# Patient Record
Sex: FEMALE | Race: WHITE | Employment: OTHER | URBAN - METROPOLITAN AREA
[De-identification: names, ages, dates, MRNs, and addresses within clinical notes are randomized per-mention and may not be internally consistent; named-entity substitution may affect disease eponyms.]

---

## 2017-03-21 ENCOUNTER — HOSPITAL ENCOUNTER (OUTPATIENT)
Dept: NUCLEAR MEDICINE | Age: 77
Discharge: HOME | End: 2017-03-21
Attending: PHYSICIAN ASSISTANT
Payer: MEDICARE

## 2017-03-21 ENCOUNTER — HOSPITAL ENCOUNTER (OUTPATIENT)
Dept: ULTRASOUND IMAGING | Age: 77
Discharge: HOME | End: 2017-03-21
Attending: PHYSICIAN ASSISTANT
Payer: MEDICARE

## 2017-03-21 VITALS
WEIGHT: 145 LBS | OXYGEN SATURATION: 98 % | HEART RATE: 89 BPM | DIASTOLIC BLOOD PRESSURE: 76 MMHG | RESPIRATION RATE: 18 BRPM | BODY MASS INDEX: 24.5 KG/M2 | SYSTOLIC BLOOD PRESSURE: 144 MMHG

## 2017-03-21 DIAGNOSIS — R11.0 NAUSEA: ICD-10-CM

## 2017-03-21 DIAGNOSIS — R10.13 EPIGASTRIC ABDOMINAL PAIN: ICD-10-CM

## 2017-03-21 DIAGNOSIS — K21.9 GASTROESOPHAGEAL REFLUX DISEASE, ESOPHAGITIS PRESENCE NOT SPECIFIED: ICD-10-CM

## 2017-03-21 PROCEDURE — 78227 HEPATOBIL SYST IMAGE W/DRUG: CPT

## 2017-03-21 PROCEDURE — 74011250636 HC RX REV CODE- 250/636

## 2017-03-21 PROCEDURE — 76705 ECHO EXAM OF ABDOMEN: CPT

## 2017-03-21 RX ORDER — MORPHINE SULFATE 2 MG/ML
2 INJECTION, SOLUTION INTRAMUSCULAR; INTRAVENOUS ONCE
Status: COMPLETED | OUTPATIENT
Start: 2017-03-21 | End: 2017-03-21

## 2017-03-21 RX ORDER — SODIUM CHLORIDE 0.9 % (FLUSH) 0.9 %
10 SYRINGE (ML) INJECTION
Status: COMPLETED | OUTPATIENT
Start: 2017-03-21 | End: 2017-03-21

## 2017-03-21 RX ADMIN — Medication 10 ML: at 08:34

## 2017-03-21 RX ADMIN — MORPHINE SULFATE 2 MG: 2 INJECTION, SOLUTION INTRAMUSCULAR; INTRAVENOUS at 09:41

## 2017-03-21 NOTE — PROGRESS NOTES
IMPRESSION: Nonvisualization of the gallbladder despite morphine augmentation,  suggestive of acute cholecystitis.   The patient was notified of these results, [facility] was called and an appointment was made with Dr. Vandana Roy tomorrow at 3 PM.

## 2017-03-21 NOTE — PROGRESS NOTES
IMPRESSION: Gallbladder wall thickening, 10 mm and echogenic bile without  discrete stones and borderline common bile duct size of 10 mm. This may  represent acute or chronic cholecystopathy. Represents a change from the prior  exam of October 2016. The patient was notified today and appointment scheduled with Dr. Pablo Pierce for eval and management tomorrow at 3 PM. [facility] to call and notify the patient.

## 2017-03-23 ENCOUNTER — ANESTHESIA EVENT (OUTPATIENT)
Dept: SURGERY | Age: 77
End: 2017-03-23
Payer: MEDICARE

## 2017-03-24 ENCOUNTER — ANESTHESIA (OUTPATIENT)
Dept: SURGERY | Age: 77
End: 2017-03-24
Payer: MEDICARE

## 2017-03-24 ENCOUNTER — HOSPITAL ENCOUNTER (OUTPATIENT)
Age: 77
Setting detail: OUTPATIENT SURGERY
Discharge: HOME | End: 2017-03-24
Attending: SURGERY | Admitting: SURGERY
Payer: MEDICARE

## 2017-03-24 ENCOUNTER — SURGERY (OUTPATIENT)
Age: 77
End: 2017-03-24

## 2017-03-24 VITALS
DIASTOLIC BLOOD PRESSURE: 68 MMHG | RESPIRATION RATE: 18 BRPM | TEMPERATURE: 98.4 F | HEART RATE: 85 BPM | SYSTOLIC BLOOD PRESSURE: 126 MMHG | OXYGEN SATURATION: 98 %

## 2017-03-24 LAB
CREAT SERPL-MCNC: 1.19 MG/DL (ref 0.6–1)
GLUCOSE BLD STRIP.AUTO-MCNC: 115 MG/DL (ref 65–100)
HGB BLD-MCNC: 12.9 G/DL (ref 11.7–15.4)
POTASSIUM SERPL-SCNC: 3.5 MMOL/L (ref 3.5–5.1)

## 2017-03-24 PROCEDURE — 77030020782 HC GWN BAIR PAWS FLX 3M -B: Performed by: ANESTHESIOLOGY

## 2017-03-24 PROCEDURE — 77030010513 HC APPL CLP LIG J&J -C: Performed by: SURGERY

## 2017-03-24 PROCEDURE — 77030036733 HC ENDOLP LIG VCRL SUT J&J -C: Performed by: SURGERY

## 2017-03-24 PROCEDURE — 84132 ASSAY OF SERUM POTASSIUM: CPT | Performed by: ANESTHESIOLOGY

## 2017-03-24 PROCEDURE — 77030009851 HC PCH RTVR ENDOSC AMR -B: Performed by: SURGERY

## 2017-03-24 PROCEDURE — 74011000250 HC RX REV CODE- 250: Performed by: SURGERY

## 2017-03-24 PROCEDURE — 77030008756 HC TU IRR SUC STRY -B: Performed by: SURGERY

## 2017-03-24 PROCEDURE — 77030011640 HC PAD GRND REM COVD -A: Performed by: SURGERY

## 2017-03-24 PROCEDURE — 82565 ASSAY OF CREATININE: CPT | Performed by: ANESTHESIOLOGY

## 2017-03-24 PROCEDURE — 77030035220 HC TRCR ENDOSC BLNTPRT ANCHR COVD -B: Performed by: SURGERY

## 2017-03-24 PROCEDURE — 74011250636 HC RX REV CODE- 250/636

## 2017-03-24 PROCEDURE — 74011250637 HC RX REV CODE- 250/637: Performed by: ANESTHESIOLOGY

## 2017-03-24 PROCEDURE — 77030010507 HC ADH SKN DERMBND J&J -B: Performed by: SURGERY

## 2017-03-24 PROCEDURE — 77030008703 HC TU ET UNCUF COVD -A: Performed by: ANESTHESIOLOGY

## 2017-03-24 PROCEDURE — 74011000250 HC RX REV CODE- 250

## 2017-03-24 PROCEDURE — 77030034154 HC SHR COAG HARM ACE J&J -F: Performed by: SURGERY

## 2017-03-24 PROCEDURE — 77030019940 HC BLNKT HYPOTHRM STRY -B: Performed by: ANESTHESIOLOGY

## 2017-03-24 PROCEDURE — 77030032490 HC SLV COMPR SCD KNE COVD -B: Performed by: SURGERY

## 2017-03-24 PROCEDURE — 77030002933 HC SUT MCRYL J&J -A: Performed by: SURGERY

## 2017-03-24 PROCEDURE — 77030008518 HC TBNG INSUF ENDO STRY -B: Performed by: SURGERY

## 2017-03-24 PROCEDURE — 77030012894: Performed by: SURGERY

## 2017-03-24 PROCEDURE — 77030035051: Performed by: SURGERY

## 2017-03-24 PROCEDURE — 77030008467 HC STPLR SKN COVD -B: Performed by: SURGERY

## 2017-03-24 PROCEDURE — 77030031139 HC SUT VCRL2 J&J -A: Performed by: SURGERY

## 2017-03-24 PROCEDURE — 74011250636 HC RX REV CODE- 250/636: Performed by: SURGERY

## 2017-03-24 PROCEDURE — 76010000161 HC OR TIME 1 TO 1.5 HR INTENSV-TIER 1: Performed by: SURGERY

## 2017-03-24 PROCEDURE — 76210000021 HC REC RM PH II 0.5 TO 1 HR: Performed by: SURGERY

## 2017-03-24 PROCEDURE — 74011250636 HC RX REV CODE- 250/636: Performed by: ANESTHESIOLOGY

## 2017-03-24 PROCEDURE — 76210000006 HC OR PH I REC 0.5 TO 1 HR: Performed by: SURGERY

## 2017-03-24 PROCEDURE — 77030035048 HC TRCR ENDOSC OPTCL COVD -B: Performed by: SURGERY

## 2017-03-24 PROCEDURE — 82962 GLUCOSE BLOOD TEST: CPT

## 2017-03-24 PROCEDURE — 85018 HEMOGLOBIN: CPT | Performed by: ANESTHESIOLOGY

## 2017-03-24 PROCEDURE — 77030009403 HC ELECTRD ENDO MEGA -B: Performed by: SURGERY

## 2017-03-24 PROCEDURE — 76060000033 HC ANESTHESIA 1 TO 1.5 HR: Performed by: SURGERY

## 2017-03-24 PROCEDURE — 88304 TISSUE EXAM BY PATHOLOGIST: CPT | Performed by: SURGERY

## 2017-03-24 PROCEDURE — 77030008477 HC STYL SATN SLP COVD -A: Performed by: ANESTHESIOLOGY

## 2017-03-24 RX ORDER — PROPOFOL 10 MG/ML
INJECTION, EMULSION INTRAVENOUS AS NEEDED
Status: DISCONTINUED | OUTPATIENT
Start: 2017-03-24 | End: 2017-03-24 | Stop reason: HOSPADM

## 2017-03-24 RX ORDER — NEOSTIGMINE METHYLSULFATE 1 MG/ML
INJECTION INTRAVENOUS AS NEEDED
Status: DISCONTINUED | OUTPATIENT
Start: 2017-03-24 | End: 2017-03-24 | Stop reason: HOSPADM

## 2017-03-24 RX ORDER — FENTANYL CITRATE 50 UG/ML
INJECTION, SOLUTION INTRAMUSCULAR; INTRAVENOUS AS NEEDED
Status: DISCONTINUED | OUTPATIENT
Start: 2017-03-24 | End: 2017-03-24 | Stop reason: HOSPADM

## 2017-03-24 RX ORDER — BUPIVACAINE HYDROCHLORIDE 5 MG/ML
INJECTION, SOLUTION EPIDURAL; INTRACAUDAL AS NEEDED
Status: DISCONTINUED | OUTPATIENT
Start: 2017-03-24 | End: 2017-03-24 | Stop reason: HOSPADM

## 2017-03-24 RX ORDER — MIDAZOLAM HYDROCHLORIDE 1 MG/ML
2 INJECTION, SOLUTION INTRAMUSCULAR; INTRAVENOUS
Status: COMPLETED | OUTPATIENT
Start: 2017-03-24 | End: 2017-03-24

## 2017-03-24 RX ORDER — OXYCODONE HYDROCHLORIDE 5 MG/1
10 TABLET ORAL
Status: COMPLETED | OUTPATIENT
Start: 2017-03-24 | End: 2017-03-24

## 2017-03-24 RX ORDER — HYDROMORPHONE HYDROCHLORIDE 2 MG/ML
0.5 INJECTION, SOLUTION INTRAMUSCULAR; INTRAVENOUS; SUBCUTANEOUS
Status: DISCONTINUED | OUTPATIENT
Start: 2017-03-24 | End: 2017-03-24 | Stop reason: HOSPADM

## 2017-03-24 RX ORDER — LIDOCAINE HYDROCHLORIDE 10 MG/ML
0.3 INJECTION INFILTRATION; PERINEURAL ONCE
Status: DISCONTINUED | OUTPATIENT
Start: 2017-03-24 | End: 2017-03-24 | Stop reason: HOSPADM

## 2017-03-24 RX ORDER — ROCURONIUM BROMIDE 10 MG/ML
INJECTION, SOLUTION INTRAVENOUS AS NEEDED
Status: DISCONTINUED | OUTPATIENT
Start: 2017-03-24 | End: 2017-03-24 | Stop reason: HOSPADM

## 2017-03-24 RX ORDER — ONDANSETRON 2 MG/ML
INJECTION INTRAMUSCULAR; INTRAVENOUS AS NEEDED
Status: DISCONTINUED | OUTPATIENT
Start: 2017-03-24 | End: 2017-03-24 | Stop reason: HOSPADM

## 2017-03-24 RX ORDER — SODIUM CHLORIDE 0.9 % (FLUSH) 0.9 %
5-10 SYRINGE (ML) INJECTION AS NEEDED
Status: DISCONTINUED | OUTPATIENT
Start: 2017-03-24 | End: 2017-03-24 | Stop reason: HOSPADM

## 2017-03-24 RX ORDER — SODIUM CHLORIDE, SODIUM LACTATE, POTASSIUM CHLORIDE, CALCIUM CHLORIDE 600; 310; 30; 20 MG/100ML; MG/100ML; MG/100ML; MG/100ML
100 INJECTION, SOLUTION INTRAVENOUS CONTINUOUS
Status: DISCONTINUED | OUTPATIENT
Start: 2017-03-24 | End: 2017-03-24 | Stop reason: HOSPADM

## 2017-03-24 RX ORDER — CEFAZOLIN SODIUM IN 0.9 % NACL 2 G/50 ML
2 INTRAVENOUS SOLUTION, PIGGYBACK (ML) INTRAVENOUS ONCE
Status: COMPLETED | OUTPATIENT
Start: 2017-03-24 | End: 2017-03-24

## 2017-03-24 RX ORDER — SODIUM CHLORIDE 0.9 % (FLUSH) 0.9 %
5-10 SYRINGE (ML) INJECTION EVERY 8 HOURS
Status: DISCONTINUED | OUTPATIENT
Start: 2017-03-24 | End: 2017-03-24 | Stop reason: HOSPADM

## 2017-03-24 RX ORDER — GLYCOPYRROLATE 0.2 MG/ML
INJECTION INTRAMUSCULAR; INTRAVENOUS AS NEEDED
Status: DISCONTINUED | OUTPATIENT
Start: 2017-03-24 | End: 2017-03-24 | Stop reason: HOSPADM

## 2017-03-24 RX ORDER — FAMOTIDINE 20 MG/1
20 TABLET, FILM COATED ORAL ONCE
Status: COMPLETED | OUTPATIENT
Start: 2017-03-24 | End: 2017-03-24

## 2017-03-24 RX ORDER — LIDOCAINE HYDROCHLORIDE 20 MG/ML
INJECTION, SOLUTION EPIDURAL; INFILTRATION; INTRACAUDAL; PERINEURAL AS NEEDED
Status: DISCONTINUED | OUTPATIENT
Start: 2017-03-24 | End: 2017-03-24 | Stop reason: HOSPADM

## 2017-03-24 RX ADMIN — LIDOCAINE HYDROCHLORIDE 100 MG: 20 INJECTION, SOLUTION EPIDURAL; INFILTRATION; INTRACAUDAL; PERINEURAL at 07:22

## 2017-03-24 RX ADMIN — MIDAZOLAM HYDROCHLORIDE 2 MG: 1 INJECTION, SOLUTION INTRAMUSCULAR; INTRAVENOUS at 06:58

## 2017-03-24 RX ADMIN — NEOSTIGMINE METHYLSULFATE 3 MG: 1 INJECTION INTRAVENOUS at 08:01

## 2017-03-24 RX ADMIN — FAMOTIDINE 20 MG: 20 TABLET, FILM COATED ORAL at 06:12

## 2017-03-24 RX ADMIN — BUPIVACAINE HYDROCHLORIDE 30 ML: 5 INJECTION, SOLUTION EPIDURAL; INTRACAUDAL; PERINEURAL at 08:11

## 2017-03-24 RX ADMIN — ONDANSETRON 4 MG: 2 INJECTION INTRAMUSCULAR; INTRAVENOUS at 07:39

## 2017-03-24 RX ADMIN — ROCURONIUM BROMIDE 30 MG: 10 INJECTION, SOLUTION INTRAVENOUS at 07:22

## 2017-03-24 RX ADMIN — CEFAZOLIN 2 G: 1 INJECTION, POWDER, FOR SOLUTION INTRAMUSCULAR; INTRAVENOUS; PARENTERAL at 07:12

## 2017-03-24 RX ADMIN — PROPOFOL 30 MG: 10 INJECTION, EMULSION INTRAVENOUS at 07:40

## 2017-03-24 RX ADMIN — OXYCODONE HYDROCHLORIDE 10 MG: 5 TABLET ORAL at 08:37

## 2017-03-24 RX ADMIN — FENTANYL CITRATE 100 MCG: 50 INJECTION, SOLUTION INTRAMUSCULAR; INTRAVENOUS at 07:22

## 2017-03-24 RX ADMIN — PROPOFOL 150 MG: 10 INJECTION, EMULSION INTRAVENOUS at 07:22

## 2017-03-24 RX ADMIN — GLYCOPYRROLATE 0.4 MG: 0.2 INJECTION INTRAMUSCULAR; INTRAVENOUS at 08:01

## 2017-03-24 RX ADMIN — ROCURONIUM BROMIDE 5 MG: 10 INJECTION, SOLUTION INTRAVENOUS at 07:43

## 2017-03-24 RX ADMIN — SODIUM CHLORIDE, SODIUM LACTATE, POTASSIUM CHLORIDE, AND CALCIUM CHLORIDE 100 ML/HR: 600; 310; 30; 20 INJECTION, SOLUTION INTRAVENOUS at 06:12

## 2017-03-24 NOTE — ANESTHESIA PREPROCEDURE EVALUATION
Anesthetic History   No history of anesthetic complications            Review of Systems / Medical History  Patient summary reviewed and pertinent labs reviewed    Pulmonary  Within defined limits                 Neuro/Psych   Within defined limits           Cardiovascular    Hypertension              Exercise tolerance: >4 METS     GI/Hepatic/Renal               Comments: Last gallbladder attack 2 weeks ago Endo/Other    Diabetes: well controlled, type 2         Other Findings              Physical Exam    Airway  Mallampati: I  TM Distance: 4 - 6 cm  Neck ROM: normal range of motion   Mouth opening: Normal     Cardiovascular    Rhythm: regular  Rate: normal         Dental  No notable dental hx       Pulmonary  Breath sounds clear to auscultation               Abdominal         Other Findings            Anesthetic Plan    ASA: 2  Anesthesia type: general          Induction: Intravenous  Anesthetic plan and risks discussed with: Patient and Spouse

## 2017-03-24 NOTE — IP AVS SNAPSHOT
[address] [address] 
[phone] Patient: Radha Hutchinson MRN: [id number] YDZ:[birth year] You are allergic to the following Allergen Reactions Ciprofloxacin Other (comments) Complains of pain in abd Lipitor (Atorvastatin) Palpitations Ultram (Tramadol) Nausea Only Recent Documentation Height Weight BMI OB Status Smoking Status (P) 1.651 m (P) 66.4 kg (P) 24.35 kg/m2 Postmenopausal Former Smoker Emergency Contacts Name Discharge Info Relation Home Work Mobile [address] CAREGIVER [3] Spouse [3] [phone] About your hospitalization You were admitted on:  March 24, 2017 [facility] You were discharged on:  March 24, 2017 Unit phone number:  [phone] Why you were hospitalized Your primary diagnosis was:  Not on File Providers Seen During Your Hospitalizations Provider Role Specialty Primary office phone Brian Card MD Attending Provider General Surgery [phone] Your Primary Care Physician (PCP) Primary Care Physician Office Phone Office Fax Kanchan Morrissey [phone] Follow-up Information Follow up With Details Comments Contact Info Lorie Lim PA-C   [address] [facility] [place] [id number] 
[phone] Your Appointments Monday April 03, 2017  2:55 PM EDT Global Post Op with Brian Card MD  
[place] [facility] ([address] [place] [address]) [address] [place] [id number] [phone] Current Discharge Medication List  
  
CONTINUE these medications which have NOT CHANGED Dose & Instructions Dispensing Information Comments Morning Noon Evening Bedtime benazepril-hydroCHLOROthiazide 20-12.5 mg per tablet Commonly known as:  LOTENSIN HCT Your last dose was: Your next dose is:    
   
   
 Dose:  1 Tab Take 1 Tab by mouth daily. Quantity:  90 Tab Refills:  1  
     
   
   
   
  
 fenofibrate 160 mg tablet Commonly known as:  LOFIBRA Your last dose was: Your next dose is: TAKE 1 TABLET BY MOUTH DAILY Quantity:  90 Tab Refills:  0  
     
   
   
   
  
 FISH OIL PO Your last dose was: Your next dose is:    
   
   
 Dose:  1 Tab Take 1 Tab by mouth daily. Refills:  0  
     
   
   
   
  
 folic acid 476 mcg tablet Your last dose was: Your next dose is:    
   
   
 Dose:  800 mcg Take 800 mcg by mouth daily. Refills:  0  
     
   
   
   
  
 levothyroxine 75 mcg tablet Commonly known as:  SYNTHROID Your last dose was: Your next dose is:    
   
   
 Dose:  75 mcg Take 1 Tab by mouth Daily (before breakfast). Quantity:  90 Tab Refills:  3  
     
   
   
   
  
 metFORMIN 500 mg tablet Commonly known as:  GLUCOPHAGE Your last dose was: Your next dose is:    
   
   
 Dose:  500 mg Take 500 mg by mouth daily. Refills:  0  
     
   
   
   
  
 raloxifene 60 mg tablet Commonly known as:  EVISTA Your last dose was: Your next dose is:    
   
   
 Dose:  60 mg Take 1 Tab by mouth daily. Quantity:  90 Tab Refills:  3  
     
   
   
   
  
 rosuvastatin 10 mg tablet Commonly known as:  CRESTOR Your last dose was: Your next dose is:    
   
   
 Dose:  10 mg Take 1 Tab by mouth nightly. Quantity:  90 Tab Refills:  3 VITAMIN D3 1,000 unit tablet Generic drug:  cholecalciferol Your last dose was: Your next dose is: Take  by mouth daily. Refills:  0 Discharge Instructions 1. Diet  low fat diet after laparoscopic cholecystectomy. 2. Showering is allowed in 24 hours, but no tub baths, hot tubs or swimming. 3. Drainage is common from the wounds. Change the dressings as needed. Call our office if the wounds become reddened, tender, feel warm to the touch or pus starts to drain from the wound. 4. Take prescribed pain medication as directed, usually Percocet, Norco, Ultram or Dilaudid. Take over the counter medication for minor pain. 5. Ice may be applied intermittently to the surgical site or sites. 6. Call or office, [phone], if problems arise. 7. Follow up in the office at the assigned time. 8. Resume all medications as taken per surgery, unless specifically instructed not to take certain ones. 9. No lifting more than 25 pounds until told otherwise. 10. Driving is allowed 3 days after surgery as long as you feel comfortable enough to drive and have not taken any prescription pain medication prior to driving. After general anesthesia or intravenous sedation, for 24 hours or while taking prescription Narcotics: · Limit your activities · Do not drive and operate hazardous machinery · Do not make important personal or business decisions · Do  not drink alcoholic beverages · If you have not urinated within 8 hours after discharge, please contact your surgeon on call. *  Please give a list of your current medications to your Primary Care Provider. *  Please update this list whenever your medications are discontinued, doses are 
    changed, or new medications (including over-the-counter products) are added. *  Please carry medication information at all times in case of emergency situations. These are general instructions for a healthy lifestyle: No smoking/ No tobacco products/ Avoid exposure to second hand smoke Surgeon General's Warning:  Quitting smoking now greatly reduces serious risk to your health. Obesity, smoking, and sedentary lifestyle greatly increases your risk for illness A healthy diet, regular physical exercise & weight monitoring are important for maintaining a healthy lifestyle You may be retaining fluid if you have a history of heart failure or if you experience any of the following symptoms:  Weight gain of 3 pounds or more overnight or 5 pounds in a week, increased swelling in our hands or feet or shortness of breath while lying flat in bed. Please call your doctor as soon as you notice any of these symptoms; do not wait until your next office visit. Recognize signs and symptoms of STROKE: 
 
F-face looks uneven A-arms unable to move or move unevenly S-speech slurred or non-existent T-time-call 911 as soon as signs and symptoms begin-DO NOT go Back to bed or wait to see if you get better-TIME IS BRAIN. Discharge Orders None ACO Transitions of Care Introducing [employer] [address] Simone offers a voluntary care coordination program to provide high quality service and care to [facility] fee-for-service beneficiaries. David Williamson was designed to help you enhance your health and well-being through the following services: ? Transitions of Care  support for individuals who are transitioning from one care setting to another (example: Hospital to home). ? Chronic and Complex Care Coordination  support for individuals and caregivers of those with serious or chronic illnesses or with more than one chronic (ongoing) condition and those who take a number of different medications. If you meet specific medical criteria, a [facility] [address] may call you directly to coordinate your care with your primary care physician and your other care providers. For questions about the [facility] programs, please, contact your physicians office. For general questions or additional information about Accountable Care Organizations: 
Please visit www.medicare.gov/acos. html or call 1-800-MEDICARE (9-731.526.1781) TTY users should call 6-239.745.8858. Introducing [facility] SERVICES! Adam Carrington introduces [employer] patient portal. Now you can access parts of your medical record, email your doctor's office, and request medication refills online. 1. In your internet browser, go to [email or web address] 2. Click on the First Time User? Click Here link in the Sign In box. You will see the New Member Sign Up page. 3. Enter your [employer] Access Code exactly as it appears below. You will not need to use this code after youve completed the sign-up process. If you do not sign up before the expiration date, you must request a new code. · [employer] Access Code: [email or web address] Expires: 6/4/2017  4:37 PM 
 
4. Enter the last four digits of your Social Security Number (xxxx) and Date of Birth (mm/dd/yyyy) as indicated and click Submit. You will be taken to the next sign-up page. 5. Create a [employer] ID. This will be your [employer] login ID and cannot be changed, so think of one that is secure and easy to remember. 6. Create a [employer] password. You can change your password at any time. 7. Enter your Password Reset Question and Answer. This can be used at a later time if you forget your password. 8. Enter your e-mail address. You will receive e-mail notification when new information is available in [address] 9. Click Sign Up. You can now view and download portions of your medical record. 10. Click the Download Summary menu link to download a portable copy of your medical information. If you have questions, please visit the Frequently Asked Questions section of the [employer] website. Remember, [employer] is NOT to be used for urgent needs. For medical emergencies, dial 911. Now available from your iPhone and Android! General Information Please provide this summary of care documentation to your next provider. Patient Signature:  ____________________________________________________________ Date:  ____________________________________________________________  
  
Marvetta Land Provider Signature:  ____________________________________________________________ Date:  ____________________________________________________________

## 2017-03-24 NOTE — INTERVAL H&P NOTE
H&P Update:  Margarita Palomo was seen and examined. History and physical has been reviewed. The patient has been examined.  There have been no significant clinical changes since the completion of the originally dated History and Physical.    Signed By: Negar Monzon MD     March 24, 2017 6:11 AM

## 2017-03-24 NOTE — OP NOTES
Cholecystectomy Op Note Template Note     Indications: The patient was admitted to the hospital with acute cholecystitis. The patient now presents for laparoscopic, possible open, cholecystectomy after discussing therapeutic alternatives. Pre-Op Diagnosis: ACUTE CHOLECYSTITIS    Post-Op Diagnosis: SAME    Procedure: LAPAROSCOPIC CHOLECYSTECTOMY      Surgeon: Jossue Ontiveros MD    Anesthesia:  General plus local         Procedure Details     The patient was brought to the operating room and placed supine. After induction of a general anesthetic, the abdomen was prepped and draped in standard fashion. An incision was made in the umbilicus and a Simón trocar was placed in the usual fashion after which  the abdomen was insufflated to 15mmHg sterile CO2. The other trocars were then placed under direct vision. These were positioned four fingerbreadths below the right costal margin in the anterior axillary line and mid clavicular line and just to the right of the falciform ligament in the epigatrium. The gallbladder was grasped. Omental adhesions were taken down. The area of Calot's triangle was dissected with the cystic duct and cystic artery being exposed. Once these two structures had been identified the gallbladder graspers were repositioned, so that one was on the liver/gallbladder junction and a second on the fundus of the gallbladder. The gallbladder was  from the liver with the use of the Harmonic scalpel. The dissection was taken down to the cystic artery which was divided between four 5 mm clips. The cystic duct was divided between four 5 mm clips. The gallbladder was free of all attachments and was removed from the abdomen via the umbilical incision site. The gallbladder was sent to pathology for evaluation at this point. The Simón was returned to the umbilical incision site, the insufflation was restarted and the camera placed through the Simón trocar.  The gallbladder fossa was without evidence of bleeding, there was no bleeding from the cystic artery stump and there was no evidence of bile leak from the cystic duct stump. The three upper abdominal trocars were removed under the direct vision without bleeding from the trocar sites. The Simón trocar was removed and the fascia of the umbilicus was closed with 0'0  Vicryl. The trocar sites were closed with the subcuticular Monocryl and Steri-strips. A sterile dressing was applied. The patient was taken to the recovery room in good condition, having tolerated the procedure well. Instrument, sponge, and needle counts were correct prior to abdominal closure and at the conclusion of the case.      Findings: Acute cholecystitis    Estimated Blood Loss:    10 cc's          Specimens: Gallbladder       Signed: Uriel Deng MD

## 2017-03-24 NOTE — ANESTHESIA POSTPROCEDURE EVALUATION
Post-Anesthesia Evaluation and Assessment    Patient: Gatito Gasca MRN: [id number]  SSN: [id number]    YOB: 1940  Age: 68 y.o. Sex: female       Cardiovascular Function/Vital Signs  Visit Vitals    /68    Pulse 85    Temp 36.9 °C (98.4 °F)    Resp 18    Ht (P) 5' 5\" (1.651 m)    Wt (P) 66.4 kg (146 lb 5 oz)    SpO2 98%    BMI (P) 24.35 kg/m2       Patient is status post general anesthesia for Procedure(s):  CHOLECYSTECTOMY LAPAROSCOPIC . Nausea/Vomiting: None    Postoperative hydration reviewed and adequate. Pain:  Pain Scale 1: Numeric (0 - 10) (03/24/17 0907)  Pain Intensity 1: 2 (03/24/17 0907)   Managed    Neurological Status:   Neuro (WDL): Within Defined Limits (03/24/17 0847)   At baseline    Mental Status and Level of Consciousness: Awake alert    Pulmonary Status:   O2 Device: Room air (03/24/17 0847)   Adequate oxygenation and airway patent    Complications related to anesthesia: None    Post-anesthesia assessment completed.  No concerns    Signed By: Stafford Holter, MD     March 24, 2017

## 2017-03-24 NOTE — H&P (VIEW-ONLY)
aDate: 3/23/2017      Name: Ellen Turk      MRN: [id number]       : 1940       Age: 68 y.o. Sex: female        Fernando Sequeira PA-C       CC:    Chief Complaint   Patient presents with    New Patient     gallbladder        HPI:     Ellen Turk is a 68 y.o. female who presents for evaluation of gallbladder problems. The patient had an US done which showed:     FINDINGS: The ultrasonographic Ovalles's sign is reported as negative. There is  echogenic layering debris within the gallbladder although no discrete  gallstones. The gallbladder wall is thickened, up to 10 mm. The common bile duct  is prominent, 10 mm. There are probably some internal echoes within the duct  although no discrete stones. Intrahepatic biliary tree is not dilated. Included  portion of the pancreas and right kidney are unremarkable.     IMPRESSION  IMPRESSION: Gallbladder wall thickening, 10 mm and echogenic bile without  discrete stones and borderline common bile duct size of 10 mm. This may  represent acute or chronic cholecystopathy. Represents a change from the prior  exam of 2016. The patient had a HIDA scan done which showed: FINDINGS: There is good concentration of radiotracer by the liver. There is  prompt excretion in the biliary tree. Common bile duct and proximal small bowel  is demonstrated. At 60 minutes no gallbladder was identified. 2 mg morphine was  given intravenously for augmentation Gallbladder is not identified at 60  minutes. There is reflux of activity in the stomach.      IMPRESSION  IMPRESSION: Nonvisualization of the gallbladder despite morphine augmentation,  suggestive of acute cholecystitis. The patient has had RUQ pain, nausea, pain referred to her back, bloating and GERD. No diarrhea.      PMH:    Past Medical History:   Diagnosis Date    Benign essential hypertension     [facility] ([facility]     DM type 2 (diabetes mellitus, type 2) ([facility]     Gout     Hypercholesterolemia     Hypothyroidism, acquired        PSH:    Past Surgical History:   Procedure Laterality Date    HX BREAST LUMPECTOMY Left        MEDS:    Current Outpatient Prescriptions   Medication Sig    benazepril-hydroCHLOROthiazide (LOTENSIN HCT) 20-12.5 mg per tablet Take 1 Tab by mouth daily.  hydrocortisone (ANUSOL-HC) 25 mg supp Insert 1 Suppository into rectum every twelve (12) hours. As needed for hemorrhoids    hydrocortisone (ANUSOL-HC) 2.5 % rectal cream Insert  into rectum four (4) times daily.  metFORMIN ER (GLUCOPHAGE XR) 500 mg tablet TAKE 1 TABLET BY MOUTH DAILY WITH DINNER    fenofibrate (LOFIBRA) 160 mg tablet TAKE 1 TABLET BY MOUTH DAILY    raloxifene (EVISTA) 60 mg tablet Take 1 Tab by mouth daily.  rosuvastatin (CRESTOR) 10 mg tablet Take 1 Tab by mouth nightly.  levothyroxine (SYNTHROID) 75 mcg tablet Take 1 Tab by mouth Daily (before breakfast).  cholecalciferol (VITAMIN D3) 1,000 unit tablet Take  by mouth daily.  DOCOSAHEXANOIC ACID/EPA (FISH OIL PO) Take  by mouth.  folic acid 225 mcg tablet Take 800 mcg by mouth daily. No current facility-administered medications for this visit. ALLERGIES:      Allergies   Allergen Reactions    Ciprofloxacin Other (comments)     Complains of pain in abd    Lipitor [Atorvastatin] Palpitations    Ultram [Tramadol] Nausea Only       SH:    Social History   Substance Use Topics    Smoking status: Former Smoker     Types: Cigarettes     Quit date: 1/1/1983    Smokeless tobacco: Never Used    Alcohol use No       FH:    Family History   Problem Relation Age of Onset    Cancer Mother      Breast CA    Diabetes Father     Heart Attack Father      M.I       ROS: The patient has no difficulty with chest pain or shortness of breath. No fever or chills. Comprehensive 13 point review of systems was otherwise unremarkable except as noted above.     Physical Exam:     Visit Vitals    /66    Pulse 60    Ht 5' 4\" (1.626 m)    Wt 145 lb (65.8 kg)    BMI 24.89 kg/m2       General: Alert, oriented, cooperative white female in no acute distress. Eyes: Sclera are clear. Conjunctiva and lids within normal limits. No icterus. Ears and Nose: no gross deformities to visual inspection, gross hearing intact  Neck: Supple, trachea midline, no appreciable thyromegaly  Resp: Breathing is  non-labored. Lungs clear to auscultation without wheezing or rhonchi   CV: RRR. No murmurs, rubs or gallops appreciated. Abd: soft, RUQ and epigastric tenderness, active BS'S. Psych:  Mood and affect appropriate. Short-term memory and understanding intact      Assessment/Plan:  Lori Baltazar is a 68 y.o. female who has signs and symptoms consistent with acute cholecystitis. 1. Laparoscopic, possible open, cholecystectomy. I went through the risks of bleeding, infection and anesthesia. I went through other risks of injury to the liver, biliary tree structures, stomach, small bowel, large bowel , pancreas and the potential need to convert to an open procedure.     Morena Bruno MD      [facility]   3/23/2017  2:30 PM

## 2017-04-10 ENCOUNTER — HOSPITAL ENCOUNTER (OUTPATIENT)
Dept: GENERAL RADIOLOGY | Age: 77
Discharge: HOME | End: 2017-04-10
Payer: MEDICARE

## 2017-04-10 DIAGNOSIS — K59.03 DRUG-INDUCED CONSTIPATION: ICD-10-CM

## 2017-04-10 PROCEDURE — 74022 RADEX COMPL AQT ABD SERIES: CPT

## 2017-04-15 ENCOUNTER — APPOINTMENT (OUTPATIENT)
Dept: GENERAL RADIOLOGY | Age: 77
DRG: 871 | End: 2017-04-15
Attending: EMERGENCY MEDICINE
Payer: MEDICARE

## 2017-04-15 ENCOUNTER — APPOINTMENT (OUTPATIENT)
Dept: ULTRASOUND IMAGING | Age: 77
DRG: 871 | End: 2017-04-15
Attending: EMERGENCY MEDICINE
Payer: MEDICARE

## 2017-04-15 ENCOUNTER — HOSPITAL ENCOUNTER (INPATIENT)
Age: 77
LOS: 7 days | Discharge: HOME HEALTH | DRG: 871 | End: 2017-04-22
Attending: EMERGENCY MEDICINE | Admitting: INTERNAL MEDICINE
Payer: MEDICARE

## 2017-04-15 ENCOUNTER — APPOINTMENT (OUTPATIENT)
Dept: GENERAL RADIOLOGY | Age: 77
DRG: 871 | End: 2017-04-15
Attending: NURSE PRACTITIONER
Payer: MEDICARE

## 2017-04-15 ENCOUNTER — APPOINTMENT (OUTPATIENT)
Dept: GENERAL RADIOLOGY | Age: 77
DRG: 871 | End: 2017-04-15
Attending: INTERNAL MEDICINE
Payer: MEDICARE

## 2017-04-15 ENCOUNTER — APPOINTMENT (OUTPATIENT)
Dept: CT IMAGING | Age: 77
DRG: 871 | End: 2017-04-15
Attending: NURSE PRACTITIONER
Payer: MEDICARE

## 2017-04-15 DIAGNOSIS — D64.9 ANEMIA, UNSPECIFIED TYPE: ICD-10-CM

## 2017-04-15 DIAGNOSIS — A41.9 SEVERE SEPSIS WITH SEPTIC SHOCK (HCC): ICD-10-CM

## 2017-04-15 DIAGNOSIS — A41.9 SEPSIS, DUE TO UNSPECIFIED ORGANISM: ICD-10-CM

## 2017-04-15 DIAGNOSIS — I95.9 HYPOTENSION, UNSPECIFIED HYPOTENSION TYPE: ICD-10-CM

## 2017-04-15 DIAGNOSIS — R65.21 SEVERE SEPSIS WITH SEPTIC SHOCK (HCC): ICD-10-CM

## 2017-04-15 DIAGNOSIS — R18.8 OTHER ASCITES: ICD-10-CM

## 2017-04-15 DIAGNOSIS — N17.9 AKI (ACUTE KIDNEY INJURY) (HCC): ICD-10-CM

## 2017-04-15 DIAGNOSIS — R65.10 SIRS (SYSTEMIC INFLAMMATORY RESPONSE SYNDROME) (HCC): Primary | ICD-10-CM

## 2017-04-15 DIAGNOSIS — E46 PROTEIN CALORIE MALNUTRITION (HCC): ICD-10-CM

## 2017-04-15 DIAGNOSIS — D72.829 LEUKOCYTOSIS, UNSPECIFIED TYPE: ICD-10-CM

## 2017-04-15 DIAGNOSIS — Z90.49 S/P LAPAROSCOPIC CHOLECYSTECTOMY: ICD-10-CM

## 2017-04-15 DIAGNOSIS — R60.0 EDEMA OF BOTH LEGS: ICD-10-CM

## 2017-04-15 DIAGNOSIS — N17.9 ACUTE KIDNEY INJURY (HCC): ICD-10-CM

## 2017-04-15 DIAGNOSIS — R14.0 ABDOMINAL DISTENSION: ICD-10-CM

## 2017-04-15 PROBLEM — R10.84 GENERALIZED ABDOMINAL PAIN: Status: ACTIVE | Noted: 2017-04-15

## 2017-04-15 LAB
ALBUMIN SERPL BCP-MCNC: 1.8 G/DL (ref 3.2–4.6)
ALBUMIN/GLOB SERPL: 0.3 {RATIO} (ref 1.2–3.5)
ALP SERPL-CCNC: 208 U/L (ref 50–136)
ALT SERPL-CCNC: 48 U/L (ref 12–65)
ANION GAP BLD CALC-SCNC: 12 MMOL/L (ref 7–16)
APPEARANCE UR: ABNORMAL
AST SERPL W P-5'-P-CCNC: 46 U/L (ref 15–37)
BACTERIA URNS QL MICRO: ABNORMAL /HPF
BILIRUB DIRECT SERPL-MCNC: 1 MG/DL
BILIRUB SERPL-MCNC: 1.8 MG/DL (ref 0.2–1.1)
BILIRUB UR QL: ABNORMAL
BUN SERPL-MCNC: 62 MG/DL (ref 8–23)
CALCIUM SERPL-MCNC: 9.8 MG/DL (ref 8.3–10.4)
CASTS URNS QL MICRO: ABNORMAL /LPF
CHLORIDE SERPL-SCNC: 101 MMOL/L (ref 98–107)
CO2 SERPL-SCNC: 22 MMOL/L (ref 21–32)
COLOR UR: YELLOW
CREAT SERPL-MCNC: 2.4 MG/DL (ref 0.6–1)
CRYSTALS URNS QL MICRO: ABNORMAL /LPF
DIFFERENTIAL METHOD BLD: ABNORMAL
EPI CELLS #/AREA URNS HPF: ABNORMAL /HPF
ERYTHROCYTE [DISTWIDTH] IN BLOOD BY AUTOMATED COUNT: 13.1 % (ref 11.9–14.6)
GLOBULIN SER CALC-MCNC: 5.7 G/DL (ref 2.3–3.5)
GLUCOSE SERPL-MCNC: 190 MG/DL (ref 65–100)
GLUCOSE UR STRIP.AUTO-MCNC: NEGATIVE MG/DL
HCT VFR BLD AUTO: 32.2 % (ref 35.8–46.3)
HGB BLD-MCNC: 10.8 G/DL (ref 11.7–15.4)
HGB UR QL STRIP: NEGATIVE
INR PPP: 1.2 (ref 0.9–1.2)
KETONES UR QL STRIP.AUTO: ABNORMAL MG/DL
LACTATE BLD-SCNC: 1.9 MMOL/L (ref 0.5–1.9)
LEUKOCYTE ESTERASE UR QL STRIP.AUTO: ABNORMAL
LIPASE SERPL-CCNC: 284 U/L (ref 73–393)
LYMPHOCYTES # BLD: 1.6 K/UL (ref 0.5–4.6)
LYMPHOCYTES NFR BLD MANUAL: 7 % (ref 16–44)
MCH RBC QN AUTO: 27.4 PG (ref 26.1–32.9)
MCHC RBC AUTO-ENTMCNC: 33.5 G/DL (ref 31.4–35)
MCV RBC AUTO: 81.7 FL (ref 79.6–97.8)
MONOCYTES # BLD: 1.1 K/UL (ref 0.1–1.3)
MONOCYTES NFR BLD MANUAL: 5 % (ref 3–9)
NEUTS BAND NFR BLD MANUAL: 5 % (ref 0–10)
NEUTS SEG # BLD: 19.9 K/UL (ref 1.7–8.2)
NEUTS SEG NFR BLD MANUAL: 83 % (ref 47–75)
NITRITE UR QL STRIP.AUTO: NEGATIVE
PH UR STRIP: 5 [PH] (ref 5–9)
PLATELET # BLD AUTO: 896 K/UL (ref 150–450)
PLATELET COMMENTS,PCOM: ABNORMAL
PMV BLD AUTO: 9.6 FL (ref 10.8–14.1)
POTASSIUM SERPL-SCNC: 4.7 MMOL/L (ref 3.5–5.1)
PROCALCITONIN SERPL-MCNC: 0.4 NG/ML
PROT SERPL-MCNC: 7.5 G/DL (ref 6.3–8.2)
PROT UR STRIP-MCNC: 30 MG/DL
PROTHROMBIN TIME: 12.6 SEC (ref 9.6–12)
RBC # BLD AUTO: 3.94 M/UL (ref 4.05–5.25)
RBC #/AREA URNS HPF: ABNORMAL /HPF
RBC MORPH BLD: ABNORMAL
SODIUM SERPL-SCNC: 135 MMOL/L (ref 136–145)
SP GR UR REFRACTOMETRY: 1.03 (ref 1–1.02)
UROBILINOGEN UR QL STRIP.AUTO: 0.2 EU/DL (ref 0.2–1)
WBC # BLD AUTO: 22.6 K/UL (ref 4.3–11.1)
WBC URNS QL MICRO: ABNORMAL /HPF

## 2017-04-15 PROCEDURE — 77030010547 HC BG URIN W/UMETER -A

## 2017-04-15 PROCEDURE — 87086 URINE CULTURE/COLONY COUNT: CPT | Performed by: NURSE PRACTITIONER

## 2017-04-15 PROCEDURE — 80053 COMPREHEN METABOLIC PANEL: CPT | Performed by: EMERGENCY MEDICINE

## 2017-04-15 PROCEDURE — 96375 TX/PRO/DX INJ NEW DRUG ADDON: CPT | Performed by: EMERGENCY MEDICINE

## 2017-04-15 PROCEDURE — C1751 CATH, INF, PER/CENT/MIDLINE: HCPCS

## 2017-04-15 PROCEDURE — 99285 EMERGENCY DEPT VISIT HI MDM: CPT | Performed by: EMERGENCY MEDICINE

## 2017-04-15 PROCEDURE — 65620000000 HC RM CCU GENERAL

## 2017-04-15 PROCEDURE — 74022 RADEX COMPL AQT ABD SERIES: CPT

## 2017-04-15 PROCEDURE — 74011000258 HC RX REV CODE- 258: Performed by: EMERGENCY MEDICINE

## 2017-04-15 PROCEDURE — 77030019605

## 2017-04-15 PROCEDURE — 74011250636 HC RX REV CODE- 250/636: Performed by: INTERNAL MEDICINE

## 2017-04-15 PROCEDURE — 83690 ASSAY OF LIPASE: CPT | Performed by: NURSE PRACTITIONER

## 2017-04-15 PROCEDURE — 85610 PROTHROMBIN TIME: CPT | Performed by: EMERGENCY MEDICINE

## 2017-04-15 PROCEDURE — 74011250636 HC RX REV CODE- 250/636: Performed by: EMERGENCY MEDICINE

## 2017-04-15 PROCEDURE — 96361 HYDRATE IV INFUSION ADD-ON: CPT | Performed by: EMERGENCY MEDICINE

## 2017-04-15 PROCEDURE — 99284 EMERGENCY DEPT VISIT MOD MDM: CPT | Performed by: EMERGENCY MEDICINE

## 2017-04-15 PROCEDURE — 96376 TX/PRO/DX INJ SAME DRUG ADON: CPT | Performed by: EMERGENCY MEDICINE

## 2017-04-15 PROCEDURE — 77030034850

## 2017-04-15 PROCEDURE — 81001 URINALYSIS AUTO W/SCOPE: CPT | Performed by: EMERGENCY MEDICINE

## 2017-04-15 PROCEDURE — 93005 ELECTROCARDIOGRAM TRACING: CPT | Performed by: EMERGENCY MEDICINE

## 2017-04-15 PROCEDURE — 02HV33Z INSERTION OF INFUSION DEVICE INTO SUPERIOR VENA CAVA, PERCUTANEOUS APPROACH: ICD-10-PCS | Performed by: INTERNAL MEDICINE

## 2017-04-15 PROCEDURE — 99223 1ST HOSP IP/OBS HIGH 75: CPT | Performed by: INTERNAL MEDICINE

## 2017-04-15 PROCEDURE — 71020 XR CHEST PA LAT: CPT

## 2017-04-15 PROCEDURE — 85025 COMPLETE CBC W/AUTO DIFF WBC: CPT | Performed by: EMERGENCY MEDICINE

## 2017-04-15 PROCEDURE — 87186 SC STD MICRODIL/AGAR DIL: CPT | Performed by: NURSE PRACTITIONER

## 2017-04-15 PROCEDURE — 93005 ELECTROCARDIOGRAM TRACING: CPT | Performed by: INTERNAL MEDICINE

## 2017-04-15 PROCEDURE — 87088 URINE BACTERIA CULTURE: CPT | Performed by: NURSE PRACTITIONER

## 2017-04-15 PROCEDURE — 83605 ASSAY OF LACTIC ACID: CPT

## 2017-04-15 PROCEDURE — 84145 PROCALCITONIN (PCT): CPT | Performed by: EMERGENCY MEDICINE

## 2017-04-15 PROCEDURE — 82248 BILIRUBIN DIRECT: CPT | Performed by: NURSE PRACTITIONER

## 2017-04-15 PROCEDURE — 96365 THER/PROPH/DIAG IV INF INIT: CPT | Performed by: EMERGENCY MEDICINE

## 2017-04-15 PROCEDURE — 74176 CT ABD & PELVIS W/O CONTRAST: CPT

## 2017-04-15 PROCEDURE — 36556 INSERT NON-TUNNEL CV CATH: CPT

## 2017-04-15 PROCEDURE — 36556 INSERT NON-TUNNEL CV CATH: CPT | Performed by: INTERNAL MEDICINE

## 2017-04-15 PROCEDURE — 76705 ECHO EXAM OF ABDOMEN: CPT

## 2017-04-15 PROCEDURE — 74011636320 HC RX REV CODE- 636/320: Performed by: SURGERY

## 2017-04-15 PROCEDURE — 71010 XR CHEST PORT: CPT

## 2017-04-15 PROCEDURE — B548ZZA ULTRASONOGRAPHY OF SUPERIOR VENA CAVA, GUIDANCE: ICD-10-PCS | Performed by: INTERNAL MEDICINE

## 2017-04-15 RX ORDER — MORPHINE SULFATE 2 MG/ML
2 INJECTION, SOLUTION INTRAMUSCULAR; INTRAVENOUS
Status: COMPLETED | OUTPATIENT
Start: 2017-04-15 | End: 2017-04-15

## 2017-04-15 RX ORDER — SODIUM CHLORIDE 9 MG/ML
1000 INJECTION, SOLUTION INTRAVENOUS ONCE
Status: COMPLETED | OUTPATIENT
Start: 2017-04-15 | End: 2017-04-15

## 2017-04-15 RX ORDER — MORPHINE SULFATE 4 MG/ML
4 INJECTION, SOLUTION INTRAMUSCULAR; INTRAVENOUS
Status: COMPLETED | OUTPATIENT
Start: 2017-04-15 | End: 2017-04-15

## 2017-04-15 RX ORDER — VANCOMYCIN HYDROCHLORIDE
1250 ONCE
Status: COMPLETED | OUTPATIENT
Start: 2017-04-15 | End: 2017-04-16

## 2017-04-15 RX ORDER — ONDANSETRON 2 MG/ML
4 INJECTION INTRAMUSCULAR; INTRAVENOUS
Status: DISCONTINUED | OUTPATIENT
Start: 2017-04-15 | End: 2017-04-22 | Stop reason: HOSPADM

## 2017-04-15 RX ORDER — SODIUM CHLORIDE 9 MG/ML
1000 INJECTION, SOLUTION INTRAVENOUS ONCE
Status: DISCONTINUED | OUTPATIENT
Start: 2017-04-15 | End: 2017-04-16

## 2017-04-15 RX ORDER — FAMOTIDINE 10 MG/ML
20 INJECTION INTRAVENOUS EVERY 12 HOURS
Status: DISCONTINUED | OUTPATIENT
Start: 2017-04-15 | End: 2017-04-15

## 2017-04-15 RX ORDER — ONDANSETRON 2 MG/ML
4 INJECTION INTRAMUSCULAR; INTRAVENOUS
Status: COMPLETED | OUTPATIENT
Start: 2017-04-15 | End: 2017-04-15

## 2017-04-15 RX ORDER — SODIUM CHLORIDE 9 MG/ML
500 INJECTION, SOLUTION INTRAVENOUS ONCE
Status: COMPLETED | OUTPATIENT
Start: 2017-04-15 | End: 2017-04-15

## 2017-04-15 RX ORDER — FAMOTIDINE 20 MG/1
20 TABLET, FILM COATED ORAL 2 TIMES DAILY
Status: DISCONTINUED | OUTPATIENT
Start: 2017-04-16 | End: 2017-04-15 | Stop reason: ALTCHOICE

## 2017-04-15 RX ORDER — ALBUMIN HUMAN 250 G/1000ML
12.5 SOLUTION INTRAVENOUS EVERY 8 HOURS
Status: COMPLETED | OUTPATIENT
Start: 2017-04-16 | End: 2017-04-18

## 2017-04-15 RX ORDER — SODIUM CHLORIDE, SODIUM LACTATE, POTASSIUM CHLORIDE, CALCIUM CHLORIDE 600; 310; 30; 20 MG/100ML; MG/100ML; MG/100ML; MG/100ML
75 INJECTION, SOLUTION INTRAVENOUS CONTINUOUS
Status: DISCONTINUED | OUTPATIENT
Start: 2017-04-15 | End: 2017-04-19

## 2017-04-15 RX ORDER — SODIUM CHLORIDE 0.9 % (FLUSH) 0.9 %
5-10 SYRINGE (ML) INJECTION AS NEEDED
Status: DISCONTINUED | OUTPATIENT
Start: 2017-04-15 | End: 2017-04-22 | Stop reason: HOSPADM

## 2017-04-15 RX ORDER — OXYCODONE AND ACETAMINOPHEN 5; 325 MG/1; MG/1
1 TABLET ORAL
Status: DISCONTINUED | OUTPATIENT
Start: 2017-04-15 | End: 2017-04-21 | Stop reason: SDUPTHER

## 2017-04-15 RX ORDER — LEVOTHYROXINE SODIUM 50 UG/1
75 TABLET ORAL
Status: DISCONTINUED | OUTPATIENT
Start: 2017-04-16 | End: 2017-04-15

## 2017-04-15 RX ORDER — FAMOTIDINE 10 MG/ML
20 INJECTION INTRAVENOUS DAILY
Status: DISCONTINUED | OUTPATIENT
Start: 2017-04-17 | End: 2017-04-19

## 2017-04-15 RX ADMIN — SODIUM CHLORIDE, SODIUM LACTATE, POTASSIUM CHLORIDE, AND CALCIUM CHLORIDE 125 ML/HR: 600; 310; 30; 20 INJECTION, SOLUTION INTRAVENOUS at 23:10

## 2017-04-15 RX ADMIN — SODIUM CHLORIDE 1000 ML: 900 INJECTION, SOLUTION INTRAVENOUS at 15:35

## 2017-04-15 RX ADMIN — Medication 2 MG: at 20:17

## 2017-04-15 RX ADMIN — ONDANSETRON 4 MG: 2 INJECTION INTRAMUSCULAR; INTRAVENOUS at 16:43

## 2017-04-15 RX ADMIN — DIATRIZOATE MEGLUMINE AND DIATRIZOATE SODIUM 15 ML: 600; 100 SOLUTION ORAL; RECTAL at 18:35

## 2017-04-15 RX ADMIN — SODIUM CHLORIDE 500 ML: 900 INJECTION, SOLUTION INTRAVENOUS at 20:17

## 2017-04-15 RX ADMIN — MORPHINE SULFATE 4 MG: 4 INJECTION, SOLUTION INTRAMUSCULAR; INTRAVENOUS at 16:43

## 2017-04-15 RX ADMIN — PIPERACILLIN SODIUM,TAZOBACTAM SODIUM 4.5 G: 4; .5 INJECTION, POWDER, FOR SOLUTION INTRAVENOUS at 20:33

## 2017-04-15 NOTE — ED TRIAGE NOTES
Pt. States she had gallbladder surgery 3 weeks ago and states she has been weak and not had an appetite since. Pt. States she had an episode of shortness of breath \"the other day. \"

## 2017-04-15 NOTE — IP AVS SNAPSHOT
[address] 
[phone] Patient: Keesha Sanchez MRN: [id number] YUK:[birth year] You are allergic to the following Allergen Reactions Ciprofloxacin Other (comments) Complains of pain in abd Lipitor (Atorvastatin) Palpitations Ultram (Tramadol) Nausea Only Recent Documentation Height Weight Breastfeeding? BMI OB Status Smoking Status 1.651 m 75 kg No 27.52 kg/m2 Postmenopausal Former Smoker Emergency Contacts Name Discharge Info Relation Home Work Mobile [address] CAREGIVER [3] Spouse [3] [phone] About your hospitalization You were admitted on:  April 15, 2017 You last received care in the:  [facility] 2 SURGICAL You were discharged on:  April 22, 2017 Unit phone number:  [phone] Why you were hospitalized Your primary diagnosis was:  Severe Sepsis With Septic Shock (Hcc) Your diagnoses also included:  Ascites, Abdominal Distension, Hypotension, S/P Laparoscopic Cholecystectomy, Erickson (Acute Kidney Injury) (Hcc), Leukocytosis, Protein Calorie Malnutrition (Hcc), Anemia, Edema Of Both Legs Providers Seen During Your Hospitalizations Provider Role Specialty Primary office phone Karissa Vega DO Attending Provider Emergency Medicine [phone] Dariusz Song DO Attending Provider Internal Medicine [phone] Chema Miranda MD Attending Provider General Surgery [phone] Your Primary Care Physician (PCP) Primary Care Physician Office Phone Office Fax Belkis Bernal [phone] Follow-up Information Follow up With Details Comments Contact Info Sukhwinder Guerrero On 5/7/2017 at [address] [facility] [address] [phone] Chema Miranda MD Go to  [place] [address] [address] [place] [address] 
[phone] Jenny Fernandez PA-C   [address] [facility] [address] 
[phone] Call Dr. Mann Guadarrama office on monday at [phone] to schedule follow up appointment for one week Your Appointments Monday May 01, 2017 12:00 PM EDT  
IR GUIDE CATH/PERC DRAIN  PERITON/RETROPERI FLUID W SI with [facility] UNIT 1, [facility] RADIOLOGIST RESOURCE, [facility] ANES NOT REQUIRED  
[facility] ([address] [place] [address]) [address] [facility]  
[phone] Interventional Radiology Procedure Referring Physicians: 1) Fax H&P/recent office notes and lab work, no older than 30 days (CBC, BMP, PT/PTT) to Interventional Radiology to facilitate prompt scheduling. 2)Patients with contrast dye allergies must be pre medicated prior to arrival. 3) Obtain clearance to hold blood thinners from prescribing Physician and give Patient instructions prior to arrival. 4)Hold oral diabetic medications the day of the procedure. If Insulin is required, take 1/2 dose the day of the procedure. 5) Pt should not eat or drink anything past midnight 6) Pt to arrive 1 hour to 1.5 hours early depending upon sedation method. 7) Responsible adult  required to drive Patient home after recovery period. Recovery period can vary depending on sedation and patient condition. 8) Requires approval from Radiologist prior to scheduling 9) Interventional Radiology Scheduling can be contacted at [phone] Current Discharge Medication List  
  
START taking these medications Dose & Instructions Dispensing Information Comments Morning Noon Evening Bedtime  
 amoxicillin-clavulanate 875-125 mg per tablet Commonly known as:  AUGMENTIN Your last dose was: Your next dose is:    
   
   
 Dose:  1 Tab Take 1 Tab by mouth two (2) times a day for 7 days. Quantity:  14 Tab Refills:  0 oxyCODONE-acetaminophen 5-325 mg per tablet Commonly known as:  PERCOCET Your last dose was: Your next dose is:    
   
   
 1-2 tabs by mouth every four hours prn pain Quantity:  30 Tab Refills:  0  
     
   
   
   
  
 zolpidem 5 mg tablet Commonly known as:  AMBIEN Your last dose was: Your next dose is:    
   
   
 Dose:  5 mg Take 1 Tab by mouth nightly as needed for Sleep. Max Daily Amount: 5 mg. Quantity:  30 Tab Refills:  1 CONTINUE these medications which have NOT CHANGED Dose & Instructions Dispensing Information Comments Morning Noon Evening Bedtime  
 benazepril-hydroCHLOROthiazide 20-12.5 mg per tablet Commonly known as:  LOTENSIN HCT Your last dose was: Your next dose is:    
   
   
 Dose:  1 Tab Take 1 Tab by mouth daily. Quantity:  90 Tab Refills:  1  
     
   
   
   
  
 fenofibrate 160 mg tablet Commonly known as:  LOFIBRA Your last dose was: Your next dose is: TAKE 1 TABLET BY MOUTH DAILY Quantity:  90 Tab Refills:  0  
     
   
   
   
  
 FISH OIL PO Your last dose was: Your next dose is:    
   
   
 Dose:  1 Tab Take 1 Tab by mouth daily. Refills:  0  
     
   
   
   
  
 folic acid 112 mcg tablet Your last dose was: Your next dose is:    
   
   
 Dose:  800 mcg Take 800 mcg by mouth daily. Refills:  0  
     
   
   
   
  
 levothyroxine 75 mcg tablet Commonly known as:  SYNTHROID Your last dose was: Your next dose is:    
   
   
 Dose:  75 mcg Take 1 Tab by mouth Daily (before breakfast). Quantity:  90 Tab Refills:  3  
     
   
   
   
  
 metFORMIN 500 mg tablet Commonly known as:  GLUCOPHAGE Your last dose was: Your next dose is:    
   
   
 Dose:  500 mg Take 500 mg by mouth daily. Refills:  0  
     
   
   
   
  
 raloxifene 60 mg tablet Commonly known as:  EVISTA Your last dose was: Your next dose is:    
   
   
 Dose:  60 mg Take 1 Tab by mouth daily. Quantity:  90 Tab Refills:  3  
     
   
   
   
  
 raNITIdine 150 mg tablet Commonly known as:  ZANTAC Your last dose was: Your next dose is:    
   
   
 Dose:  150 mg Take 1 Tab by mouth two (2) times a day. Quantity:  60 Tab Refills:  0  
     
   
   
   
  
 rosuvastatin 10 mg tablet Commonly known as:  CRESTOR Your last dose was: Your next dose is:    
   
   
 Dose:  10 mg Take 1 Tab by mouth nightly. Quantity:  90 Tab Refills:  3 VITAMIN D3 1,000 unit tablet Generic drug:  cholecalciferol Your last dose was: Your next dose is: Take  by mouth daily. Refills:  0 Where to Get Your Medications Information on where to get these meds will be given to you by the nurse or doctor. ! Ask your nurse or doctor about these medications  
  amoxicillin-clavulanate 875-125 mg per tablet  
 oxyCODONE-acetaminophen 5-325 mg per tablet  
 zolpidem 5 mg tablet Discharge Instructions Infection After Surgery: Care Instructions Your Care Instructions After surgery, an infection is always possible. It doesn't mean that the surgery didn't go well. Because an infection can be serious, your doctor has taken steps to manage it. Your doctor checked the infection and cleaned it if necessary. He or she may have made an opening in the area so that the pus can drain out. You may have gauze in the cut so that the area will stay open and keep draining. You may need antibiotics. You will need to follow up with your doctor to make sure the infection has gone away. Follow-up care is a key part of your treatment and safety.  Be sure to make and go to all appointments, and call your doctor if you are having problems. It's also a good idea to know your test results and keep a list of the medicines you take. How can you care for yourself at home? · Make sure your surgeon knows that you saw a doctor about the infection. · If your doctor prescribed antibiotics, take them as directed. Do not stop taking them just because you feel better. You need to take the full course of antibiotics. · Ask your doctor if you can take an over-the-counter pain medicine, such as acetaminophen (Tylenol), ibuprofen (Advil, Motrin), or naproxen (Aleve). Be safe with medicines. Read and follow all instructions on the label. · Do not take two or more pain medicines at the same time unless the doctor told you to. Many pain medicines have acetaminophen, which is Tylenol. Too much acetaminophen (Tylenol) can be harmful. · Prop up the area on a pillow anytime you sit or lie down during the next 3 days. Try to keep it above the level of your heart. This will help reduce swelling. · Keep the skin clean and dry. · If you have a bandage, keep it clean and dry. · You may have a dressing over the cut (incision). A dressing helps the incision heal and protects it. Your doctor will tell you how to take care of this. You can expect drainage from the wound. · If your doctor told you how to care for your incision, follow your doctor's instructions. If you did not get instructions, follow this general advice: ¨ Wash around the incision with clean water 2 times a day. Don't use hydrogen peroxide or alcohol, which can slow healing. When should you call for help? Call your doctor now or seek immediate medical care if: 
· You have signs that your infection is getting worse, such as: 
¨ Increased pain, swelling, warmth, or redness in the area. ¨ Red streaks leading from the area. ¨ Pus draining from the wound. ¨ A new or higher fever. Watch closely for changes in your health, and be sure to contact your doctor if you have any problems. Where can you learn more? Go to http://renzo-merced.info/. Enter C340 in the search box to learn more about \"Infection After Surgery: Care Instructions. \" Current as of: May 27, 2016 Content Version: 11.2 © 6547-0388 [place] . Care instructions adapted under license by .com (which disclaims liability or warranty for this information). If you have questions about a medical condition or this instruction, always ask your healthcare professional. [address] any warranty or liability for your use of this information. Sepsis: Care Instructions Your Care Instructions Sepsis is an infection that has spread throughout your body. It is a life-threatening condition and often causes extremely low blood pressure. This can lead to problems with many different organs. The cause of sepsis is not always clear, but it can happen as part of a long-term or sudden illness. Sometimes even a mild illness can lead to sepsis. Follow-up care is a key part of your treatment and safety. Be sure to make and go to all appointments, and call your doctor if you are having problems. Its also a good idea to know your test results and keep a list of the medicines you take. How can you care for yourself at home? · If your doctor prescribed antibiotics, take them as directed. Do not stop taking them just because you feel better. You need to take the full course of antibiotics. · Drink plenty of fluids, enough so that your urine is light yellow or clear like water. Choose water or caffeine-free clear liquids until you feel better. If you have kidney, heart, or liver disease and have to limit fluids, talk with your doctor before you increase your fluid intake. You can try rehydration drinks, such as Gatorade or Powerade. · Do not drink alcohol. · Eat a healthy diet. Include fruits, vegetables, and whole grains in your diet every day. · Walking is an easy way to get exercise. Gradually increase the amount you walk every day. Make sure your doctor knows that you are starting an exercise program. 
· Do not smoke or use other tobacco products. If you need help quitting, talk to your doctor about stop-smoking programs and medicines. These can increase your chances of quitting for good. When should you call for help? Call 911 anytime you think you may need emergency care. For example, call if: 
· You passed out (lost consciousness). Call your doctor now or seek immediate medical care if: 
· You have a fever or chills. · You have cool, pale, or clammy skin. · You are dizzy or lightheaded, or you feel like you may faint. · You have any new symptoms, such as a cough, pain in one part of your body, or urinary problems. Watch closely for changes in your health, and be sure to contact your doctor if: 
· You do not get better as expected. Where can you learn more? Go to http://renzo-merced.info/. Enter V450 in the search box to learn more about \"Sepsis: Care Instructions. \" Current as of: May 27, 2016 Content Version: 11.2 © 3716-1228 [employer], [employer]. Care instructions adapted under license by [employer] (which disclaims liability or warranty for this information). If you have questions about a medical condition or this instruction, always ask your healthcare professional. [place] [address] any warranty or liability for your use of this information. Discharge Orders None ACO Transitions of Care Introducing [employer] [address] Missy Nichols offers a voluntary care coordination program to provide high quality service and care to [facility] fee-for-service beneficiaries. Miguelito Knee was designed to help you enhance your health and well-being through the following services: ? Transitions of Care  support for individuals who are transitioning from one care setting to another (example: Hospital to home). ? Chronic and Complex Care Coordination  support for individuals and caregivers of those with serious or chronic illnesses or with more than one chronic (ongoing) condition and those who take a number of different medications. If you meet specific medical criteria, a [facility] [address] may call you directly to coordinate your care with your primary care physician and your other care providers. For questions about the [facility] programs, please, contact your physicians office. For general questions or additional information about Accountable Care Organizations: 
Please visit www.medicare.gov/acos. html or call 1-800-MEDICARE (6-348.999.7369) TTY users should call 8-809.297.4154. [facility] Announcement We are excited to announce that we are making your provider's discharge notes available to you in [facility]. You will see these notes when they are completed and signed by the physician that discharged you from your recent hospital stay. If you have any questions or concerns about any information you see in [facility], please call the Health Information Department where you were seen or reach out to your Primary Care Provider for more information about your plan of care. Introducing hospitals & HEALTH SERVICES! Chandra Beck introduces [facility] patient portal. Now you can access parts of your medical record, email your doctor's office, and request medication refills online. 1. In your internet browser, go to [email or web address] 2. Click on the First Time User? Click Here link in the Sign In box. You will see the New Member Sign Up page. 3. Enter your [facility] Access Code exactly as it appears below. You will not need to use this code after youve completed the sign-up process.  If you do not sign up before the expiration date, you must request a new code. · Wink[employer] Access Code: [email or web address] Expires: 6/4/2017  4:37 PM 
 
4. Enter the last four digits of your Social Security Number (xxxx) and Date of Birth (mm/dd/yyyy) as indicated and click Submit. You will be taken to the next sign-up page. 5. Create a Winkcam ID. This will be your Winkcam login ID and cannot be changed, so think of one that is secure and easy to remember. 6. Create a Winkcam password. You can change your password at any time. 7. Enter your Password Reset Question and Answer. This can be used at a later time if you forget your password. 8. Enter your e-mail address. You will receive e-mail notification when new information is available in [address] 9. Click Sign Up. You can now view and download portions of your medical record. 10. Click the Download Summary menu link to download a portable copy of your medical information. If you have questions, please visit the Frequently Asked Questions section of the Winkcam website. Remember, Winkcam is NOT to be used for urgent needs. For medical emergencies, dial 911. Now available from your iPhone and Android! General Information Please provide this summary of care documentation to your next provider. Patient Signature:  ____________________________________________________________ Date:  ____________________________________________________________  
  
Cecelia Harrison Provider Signature:  ____________________________________________________________ Date:  ____________________________________________________________

## 2017-04-15 NOTE — ED PROVIDER NOTES
Patient is a 68 y.o. female presenting with fatigue. The history is provided by the patient. Fatigue   This is a new problem. The current episode started more than 1 week ago. The problem has been gradually worsening. There was no focality noted. Pertinent negatives include no loss of balance, no speech difficulty and no mental status change. There has been no fever. The fever has been present for less than 1 day. Associated symptoms include shortness of breath and nausea. Pertinent negatives include no chest pain, no vomiting, no altered mental status, no confusion and no headaches. Associated medical issues comments: cholecystectomy 3wks ago. Past Medical History:   Diagnosis Date    Benign essential hypertension     managed well with meds    Breast cancer ([facility]) 20 years ago    Left breast- treated with Chemo and radiation    DM type 2 (diabetes mellitus, type 2) (HCC)     oral agent only/AVG BS: 106/no s.s of of hypoglycemia/Last A1c 6.5    Former cigarette smoker     Gout     hx of - no recent episodes    Hypercholesterolemia     controlled well with meds    Hypothyroidism, acquired     managed well with Synthroid       Past Surgical History:   Procedure Laterality Date    HX BREAST LUMPECTOMY Left     HX CHOLECYSTECTOMY  2017    HX COLONOSCOPY      HX WISDOM TEETH EXTRACTION           Family History:   Problem Relation Age of Onset    Cancer Mother      Breast CA    Heart Disease Mother     Diabetes Father     Heart Attack Father      M.I       Social History     Social History    Marital status:      Spouse name: N/A    Number of children: N/A    Years of education: N/A     Occupational History    Not on file.      Social History Main Topics    Smoking status: Former Smoker     Packs/day: 1.00     Years: 20.00     Types: Cigarettes     Quit date: 1/1/1983    Smokeless tobacco: Never Used    Alcohol use 0.0 oz/week     0 Standard drinks or equivalent per week      Comment: socially    Drug use: No    Sexual activity: Not on file     Other Topics Concern    Not on file     Social History Narrative    Lives at home with , [employer]: Ciprofloxacin; Lipitor [atorvastatin]; and Ultram [tramadol]    Review of Systems   Constitutional: Positive for fatigue. Negative for chills and fever. HENT: Negative. Negative for congestion, ear pain, postnasal drip and rhinorrhea. Eyes: Negative for pain and visual disturbance. Respiratory: Positive for shortness of breath. Negative for cough and wheezing. Cardiovascular: Negative for chest pain and leg swelling. Gastrointestinal: Positive for nausea. Negative for abdominal distention, abdominal pain and vomiting. Endocrine: Negative. Negative for polydipsia, polyphagia and polyuria. Genitourinary: Negative. Negative for difficulty urinating, flank pain and frequency. Musculoskeletal: Negative. Negative for arthralgias and myalgias. Skin: Negative. Neurological: Negative. Negative for dizziness, speech difficulty, headaches and loss of balance. Hematological: Negative. Psychiatric/Behavioral: Negative for confusion. Vitals:    04/15/17 1319   BP: (!) 141/111   Pulse: (!) 114   Resp: 16   Temp: 97.5 °F (36.4 °C)   SpO2: 98%   Weight: 64.9 kg (143 lb)   Height: 5' 5\" (1.651 m)            Physical Exam   Constitutional: She is oriented to person, place, and time. She appears well-developed and well-nourished. Non-toxic appearance. She does not have a sickly appearance. She does not appear ill. HENT:   Head: Normocephalic and atraumatic. Cardiovascular: Intact distal pulses. Pulmonary/Chest: Effort normal.   Abdominal: Soft. There is generalized tenderness. There is no rigidity, no guarding, no tenderness at McBurney's point and negative Ovalles's sign. Neurological: She is alert and oriented to person, place, and time. Skin: Skin is warm and dry.    Psychiatric: She has a normal mood and affect. Her behavior is normal.   Nursing note and vitals reviewed. MDM  Number of Diagnoses or Management Options  Acute kidney injury [facility]): new and requires workup  SIRS (systemic inflammatory response syndrome) ([facility] [address]): new and requires workup  Diagnosis management comments: Tachycardic, leukocytosis, oliguria with OMERO. Patient is post operative with mild, diffuse pains and intermittent abdominal distention. Plain films pending. Surgery paged and will review case. Has been to office this past week and continues to decline. Appears will need inpatient care until clinical improvement can be demonstrated in this patient of advanced age and s/s of SIRS. Surgery added additional studies. Reviewed findings with Dr. Mary Pak and suggested GI consultation and hospitalist admission. Will follow. Understands that vitals are still abnormal, but suggested may be due to liver disease that is not diagnosed yet. No infection sources identified, but personally felt that surgical complications needed to be considered thus contacted surgery first to consult. Dr. Roshni Horne also aware and will follow. Requested US tonight to complete studies he would like to review, but will also likely need diagnostic paracentesis and HIDA scan. Empiric antibiotics ordered since appears definitive diagnosis unclear. Dr. Dedra Quinn aware and will evaluate in ER.        Amount and/or Complexity of Data Reviewed  Clinical lab tests: ordered and reviewed (Results for orders placed or performed during the hospital encounter of 04/15/17  -CBC WITH AUTOMATED DIFF       Result                                            Value                         Ref Range                       WBC                                               22.6 (H)                      4.3 - 11.1 K/uL                 RBC                                               3.94 (L)                      4.05 - 5.25 M/uL                HGB 10.8 (L)                      11.7 - 15.4 g/dL                HCT                                               32.2 (L)                      35.8 - 46.3 %                   MCV                                               81.7                          79.6 - 97.8 FL                  MCH                                               27.4                          26.1 - 32.9 PG                  MCHC                                              33.5                          31.4 - 35.0 g/dL                RDW                                               13.1                          11.9 - 14.6 %                   PLATELET                                          896 (H)                       150 - 450 K/uL                  MPV                                               9.6 (L)                       10.8 - 14.1 FL                  NEUTROPHILS                                       83 (H)                        47 - 75 %                       BAND NEUTROPHILS                                  5                             0 - 10 %                        LYMPHOCYTES                                       7 (L)                         16 - 44 %                       MONOCYTES                                         5                             3 - 9 %                         ABS. NEUTROPHILS                                  19.9 (H)                      1.7 - 8.2 K/UL                  ABS.  LYMPHOCYTES                                  1.6                           0.5 - 4.6 K/UL                  ABS. MONOCYTES                                    1.1                           0.1 - 1.3 K/UL                  RBC COMMENTS                                                                                                SLIGHT   ANISOCYTOSIS + POIKILOCYTOSIS          PLATELET COMMENTS                                 MODERATE                                                      DF MANUAL                                                   -METABOLIC PANEL, COMPREHENSIVE       Result                                            Value                         Ref Range                       Sodium                                            135 (L)                       136 - 145 mmol/L                Potassium                                         4.7                           3.5 - 5.1 mmol/L                Chloride                                          101                           98 - 107 mmol/L                 CO2                                               22                            21 - 32 mmol/L                  Anion gap                                         12                            7 - 16 mmol/L                   Glucose                                           190 (H)                       65 - 100 mg/dL                  BUN                                               62 (H)                        8 - 23 MG/DL                    Creatinine                                        2.40 (H)                      0.6 - 1.0 MG/DL                 GFR est AA                                        25 (L)                        >60 ml/min/1.73m2               GFR est non-AA                                    21 (L)                        >60 ml/min/1.73m2               Calcium                                           9.8                           8.3 - 10.4 MG/DL                Bilirubin, total                                  1.8 (H)                       0.2 - 1.1 MG/DL                 ALT (SGPT)                                        48                            12 - 65 U/L                     AST (SGOT)                                        46 (H)                        15 - 37 U/L                     Alk. phosphatase                                  208 (H)                       50 - 136 U/L                    Protein, total 7.5                           6.3 - 8.2 g/dL                  Albumin                                           1.8 (L)                       3.2 - 4.6 g/dL                  Globulin                                          5.7 (H)                       2.3 - 3.5 g/dL                  A-G Ratio                                         0.3 (L)                       1.2 - 3.5                  -PROTHROMBIN TIME + INR       Result                                            Value                         Ref Range                       Prothrombin time                                  12.6 (H)                      9.6 - 12.0 sec                  INR                                               1.2                           0.9 - 1.2                  -POC LACTIC ACID       Result                                            Value                         Ref Range                       Lactic Acid (POC)                                 1.9                           0.5 - 1.9 mmol/L           )  Tests in the radiology section of CPT®: ordered and reviewed (Xr Chest Pa Lat    Result Date: 4/15/2017  Chest 2 view CLINICAL INDICATION: Acute dyspnea and weakness, recent abdomen surgery COMPARISON: No prior chest radiography available, correlation with abdominal series earlier today TECHNIQUE: Upright AP and lateral views of the chest FINDINGS: Lung volumes are slightly shallow leading to crowding. There is mild right hemidiaphragm elevation unchanged. Surgical clips again project over left axilla and mediastinum. Cardiomediastinal silhouette and hilar contours within normal limits. The lungs are clear. No acute osseous abnormalities are seen. IMPRESSION: No acute disease. Xr Abd Acute W 1 V Chest    Result Date: 4/15/2017  Chest and abdomen 3 views Exam date: 4/15/2017 CLINICAL INFORMATION: Postop distention. Loss of appetite. Shortness of breath. Surgery 3 weeks ago.  Single view the chest was obtained at a relatively poor degree of inspiration. Lungs are clear and vascularity normal. Heart is normal in size and mediastinum unremarkable. There are metallic clips at the left axilla. No pleural effusion. No free air under the diaphragm. Views of the abdomen show a nonspecific gas pattern and no abnormal calcification in the abdomen. A few phleboliths are present in the pelvis. There are metallic clips right upper quadrant from cholecystectomy. IMPRESSION: No acute abnormality    Ct Abd Pelv Wo Cont    Result Date: 4/15/2017   CT Abdomen and Pelvis:  4/15/2017  Clinical Information: Gallbladder surgery 3 weeks ago. Week and lack of appetite. Elevated creatinine Comparison CT: None Standard 5mm axial images of the abdomen and pelvis were obtained. This study was performed using oral but no intravenous contrast. Radiation dose reduction techniques were used for this study. Our scanners use one or all of the following:  Automated exposure control, adjustment of the mA and/or kV according to patient size, iterative reconstruction. CT Abdomen: 2 upper images show mild atelectatic change at the right lung base. There is a hiatal hernia. Spleen is unremarkable. Liver has a lobular contour and is somewhat small. No focal mass. Gallbladder has been removed. Pancreas is unremarkable. Adrenals are normal. Kidneys are normal in size. There is a 1.8 cm cyst mid right kidney. 1 mm nonobstructing stone midpole calyx left kidney. No hydronephrosis. The abdominal aorta is normal in size. Atherosclerotic calcification is present. No adenopathy. There is a large amount of ascites in the abdomen. CT Pelvis: Large amount of ascites is present. There is a small calcified fibroid in the uterus. No pelvic adenopathy. No evidence of bowel obstruction.       Impression: Large amount of ascites in the abdomen and pelvis      )      ED Course       Procedures

## 2017-04-15 NOTE — CONSULTS
[facility]  [address] [place] [address], [address] [place] [address]  [phone]    Office Note/H&P/Consult Note   Jazlyn Le   MRN: [id number]     : 1940        General Surgery Consult ordered by: Dr. Monalisa Roberts  Reason for Consult: Abd distention - pt 3 wks post cholecystectomy    HPI: Jazlyn Le is a 68 y.o.  female who presented to the ED 4/15/17 with c/o fatigue, decreased appetite, and abdominal distention. She is s/p lap patrice on 3/24/17 with Dr. Nayeli Vo. Path showed:   DIAGNOSIS  GALLBLADDER:  CHRONIC CHOLECYSTITIS. Electronically signed out on 3/27/2017 08:26 by Shaunna Jordan MD   Microscopic Description   Sections of gallbladder show scattered Rokitansky-Aschoff sinuses and chronic inflammatory cells. No dysplasia is seen. Dr. Hardy Seip concurs. She saw Dr. Nayeli Vo post-op last week. Pt states the symptoms started shortly after surgery and have worsened. She reports many years of daily alcohol use up until about 1 month ago. She denies fever, chills, CP, or emesis. BMs have been normal. Last BM today. 4/15/17 CT abd/pelvis with oral but no IV contrast    CT Abdomen:  2 upper images show mild atelectatic change at the right lung base. There is a hiatal hernia. Spleen is unremarkable. Liver has a lobular contour and is somewhat small. No focal mass. Gallbladder has been removed. Pancreas is unremarkable. Adrenals are normal. Kidneys are normal in size. There is a 1.8 cm cyst mid right kidney. 1  mm nonobstructing stone midpole calyx left kidney. No hydronephrosis. The abdominal aorta is normal in size. Atherosclerotic calcification is present. No adenopathy. There is a large amount of ascites in the abdomen.     CT Pelvis:  Large amount of ascites is present. There is a small calcified fibroid in the uterus. No pelvic adenopathy.   No evidence of bowel obstruction.         Impression:   Large amount of ascites in the abdomen and pelvis     4/15/17 CXR  FINDINGS: Lung volumes are slightly shallow leading to crowding. There is mild  right hemidiaphragm elevation unchanged. Surgical clips again project over left  axilla and mediastinum. Cardiomediastinal silhouette and hilar contours within  normal limits. The lungs are clear.      No acute osseous abnormalities are seen.     IMPRESSION: No acute disease.            Past Medical History:   Diagnosis Date    Benign essential hypertension     managed well with meds    Breast cancer ([address] 20 years ago    Left breast- treated with Chemo and radiation    DM type 2 (diabetes mellitus, type 2) (HCC)     oral agent only/AVG BS: 106/no s.s of of hypoglycemia/Last A1c 6.5    Former cigarette smoker     Gout     hx of - no recent episodes    Hypercholesterolemia     controlled well with meds    Hypothyroidism, acquired     managed well with Synthroid     Past Surgical History:   Procedure Laterality Date    HX BREAST LUMPECTOMY Left     HX CHOLECYSTECTOMY  2017    HX COLONOSCOPY      HX WISDOM TEETH EXTRACTION       No current facility-administered medications for this encounter. Current Outpatient Prescriptions   Medication Sig    oxyCODONE-acetaminophen (PERCOCET) 5-325 mg per tablet 1 Tab every eight (8) hours as needed.  raNITIdine (ZANTAC) 150 mg tablet Take 1 Tab by mouth two (2) times a day.  metFORMIN (GLUCOPHAGE) 500 mg tablet Take 500 mg by mouth daily.  benazepril-hydroCHLOROthiazide (LOTENSIN HCT) 20-12.5 mg per tablet Take 1 Tab by mouth daily.  fenofibrate (LOFIBRA) 160 mg tablet TAKE 1 TABLET BY MOUTH DAILY    raloxifene (EVISTA) 60 mg tablet Take 1 Tab by mouth daily.  rosuvastatin (CRESTOR) 10 mg tablet Take 1 Tab by mouth nightly.  levothyroxine (SYNTHROID) 75 mcg tablet Take 1 Tab by mouth Daily (before breakfast).  cholecalciferol (VITAMIN D3) 1,000 unit tablet Take  by mouth daily.     DOCOSAHEXANOIC ACID/EPA (FISH OIL PO) Take 1 Tab by mouth daily.  folic acid 959 mcg tablet Take 800 mcg by mouth daily. ALLERGIES:  Ciprofloxacin; Lipitor [atorvastatin]; and Ultram [tramadol]    Social History     Social History    Marital status:      Spouse name: N/A    Number of children: N/A    Years of education: N/A     Social History Main Topics    Smoking status: Former Smoker     Packs/day: 1.00     Years: 20.00     Types: Cigarettes     Quit date: 1/1/1983    Smokeless tobacco: Never Used    Alcohol use 0.0 oz/week     0 Standard drinks or equivalent per week      Comment: socially    Drug use: No    Sexual activity: Not on file     Other Topics Concern    Not on file     Social History Narrative    Lives at home with , Jose Antonio Honor     History   Smoking Status    Former Smoker    Packs/day: 1.00    Years: 20.00    Types: Cigarettes    Quit date: 1/1/1983   Smokeless Tobacco    Never Used     Family History   Problem Relation Age of Onset    Cancer Mother      Breast CA    Heart Disease Mother     Diabetes Father     Heart Attack Father      M.I       ROS: The patient has no difficulty with chest pain or shortness of breath. No fever or chills. Comprehensive review of systems was otherwise unremarkable except as noted above. Physical Exam:   Constitutional: Alert, cooperative, no acute distress. Visit Vitals    BP (!) 85/53    Pulse (!) 101    Temp 97.5 °F (36.4 °C)    Resp 22    Ht 5' 5\" (1.651 m)    Wt 143 lb (64.9 kg)    SpO2 96%    BMI 23.8 kg/m2     Eyes:Sclera are clear. ENMT: no obvious neck masses, no ear or lip lesions  CV: RRR. Normal perfusion  Resp: No JVD. Breathing is  non-labored. GI: tightly distended; minimal generalized ttp; no guarding or rebound BS active     Musculoskeletal: unremarkable with normal function.    Neuro:  AOx3, No obvious focal deficits  Psychiatric: normal affect and mood, no memory impairment      Labs:  Lab Results   Component Value Date/Time    WBC 22.6 04/15/2017 01:20 PM    HGB 10.8 04/15/2017 01:20 PM    PLATELET 883 92/48/8802 01:20 PM    Sodium 135 04/15/2017 01:20 PM    Potassium 4.7 04/15/2017 01:20 PM    Chloride 101 04/15/2017 01:20 PM    CO2 22 04/15/2017 01:20 PM    BUN 62 04/15/2017 01:20 PM    Creatinine 2.40 04/15/2017 01:20 PM    Glucose 190 04/15/2017 01:20 PM    INR 1.2 04/15/2017 01:20 PM    Bilirubin, total 1.8 04/15/2017 01:20 PM    AST (SGOT) 46 04/15/2017 01:20 PM    ALT (SGPT) 48 04/15/2017 01:20 PM    Alk. phosphatase 208 04/15/2017 01:20 PM    Amylase 86 03/14/2017 11:51 AM    Lipase 43 03/14/2017 11:51 AM         Assessment/Plan: Reed Chavarria is a 68 y.o. female who has signs and symptoms consistent with the below issues:  Problem List  Date Reviewed: 3/14/2017          Codes Class Noted    DM type 2 (diabetes mellitus, type 2) ([facility] ICD-10-CM: E11.9  ICD-9-CM: 250.00  Unknown        Hypercholesterolemia ICD-10-CM: E78.00  ICD-9-CM: 272.0  Unknown        Benign essential hypertension ICD-10-CM: I10  ICD-9-CM: 401.1  Unknown        Hypothyroidism, acquired ICD-10-CM: E03.9  ICD-9-CM: 244.9  Unknown            Plan:  Admit for new onset peritoneal fluid on CT and above symptoms  Hydrate aggressively for acute renal failure  U/s tonight to eval for ductal dilation    HIDA in am to rule out bile leak  If HIDA shows a bile leak--->IR drain + ERCP stent  If HIDA shows no leak--->IR drainage vs aspiration and eval for possible portal HTN/cirrhosis  She does report daily alcohol consumption for many years up until about 30 days ago    Follow T Bili and LFTs  Will follow      ANISHA Banks-BC    The patient was seen in conjunction with Dr. Supa Landeros who independently evaluated the patient, reviewed the chart and agreed with the assessment and plan. I have seen and examined the patient with the Nurse Practitioner and agree with the above assessment and plan.      Xiomara Nunes MD

## 2017-04-15 NOTE — IP AVS SNAPSHOT
Current Discharge Medication List  
  
START taking these medications Dose & Instructions Dispensing Information Comments Morning Noon Evening Bedtime  
 amoxicillin-clavulanate 875-125 mg per tablet Commonly known as:  AUGMENTIN Your last dose was: Your next dose is:    
   
   
 Dose:  1 Tab Take 1 Tab by mouth two (2) times a day for 7 days. Quantity:  14 Tab Refills:  0  
     
   
   
   
  
 oxyCODONE-acetaminophen 5-325 mg per tablet Commonly known as:  PERCOCET Your last dose was: Your next dose is:    
   
   
 1-2 tabs by mouth every four hours prn pain Quantity:  30 Tab Refills:  0  
     
   
   
   
  
 zolpidem 5 mg tablet Commonly known as:  AMBIEN Your last dose was: Your next dose is:    
   
   
 Dose:  5 mg Take 1 Tab by mouth nightly as needed for Sleep. Max Daily Amount: 5 mg. Quantity:  30 Tab Refills:  1 CONTINUE these medications which have NOT CHANGED Dose & Instructions Dispensing Information Comments Morning Noon Evening Bedtime  
 benazepril-hydroCHLOROthiazide 20-12.5 mg per tablet Commonly known as:  LOTENSIN HCT Your last dose was: Your next dose is:    
   
   
 Dose:  1 Tab Take 1 Tab by mouth daily. Quantity:  90 Tab Refills:  1  
     
   
   
   
  
 fenofibrate 160 mg tablet Commonly known as:  LOFIBRA Your last dose was: Your next dose is: TAKE 1 TABLET BY MOUTH DAILY Quantity:  90 Tab Refills:  0  
     
   
   
   
  
 FISH OIL PO Your last dose was: Your next dose is:    
   
   
 Dose:  1 Tab Take 1 Tab by mouth daily. Refills:  0  
     
   
   
   
  
 folic acid 601 mcg tablet Your last dose was: Your next dose is:    
   
   
 Dose:  800 mcg Take 800 mcg by mouth daily. Refills:  0  
     
   
   
   
  
 levothyroxine 75 mcg tablet Commonly known as:  SYNTHROID Your last dose was: Your next dose is:    
   
   
 Dose:  75 mcg Take 1 Tab by mouth Daily (before breakfast). Quantity:  90 Tab Refills:  3  
     
   
   
   
  
 metFORMIN 500 mg tablet Commonly known as:  GLUCOPHAGE Your last dose was: Your next dose is:    
   
   
 Dose:  500 mg Take 500 mg by mouth daily. Refills:  0  
     
   
   
   
  
 raloxifene 60 mg tablet Commonly known as:  EVISTA Your last dose was: Your next dose is:    
   
   
 Dose:  60 mg Take 1 Tab by mouth daily. Quantity:  90 Tab Refills:  3  
     
   
   
   
  
 raNITIdine 150 mg tablet Commonly known as:  ZANTAC Your last dose was: Your next dose is:    
   
   
 Dose:  150 mg Take 1 Tab by mouth two (2) times a day. Quantity:  60 Tab Refills:  0  
     
   
   
   
  
 rosuvastatin 10 mg tablet Commonly known as:  CRESTOR Your last dose was: Your next dose is:    
   
   
 Dose:  10 mg Take 1 Tab by mouth nightly. Quantity:  90 Tab Refills:  3 VITAMIN D3 1,000 unit tablet Generic drug:  cholecalciferol Your last dose was: Your next dose is: Take  by mouth daily. Refills:  0 Where to Get Your Medications Information on where to get these meds will be given to you by the nurse or doctor. ! Ask your nurse or doctor about these medications  
  amoxicillin-clavulanate 875-125 mg per tablet  
 oxyCODONE-acetaminophen 5-325 mg per tablet  
 zolpidem 5 mg tablet

## 2017-04-16 ENCOUNTER — APPOINTMENT (OUTPATIENT)
Dept: GENERAL RADIOLOGY | Age: 77
DRG: 871 | End: 2017-04-16
Attending: INTERNAL MEDICINE
Payer: MEDICARE

## 2017-04-16 ENCOUNTER — APPOINTMENT (OUTPATIENT)
Dept: NUCLEAR MEDICINE | Age: 77
DRG: 871 | End: 2017-04-16
Attending: INTERNAL MEDICINE
Payer: MEDICARE

## 2017-04-16 ENCOUNTER — ANESTHESIA EVENT (OUTPATIENT)
Dept: ENDOSCOPY | Age: 77
DRG: 871 | End: 2017-04-16
Payer: MEDICARE

## 2017-04-16 ENCOUNTER — ANESTHESIA (OUTPATIENT)
Dept: ENDOSCOPY | Age: 77
DRG: 871 | End: 2017-04-16
Payer: MEDICARE

## 2017-04-16 ENCOUNTER — APPOINTMENT (OUTPATIENT)
Dept: INTERVENTIONAL RADIOLOGY/VASCULAR | Age: 77
DRG: 871 | End: 2017-04-16
Attending: RADIOLOGY
Payer: MEDICARE

## 2017-04-16 PROBLEM — R65.21 SEVERE SEPSIS WITH SEPTIC SHOCK (HCC): Status: ACTIVE | Noted: 2017-04-15

## 2017-04-16 PROBLEM — D64.9 ANEMIA: Status: ACTIVE | Noted: 2017-04-16

## 2017-04-16 LAB
ALBUMIN SERPL BCP-MCNC: 1.4 G/DL (ref 3.2–4.6)
ALBUMIN SERPL BCP-MCNC: 1.5 G/DL (ref 3.2–4.6)
ALBUMIN/GLOB SERPL: 0.3 {RATIO} (ref 1.2–3.5)
ALBUMIN/GLOB SERPL: 0.4 {RATIO} (ref 1.2–3.5)
ALP SERPL-CCNC: 146 U/L (ref 50–136)
ALP SERPL-CCNC: 161 U/L (ref 50–136)
ALT SERPL-CCNC: 29 U/L (ref 12–65)
ALT SERPL-CCNC: 38 U/L (ref 12–65)
ANION GAP BLD CALC-SCNC: 10 MMOL/L (ref 7–16)
ANION GAP BLD CALC-SCNC: 11 MMOL/L (ref 7–16)
APPEARANCE FLD: NORMAL
ARTERIAL PATENCY WRIST A: POSITIVE
AST SERPL W P-5'-P-CCNC: 19 U/L (ref 15–37)
AST SERPL W P-5'-P-CCNC: 30 U/L (ref 15–37)
ATRIAL RATE: 103 BPM
ATRIAL RATE: 104 BPM
BACTERIA SPEC CULT: NORMAL
BASE DEFICIT BLDA-SCNC: 8.1 MMOL/L (ref 0–2)
BASOPHILS # BLD AUTO: 0 K/UL (ref 0–0.2)
BASOPHILS # BLD AUTO: 0 K/UL (ref 0–0.2)
BASOPHILS # BLD: 0 % (ref 0–2)
BASOPHILS # BLD: 0 % (ref 0–2)
BDY SITE: ABNORMAL
BILIRUB SERPL-MCNC: 1.6 MG/DL (ref 0.2–1.1)
BILIRUB SERPL-MCNC: 1.7 MG/DL (ref 0.2–1.1)
BUN SERPL-MCNC: 56 MG/DL (ref 8–23)
BUN SERPL-MCNC: 58 MG/DL (ref 8–23)
CALCIUM SERPL-MCNC: 8 MG/DL (ref 8.3–10.4)
CALCIUM SERPL-MCNC: 8.2 MG/DL (ref 8.3–10.4)
CALCULATED P AXIS, ECG09: 15 DEGREES
CALCULATED P AXIS, ECG09: 40 DEGREES
CALCULATED R AXIS, ECG10: 20 DEGREES
CALCULATED R AXIS, ECG10: 49 DEGREES
CALCULATED T AXIS, ECG11: -12 DEGREES
CALCULATED T AXIS, ECG11: 1 DEGREES
CHLORIDE SERPL-SCNC: 107 MMOL/L (ref 98–107)
CHLORIDE SERPL-SCNC: 112 MMOL/L (ref 98–107)
CO2 SERPL-SCNC: 20 MMOL/L (ref 21–32)
CO2 SERPL-SCNC: 21 MMOL/L (ref 21–32)
COHGB MFR BLD: 0.8 % (ref 0.5–1.5)
COLOR FLD: NORMAL
CREAT SERPL-MCNC: 1.87 MG/DL (ref 0.6–1)
CREAT SERPL-MCNC: 2.45 MG/DL (ref 0.6–1)
DIAGNOSIS, 93000: NORMAL
DIAGNOSIS, 93000: NORMAL
DIFFERENTIAL METHOD BLD: ABNORMAL
DIFFERENTIAL METHOD BLD: ABNORMAL
DO-HGB BLD-MCNC: 3 % (ref 0–5)
EOSINOPHIL # BLD: 0.2 K/UL (ref 0–0.8)
EOSINOPHIL # BLD: 0.2 K/UL (ref 0–0.8)
EOSINOPHIL NFR BLD: 1 % (ref 0.5–7.8)
EOSINOPHIL NFR BLD: 1 % (ref 0.5–7.8)
ERYTHROCYTE [DISTWIDTH] IN BLOOD BY AUTOMATED COUNT: 13.5 % (ref 11.9–14.6)
ERYTHROCYTE [DISTWIDTH] IN BLOOD BY AUTOMATED COUNT: 13.5 % (ref 11.9–14.6)
FIO2 ON VENT: 60 %
FLUID COMMENTS, FCOM: NORMAL
GLOBULIN SER CALC-MCNC: 4 G/DL (ref 2.3–3.5)
GLOBULIN SER CALC-MCNC: 4.4 G/DL (ref 2.3–3.5)
GLUCOSE SERPL-MCNC: 100 MG/DL (ref 65–100)
GLUCOSE SERPL-MCNC: 92 MG/DL (ref 65–100)
HCO3 BLDA-SCNC: 16 MMOL/L (ref 22–26)
HCT VFR BLD AUTO: 25 % (ref 35.8–46.3)
HCT VFR BLD AUTO: 27.8 % (ref 35.8–46.3)
HGB BLD-MCNC: 8.2 G/DL (ref 11.7–15.4)
HGB BLD-MCNC: 9.2 G/DL (ref 11.7–15.4)
HGB BLDMV-MCNC: 9.1 GM/DL (ref 11.7–15)
IMM GRANULOCYTES # BLD: 0.1 K/UL (ref 0–0.5)
IMM GRANULOCYTES # BLD: 0.1 K/UL (ref 0–0.5)
IMM GRANULOCYTES NFR BLD AUTO: 0.5 % (ref 0–5)
IMM GRANULOCYTES NFR BLD AUTO: 0.5 % (ref 0–5)
LYMPHOCYTES # BLD AUTO: 5 % (ref 13–44)
LYMPHOCYTES # BLD AUTO: 5 % (ref 13–44)
LYMPHOCYTES # BLD: 1.1 K/UL (ref 0.5–4.6)
LYMPHOCYTES # BLD: 1.2 K/UL (ref 0.5–4.6)
LYMPHOCYTES NFR FLD: 5 %
MCH RBC QN AUTO: 27 PG (ref 26.1–32.9)
MCH RBC QN AUTO: 27.4 PG (ref 26.1–32.9)
MCHC RBC AUTO-ENTMCNC: 32.8 G/DL (ref 31.4–35)
MCHC RBC AUTO-ENTMCNC: 33.1 G/DL (ref 31.4–35)
MCV RBC AUTO: 82.2 FL (ref 79.6–97.8)
MCV RBC AUTO: 82.7 FL (ref 79.6–97.8)
METHGB MFR BLD: 0.3 % (ref 0–1.5)
MONOCYTES # BLD: 0.6 K/UL (ref 0.1–1.3)
MONOCYTES # BLD: 0.6 K/UL (ref 0.1–1.3)
MONOCYTES NFR BLD AUTO: 3 % (ref 4–12)
MONOCYTES NFR BLD AUTO: 3 % (ref 4–12)
MONOS+MACROS NFR FLD: 2 %
NEUTS SEG # BLD: 19.6 K/UL (ref 1.7–8.2)
NEUTS SEG # BLD: 21.1 K/UL (ref 1.7–8.2)
NEUTS SEG NFR BLD AUTO: 91 % (ref 43–78)
NEUTS SEG NFR BLD AUTO: 91 % (ref 43–78)
NEUTS SEG NFR FLD: 93 %
NUC CELL # FLD: 864 /CU MM
OXYHGB MFR BLDA: 96.4 % (ref 94–97)
P-R INTERVAL, ECG05: 148 MS
P-R INTERVAL, ECG05: 148 MS
PCO2 BLDA: 28 MMHG (ref 35–45)
PEEP RESPIRATORY: 8 CM[H2O]
PH BLDA: 7.37 [PH] (ref 7.35–7.45)
PLATELET # BLD AUTO: 619 K/UL (ref 150–450)
PLATELET # BLD AUTO: 715 K/UL (ref 150–450)
PMV BLD AUTO: 9.4 FL (ref 10.8–14.1)
PMV BLD AUTO: 9.5 FL (ref 10.8–14.1)
PO2 BLDA: 96 MMHG (ref 75–100)
POTASSIUM SERPL-SCNC: 4.2 MMOL/L (ref 3.5–5.1)
POTASSIUM SERPL-SCNC: 4.5 MMOL/L (ref 3.5–5.1)
PROCALCITONIN SERPL-MCNC: 1.1 NG/ML
PROT SERPL-MCNC: 5.5 G/DL (ref 6.3–8.2)
PROT SERPL-MCNC: 5.8 G/DL (ref 6.3–8.2)
Q-T INTERVAL, ECG07: 338 MS
Q-T INTERVAL, ECG07: 338 MS
QRS DURATION, ECG06: 80 MS
QRS DURATION, ECG06: 82 MS
QTC CALCULATION (BEZET), ECG08: 442 MS
QTC CALCULATION (BEZET), ECG08: 444 MS
RBC # BLD AUTO: 3.04 M/UL (ref 4.05–5.25)
RBC # BLD AUTO: 3.36 M/UL (ref 4.05–5.25)
RBC # FLD: NORMAL /CU MM
RESP RATE: 16
SAO2 % BLD: 98 % (ref 92–98.5)
SERVICE CMNT-IMP: ABNORMAL
SERVICE CMNT-IMP: NORMAL
SODIUM SERPL-SCNC: 138 MMOL/L (ref 136–145)
SODIUM SERPL-SCNC: 143 MMOL/L (ref 136–145)
SPECIMEN SOURCE FLD: NORMAL
VENTILATION MODE VENT: ABNORMAL
VENTRICULAR RATE, ECG03: 103 BPM
VENTRICULAR RATE, ECG03: 104 BPM
VT SETTING VENT: 450 ML
WBC # BLD AUTO: 21.6 K/UL (ref 4.3–11.1)
WBC # BLD AUTO: 23.1 K/UL (ref 4.3–11.1)

## 2017-04-16 PROCEDURE — 65620000000 HC RM CCU GENERAL

## 2017-04-16 PROCEDURE — 80053 COMPREHEN METABOLIC PANEL: CPT | Performed by: INTERNAL MEDICINE

## 2017-04-16 PROCEDURE — C1769 GUIDE WIRE: HCPCS

## 2017-04-16 PROCEDURE — 77030007288 HC DEV LOK BILI BSC -A: Performed by: INTERNAL MEDICINE

## 2017-04-16 PROCEDURE — 77010033678 HC OXYGEN DAILY

## 2017-04-16 PROCEDURE — 77030025702 HC BG URIN DRN MRTM -A

## 2017-04-16 PROCEDURE — 74011000258 HC RX REV CODE- 258: Performed by: INTERNAL MEDICINE

## 2017-04-16 PROCEDURE — 74330 X-RAY BILE/PANC ENDOSCOPY: CPT

## 2017-04-16 PROCEDURE — 89050 BODY FLUID CELL COUNT: CPT | Performed by: SURGERY

## 2017-04-16 PROCEDURE — 74011000250 HC RX REV CODE- 250: Performed by: RADIOLOGY

## 2017-04-16 PROCEDURE — 76060000034 HC ANESTHESIA 1.5 TO 2 HR: Performed by: INTERNAL MEDICINE

## 2017-04-16 PROCEDURE — 74011250636 HC RX REV CODE- 250/636: Performed by: INTERNAL MEDICINE

## 2017-04-16 PROCEDURE — 76040000027: Performed by: INTERNAL MEDICINE

## 2017-04-16 PROCEDURE — 99153 MOD SED SAME PHYS/QHP EA: CPT

## 2017-04-16 PROCEDURE — 49406 IMAGE CATH FLUID PERI/RETRO: CPT

## 2017-04-16 PROCEDURE — 77030008703 HC TU ET UNCUF COVD -A: Performed by: ANESTHESIOLOGY

## 2017-04-16 PROCEDURE — C1894 INTRO/SHEATH, NON-LASER: HCPCS

## 2017-04-16 PROCEDURE — 87186 SC STD MICRODIL/AGAR DIL: CPT | Performed by: SURGERY

## 2017-04-16 PROCEDURE — 74011000250 HC RX REV CODE- 250

## 2017-04-16 PROCEDURE — 0F798DZ DILATION OF COMMON BILE DUCT WITH INTRALUMINAL DEVICE, VIA NATURAL OR ARTIFICIAL OPENING ENDOSCOPIC: ICD-10-PCS | Performed by: INTERNAL MEDICINE

## 2017-04-16 PROCEDURE — 87641 MR-STAPH DNA AMP PROBE: CPT | Performed by: INTERNAL MEDICINE

## 2017-04-16 PROCEDURE — C1729 CATH, DRAINAGE: HCPCS

## 2017-04-16 PROCEDURE — 80074 ACUTE HEPATITIS PANEL: CPT | Performed by: INTERNAL MEDICINE

## 2017-04-16 PROCEDURE — 74011250636 HC RX REV CODE- 250/636: Performed by: ANESTHESIOLOGY

## 2017-04-16 PROCEDURE — 0W9G30Z DRAINAGE OF PERITONEAL CAVITY WITH DRAINAGE DEVICE, PERCUTANEOUS APPROACH: ICD-10-PCS | Performed by: RADIOLOGY

## 2017-04-16 PROCEDURE — 84145 PROCALCITONIN (PCT): CPT | Performed by: INTERNAL MEDICINE

## 2017-04-16 PROCEDURE — 77030034850

## 2017-04-16 PROCEDURE — 74011636320 HC RX REV CODE- 636/320: Performed by: INTERNAL MEDICINE

## 2017-04-16 PROCEDURE — 99152 MOD SED SAME PHYS/QHP 5/>YRS: CPT

## 2017-04-16 PROCEDURE — 74011250636 HC RX REV CODE- 250/636

## 2017-04-16 PROCEDURE — 36600 WITHDRAWAL OF ARTERIAL BLOOD: CPT

## 2017-04-16 PROCEDURE — 0FC98ZZ EXTIRPATION OF MATTER FROM COMMON BILE DUCT, VIA NATURAL OR ARTIFICIAL OPENING ENDOSCOPIC: ICD-10-PCS | Performed by: INTERNAL MEDICINE

## 2017-04-16 PROCEDURE — A9537 TC99M MEBROFENIN: HCPCS

## 2017-04-16 PROCEDURE — 77030011229 HC DIL VESL COON COOK -A

## 2017-04-16 PROCEDURE — 77030032490 HC SLV COMPR SCD KNE COVD -B: Performed by: INTERNAL MEDICINE

## 2017-04-16 PROCEDURE — 87205 SMEAR GRAM STAIN: CPT | Performed by: SURGERY

## 2017-04-16 PROCEDURE — 85025 COMPLETE CBC W/AUTO DIFF WBC: CPT | Performed by: INTERNAL MEDICINE

## 2017-04-16 PROCEDURE — 36591 DRAW BLOOD OFF VENOUS DEVICE: CPT

## 2017-04-16 PROCEDURE — 77030013794 HC KT TRNSDUC BLD EDWD -B

## 2017-04-16 PROCEDURE — 94002 VENT MGMT INPAT INIT DAY: CPT

## 2017-04-16 PROCEDURE — 74011000250 HC RX REV CODE- 250: Performed by: INTERNAL MEDICINE

## 2017-04-16 PROCEDURE — 36415 COLL VENOUS BLD VENIPUNCTURE: CPT | Performed by: INTERNAL MEDICINE

## 2017-04-16 PROCEDURE — 77030019605

## 2017-04-16 PROCEDURE — BF111ZZ FLUOROSCOPY OF BILIARY AND PANCREATIC DUCTS USING LOW OSMOLAR CONTRAST: ICD-10-PCS | Performed by: INTERNAL MEDICINE

## 2017-04-16 PROCEDURE — 99233 SBSQ HOSP IP/OBS HIGH 50: CPT | Performed by: INTERNAL MEDICINE

## 2017-04-16 PROCEDURE — 77030011640 HC PAD GRND REM COVD -A: Performed by: INTERNAL MEDICINE

## 2017-04-16 PROCEDURE — 87077 CULTURE AEROBIC IDENTIFY: CPT | Performed by: SURGERY

## 2017-04-16 PROCEDURE — 77030009038 HC CATH BILI STN RTVR BSC -C: Performed by: INTERNAL MEDICINE

## 2017-04-16 PROCEDURE — 82803 BLOOD GASES ANY COMBINATION: CPT

## 2017-04-16 PROCEDURE — 74022 RADEX COMPL AQT ABD SERIES: CPT

## 2017-04-16 PROCEDURE — 87040 BLOOD CULTURE FOR BACTERIA: CPT | Performed by: INTERNAL MEDICINE

## 2017-04-16 PROCEDURE — C2625 STENT, NON-COR, TEM W/DEL SY: HCPCS | Performed by: INTERNAL MEDICINE

## 2017-04-16 PROCEDURE — 77030021678 HC GLIDESCP STAT DISP VERT -B: Performed by: ANESTHESIOLOGY

## 2017-04-16 PROCEDURE — 77030012595 HC SPHNTOM BILI BSC -D: Performed by: INTERNAL MEDICINE

## 2017-04-16 PROCEDURE — P9047 ALBUMIN (HUMAN), 25%, 50ML: HCPCS | Performed by: INTERNAL MEDICINE

## 2017-04-16 DEVICE — BILIARY STENT WITH RX DELIVERY SYSTEM
Type: IMPLANTABLE DEVICE | Site: BILE DUCT | Status: FUNCTIONAL
Brand: RX BILIARY

## 2017-04-16 RX ORDER — FENTANYL CITRATE-0.9 % NACL/PF 25 MCG/ML
25-200 PLASTIC BAG, INJECTION (ML) INJECTION
Status: DISCONTINUED | OUTPATIENT
Start: 2017-04-16 | End: 2017-04-16

## 2017-04-16 RX ORDER — MIDAZOLAM HYDROCHLORIDE 1 MG/ML
.5-2 INJECTION, SOLUTION INTRAMUSCULAR; INTRAVENOUS
Status: DISCONTINUED | OUTPATIENT
Start: 2017-04-16 | End: 2017-04-16

## 2017-04-16 RX ORDER — MIDAZOLAM HYDROCHLORIDE 1 MG/ML
2 INJECTION, SOLUTION INTRAMUSCULAR; INTRAVENOUS
Status: DISCONTINUED | OUTPATIENT
Start: 2017-04-16 | End: 2017-04-18 | Stop reason: ALTCHOICE

## 2017-04-16 RX ORDER — NOREPINEPHRINE BITARTRATE/D5W 4MG/250ML
2-16 PLASTIC BAG, INJECTION (ML) INTRAVENOUS ONCE
Status: DISCONTINUED | OUTPATIENT
Start: 2017-04-16 | End: 2017-04-16

## 2017-04-16 RX ORDER — FENTANYL CITRATE-0.9 % NACL/PF 25 MCG/ML
.5-1.5 PLASTIC BAG, INJECTION (ML) INJECTION
Status: DISCONTINUED | OUTPATIENT
Start: 2017-04-16 | End: 2017-04-16

## 2017-04-16 RX ORDER — VANCOMYCIN HYDROCHLORIDE
1250 SEE ADMIN INSTRUCTIONS
Status: DISCONTINUED | OUTPATIENT
Start: 2017-04-16 | End: 2017-04-16

## 2017-04-16 RX ORDER — ROCURONIUM BROMIDE 10 MG/ML
INJECTION, SOLUTION INTRAVENOUS AS NEEDED
Status: DISCONTINUED | OUTPATIENT
Start: 2017-04-16 | End: 2017-04-16 | Stop reason: HOSPADM

## 2017-04-16 RX ORDER — LIDOCAINE HYDROCHLORIDE 20 MG/ML
.5-2 INJECTION, SOLUTION INFILTRATION; PERINEURAL ONCE
Status: COMPLETED | OUTPATIENT
Start: 2017-04-16 | End: 2017-04-16

## 2017-04-16 RX ORDER — SODIUM CHLORIDE, SODIUM LACTATE, POTASSIUM CHLORIDE, CALCIUM CHLORIDE 600; 310; 30; 20 MG/100ML; MG/100ML; MG/100ML; MG/100ML
100 INJECTION, SOLUTION INTRAVENOUS CONTINUOUS
Status: DISCONTINUED | OUTPATIENT
Start: 2017-04-16 | End: 2017-04-16

## 2017-04-16 RX ORDER — PROPOFOL 10 MG/ML
INJECTION, EMULSION INTRAVENOUS AS NEEDED
Status: DISCONTINUED | OUTPATIENT
Start: 2017-04-16 | End: 2017-04-16 | Stop reason: HOSPADM

## 2017-04-16 RX ORDER — FENTANYL CITRATE 50 UG/ML
INJECTION, SOLUTION INTRAMUSCULAR; INTRAVENOUS AS NEEDED
Status: DISCONTINUED | OUTPATIENT
Start: 2017-04-16 | End: 2017-04-16 | Stop reason: HOSPADM

## 2017-04-16 RX ORDER — DIPHENHYDRAMINE HYDROCHLORIDE 50 MG/ML
12.5 INJECTION, SOLUTION INTRAMUSCULAR; INTRAVENOUS ONCE
Status: DISCONTINUED | OUTPATIENT
Start: 2017-04-16 | End: 2017-04-16

## 2017-04-16 RX ORDER — SODIUM CHLORIDE 0.9 % (FLUSH) 0.9 %
5-10 SYRINGE (ML) INJECTION AS NEEDED
Status: DISCONTINUED | OUTPATIENT
Start: 2017-04-16 | End: 2017-04-16

## 2017-04-16 RX ORDER — SODIUM CHLORIDE, SODIUM LACTATE, POTASSIUM CHLORIDE, CALCIUM CHLORIDE 600; 310; 30; 20 MG/100ML; MG/100ML; MG/100ML; MG/100ML
INJECTION, SOLUTION INTRAVENOUS
Status: DISCONTINUED | OUTPATIENT
Start: 2017-04-16 | End: 2017-04-16 | Stop reason: HOSPADM

## 2017-04-16 RX ORDER — NOREPINEPHRINE BITARTRATE/D5W 4MG/250ML
2-16 PLASTIC BAG, INJECTION (ML) INTRAVENOUS
Status: DISCONTINUED | OUTPATIENT
Start: 2017-04-16 | End: 2017-04-16

## 2017-04-16 RX ORDER — MORPHINE SULFATE 2 MG/ML
2 INJECTION, SOLUTION INTRAMUSCULAR; INTRAVENOUS
Status: DISCONTINUED | OUTPATIENT
Start: 2017-04-16 | End: 2017-04-22 | Stop reason: HOSPADM

## 2017-04-16 RX ORDER — SODIUM CHLORIDE 0.9 % (FLUSH) 0.9 %
10 SYRINGE (ML) INJECTION
Status: COMPLETED | OUTPATIENT
Start: 2017-04-16 | End: 2017-04-16

## 2017-04-16 RX ORDER — MORPHINE SULFATE 4 MG/ML
4 INJECTION, SOLUTION INTRAMUSCULAR; INTRAVENOUS
Status: DISCONTINUED | OUTPATIENT
Start: 2017-04-16 | End: 2017-04-22 | Stop reason: HOSPADM

## 2017-04-16 RX ORDER — SUCCINYLCHOLINE CHLORIDE 20 MG/ML
INJECTION INTRAMUSCULAR; INTRAVENOUS AS NEEDED
Status: DISCONTINUED | OUTPATIENT
Start: 2017-04-16 | End: 2017-04-16 | Stop reason: HOSPADM

## 2017-04-16 RX ORDER — NOREPINEPHRINE BITARTRATE/D5W 4MG/250ML
2-16 PLASTIC BAG, INJECTION (ML) INTRAVENOUS ONCE
Status: COMPLETED | OUTPATIENT
Start: 2017-04-16 | End: 2017-04-17

## 2017-04-16 RX ORDER — MORPHINE SULFATE 4 MG/ML
3 INJECTION, SOLUTION INTRAMUSCULAR; INTRAVENOUS
Status: DISCONTINUED | OUTPATIENT
Start: 2017-04-16 | End: 2017-04-22 | Stop reason: HOSPADM

## 2017-04-16 RX ORDER — LIDOCAINE HYDROCHLORIDE 20 MG/ML
INJECTION, SOLUTION EPIDURAL; INFILTRATION; INTRACAUDAL; PERINEURAL AS NEEDED
Status: DISCONTINUED | OUTPATIENT
Start: 2017-04-16 | End: 2017-04-16 | Stop reason: HOSPADM

## 2017-04-16 RX ORDER — OXYCODONE HYDROCHLORIDE 5 MG/1
5 TABLET ORAL
Status: DISCONTINUED | OUTPATIENT
Start: 2017-04-16 | End: 2017-04-16

## 2017-04-16 RX ORDER — HYDROMORPHONE HYDROCHLORIDE 2 MG/ML
0.5 INJECTION, SOLUTION INTRAMUSCULAR; INTRAVENOUS; SUBCUTANEOUS
Status: DISCONTINUED | OUTPATIENT
Start: 2017-04-16 | End: 2017-04-16

## 2017-04-16 RX ORDER — OXYCODONE AND ACETAMINOPHEN 5; 325 MG/1; MG/1
1 TABLET ORAL AS NEEDED
Status: DISCONTINUED | OUTPATIENT
Start: 2017-04-16 | End: 2017-04-16

## 2017-04-16 RX ADMIN — MIDAZOLAM HYDROCHLORIDE 1 MG: 1 INJECTION, SOLUTION INTRAMUSCULAR; INTRAVENOUS at 18:38

## 2017-04-16 RX ADMIN — SUCCINYLCHOLINE CHLORIDE 200 MG: 20 INJECTION INTRAMUSCULAR; INTRAVENOUS at 12:59

## 2017-04-16 RX ADMIN — ALBUMIN (HUMAN) 12.5 G: 0.25 INJECTION, SOLUTION INTRAVENOUS at 17:14

## 2017-04-16 RX ADMIN — Medication 2 MCG/MIN: at 20:29

## 2017-04-16 RX ADMIN — PIPERACILLIN SODIUM,TAZOBACTAM SODIUM 3.38 G: 3; .375 INJECTION, POWDER, FOR SOLUTION INTRAVENOUS at 07:05

## 2017-04-16 RX ADMIN — ALBUMIN (HUMAN) 12.5 G: 0.25 INJECTION, SOLUTION INTRAVENOUS at 07:06

## 2017-04-16 RX ADMIN — SODIUM CHLORIDE 447 ML: 900 INJECTION, SOLUTION INTRAVENOUS at 00:40

## 2017-04-16 RX ADMIN — Medication 3 MCG/MIN: at 22:38

## 2017-04-16 RX ADMIN — PROPOFOL 160 MG: 10 INJECTION, EMULSION INTRAVENOUS at 12:59

## 2017-04-16 RX ADMIN — ALBUMIN (HUMAN) 12.5 G: 0.25 INJECTION, SOLUTION INTRAVENOUS at 01:46

## 2017-04-16 RX ADMIN — ROCURONIUM BROMIDE 5 MG: 10 INJECTION, SOLUTION INTRAVENOUS at 12:59

## 2017-04-16 RX ADMIN — LIDOCAINE HYDROCHLORIDE 20 MG: 20 INJECTION, SOLUTION EPIDURAL; INFILTRATION; INTRACAUDAL; PERINEURAL at 12:59

## 2017-04-16 RX ADMIN — PIPERACILLIN SODIUM,TAZOBACTAM SODIUM 3.38 G: 3; .375 INJECTION, POWDER, FOR SOLUTION INTRAVENOUS at 23:26

## 2017-04-16 RX ADMIN — MIDAZOLAM HYDROCHLORIDE 2 MG: 1 INJECTION, SOLUTION INTRAMUSCULAR; INTRAVENOUS at 17:13

## 2017-04-16 RX ADMIN — IOVERSOL 16 ML: 741 INJECTION INTRA-ARTERIAL; INTRAVENOUS at 13:50

## 2017-04-16 RX ADMIN — Medication 0.5 MCG/KG/HR: at 15:23

## 2017-04-16 RX ADMIN — VANCOMYCIN HYDROCHLORIDE 1250 MG: 10 INJECTION, POWDER, LYOPHILIZED, FOR SOLUTION INTRAVENOUS at 00:50

## 2017-04-16 RX ADMIN — FENTANYL CITRATE 50 MCG: 50 INJECTION, SOLUTION INTRAMUSCULAR; INTRAVENOUS at 14:29

## 2017-04-16 RX ADMIN — SODIUM CHLORIDE, SODIUM LACTATE, POTASSIUM CHLORIDE, CALCIUM CHLORIDE: 600; 310; 30; 20 INJECTION, SOLUTION INTRAVENOUS at 12:53

## 2017-04-16 RX ADMIN — LIDOCAINE HYDROCHLORIDE 260 MG: 20 INJECTION, SOLUTION INFILTRATION; PERINEURAL at 18:41

## 2017-04-16 RX ADMIN — FENTANYL CITRATE 50 MCG: 50 INJECTION, SOLUTION INTRAMUSCULAR; INTRAVENOUS at 12:59

## 2017-04-16 RX ADMIN — Medication 10 ML: at 09:05

## 2017-04-16 RX ADMIN — ALBUMIN (HUMAN) 12.5 G: 0.25 INJECTION, SOLUTION INTRAVENOUS at 23:28

## 2017-04-16 RX ADMIN — SODIUM CHLORIDE, SODIUM LACTATE, POTASSIUM CHLORIDE, AND CALCIUM CHLORIDE 125 ML/HR: 600; 310; 30; 20 INJECTION, SOLUTION INTRAVENOUS at 08:22

## 2017-04-16 RX ADMIN — SODIUM CHLORIDE 1000 ML: 900 INJECTION, SOLUTION INTRAVENOUS at 01:21

## 2017-04-16 NOTE — PROCEDURES
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  Sample sent to laboratory for microbiology and cell count    Implants: Bilateral abdominal 12 Azeri percutaneous drainage catheters to gravity drainage    Findings: Over 2 L of bilious fluid was removed at the time of drainage catheter placement    Complications: None    Recommendations: Continued drainage    Follow Up: 2 weeks

## 2017-04-16 NOTE — PROGRESS NOTES
GI--labs noted. Patient is currently having her HIDA scan done. Will return later.   Thanks Radha Whyte MD

## 2017-04-16 NOTE — H&P
[facility] [address]   SURGICAL HISTORY AND PHYSICAL       Name:  Billy Doty   MR#:  [id number]   :  1940   Account #:  [de-identified]   Date of Adm:  04/15/2017       GASTROENTEROLOGY CONSULTATION    DATE OF CONSULTATION: 04/15/2017. REASON FOR ADMISSION: Abdominal distention, ascites on CAT scan,   and post cholecystectomy. SUBJECTIVE: This 55-year-old female is status post   cholecystectomy on 2017 with Dr. Pam Ríos. She is seen in   the ER because of dyspnea, abdominal distention, and significant   fatigue. The patient had an episode of abdominal pain last   October. She had a recurrent episode of abdominal pain located   in the right upper quadrant radiating to the back and shoulders   in March. For that reason, an ultrasound was obtained   (2017). There was an echogenic layer and debris within the   gallbladder, although no discrete gallstones were noted. There   was a thickened gallbladder wall (10 mm) as well as a 10 mm   common bile duct. There are \"probably some internal echoes   within the duct,\" although no discrete stones were noted. There   was no intrahepatic ductal dilatation. The patient subsequently had a HIDA scan on 2017, which   showed nonvisualization of gallbladder despite being given IV   morphine. There was prompt excretion in the biliary tree, common   bile duct, and small bowel. Because of nonvisualization of the gallbladder, the patient   underwent cholecystectomy (2017). There was no mention of   liver disease at that time. The patient was discharged to home   that day, but since then has been complaining of fatigue,   anorexia, and abdominal discomfort related to a \"pressure. \" She   has noticed increasing abdominal distention over that time to   the point where \"I've noticed some shortness of breath. \" She   denies any fevers or chills.  Her urine has been dark, but the   patient states, \"It has been dark off and on since last October. \" Again, no fevers or chills have been noted. When seen in the ER, she was noted to have abdominal distention. Lab work revealed renal insufficiency as well as abnormal liver   panel/liver enzymes. The renal insufficiency is new. A CAT scan   (noncontrast) was performed (see below) and because of this, the   patient is being admitted to the hospitalist and referral to me   has been made. The patient denies any prior history of liver   disease, congestive heart failure or renal disease, etc. She   does have a history of diabetes. PAST MEDICAL HISTORY:   1. Diabetes mellitus. 2. Hyperlipidemia. 3. Hypertension. 4. Hypothyroidism. 5. Status post cholecystectomy, 03/24/2017. MEDICATIONS ON ADMISSION:   1. Percocet. 2. Zantac. 3. Glucophage. 4. Lotensin. [address] 6. Evista. 7. Crestor. 8. Synthroid. 9. Fish oil tablets. 10. Folic acid. ALLERGIES:   1. CIPRO. 2. LIPITOR. 3. ULTRAM.    HABITS: Alcohol none. Smokes. FAMILY HISTORY: There is no family history of gastric or colon   carcinoma. REVIEW OF SYSTEMS   HEENT: Negative headache or blurred vision. CHEST: Negative cough, sputum production, hemoptysis. CARDIOVASCULAR: Negative for angina, PND, or orthopnea. ABDOMEN: Positive for abdominal distention, anorexia, and early   satiety. The patient does complain of some diffuse abdominal   discomfort related to a \"pressure. \"   : Negative for hematuria, frequency, dysuria. SKIN: Negative rash or itching. MUSCULOSKELETAL: Negative for joint swelling or redness. PSYCHIATRIC: Negative for anxiety or depression. NEUROLOGIC: Negative for unilateral weakness, headaches, blurred   vision. PHYSICAL EXAMINATION   GENERAL: Pleasant white female, no bjorn distress. The   patient is a middle-aged white female, no bjorn distress,   conversive and alert.    VITAL SIGNS: Temperature 97.5, pulse 101, respirations 22, blood   pressure has ranged anywhere from 76/50 to 141/111. HEENT: Conjunctivae are mildly pale. Sclerae are mildly icteric. Oropharynx revealed no oral lesions, ulcerations,   telangiectasias. CHEST: No wheeze. CARDIOVASCULAR: Regular rate and rhythm without murmurs,   gallops, or rubs. ABDOMEN: The abdomen is distended secondary to what appears to   be ascites. There are no masses. There is no tenderness to   palpation. There is a positive fluid wave present. There are   well-healed laparoscopic cholecystectomy scars. Liver is   percussed by scratch test to be approximately 10 cm from the   midclavicular line. There is no spleen tip palpable. Rectal   examination deferred. EXTREMITIES: No edema and pulses 2+. NEUROLOGIC: The patient is grossly intact. LABORATORY DATA: CBC has hemoglobin 10.8, hematocrit 32.2, white   count 22,600. On 03/14/2017, hemoglobin was 13.2, hematocrit   41.4, white count elevated, platelet count is 297,863. Differential   includes 5% bands, 83% segs, 7% lymphs. ProTime 12.6 and INR   1.2. CMP shows BUN of 62, creatinine 2.40 (on 03/14/2017, BUN   was 30, creatinine 0.94.) Liver panel shows SGOT of 46, SGPT 48. Alkaline phosphatase 208. Bilirubin of 1.8. On 03/14/2007, panel   was normal. Albumin is decreased to 1.8. Lipase is 284. REVIEW OF ABDOMINAL ULTRASOUND/HIDA SCAN: Recent ultrasound and   HIDA scans were reviewed. CHEST X-RAY: Chest x-ray shows no acute disease. ABDOMINAL AND PELVIC CT SCAN: The CT scan, noncontrast, shows a   somewhat small, lobular liver. No focal masses noted. Spleen is   unremarkable. A large amount of ascites in the abdomen was   present. A CT scan of the pelvis revealed calcified fibroid, but   otherwise was unremarkable except for large amount of ascites. IMPRESSION    1. Abdominal distention associated with ascites: Possible   etiologies include   a. Secondary to a bile leak post-cholecystectomy.    b. Chronic liver disease: Not previously known or reported by the patient or in the operative note. c. Nephrotic syndrome (Although urinalysis procedure urinalysis   does show protein in the urine.)   D. Hemoperitoneum (note decreased Hb). 2. Right heart failure (doubt). 3. Leukocytosis with abnormal liver panel: Cannot exclude retained   common duct debris with associated obstruction and/or bile leak. 4. Renal insufficiency, new onset. 5. Diabetes mellitus. 6. Hypertension. 7. Hyperlipidemia. 8. Status post cholecystectomy, 03/24/2017.   9. Hypothyroidism. 10. Multiple allergies. RECOMMENDATIONS/DIAGNOSTIC:   1. HIDA scan to exclude biliary leak. 2. Ultrasound of abdomen to check bile duct size. 3. Blood culture. 4. Urine culture. 5. Proceed with paracentesis depending on the paracentesis under   ultrasound guidance when available. 6. ERCP may well be necessary to exclude biliary obstruction,   bile leak, etc.. RECOMMENDATIONS/THERAPEUTIC:   1. IV albumin. 2. IV antibiotics empirically given the leukocytosis and history   of diabetes. 3. Monitor renal function as well as hepatic function carefully. Further recommendations pending results of above.         MD GAUDENICO Carvajal / Linn Mayes   D:  04/15/2017   21:27   T:  04/16/2017   00:16   Job #:  [id number]

## 2017-04-16 NOTE — PROGRESS NOTES
TRANSFER - IN REPORT:    Verbal report received from Surya Velazquez (name) on Darline Arteaga  being received from IR for routine progression of care      Report consisted of patients Situation, Background, Assessment and   Recommendations(SBAR). Information from the following report(s) Kardex, Procedure Summary, Intake/Output, MAR and Recent Results was reviewed with the receiving nurse. Opportunity for questions and clarification was provided. Assessment completed upon patients arrival to unit and care assumed.

## 2017-04-16 NOTE — PROGRESS NOTES
Nutrition:   Acknowledge Nutrition Consult for Electrolyte Replacement Management. (Dr. Claudine Bamberger)   The patient presents with no acute nutrition risk factors. Labs are wdl. Nutrition Support Orders for Electrolyte Replacement Management are now activated and on the MAR. Follow-up based on standards of care. Rosezella Auer Glynn Severe, [address] [facility]

## 2017-04-16 NOTE — PROGRESS NOTES
TRANSFER - OUT REPORT:    Verbal report given to duyen KWAN(name) on [employer]  being transferred to [facility](unit) for routine progression of care       Report consisted of patients Situation, Background, Assessment and   Recommendations(SBAR). Information from the following report(s) SBAR, Procedure Summary and MAR was reviewed with the receiving nurse. Lines:   Quad Lumen quad lumen catheter 04/15/17 Right Neck (Active)   Central Line Being Utilized Yes 4/16/2017  7:00 AM   Criteria for Appropriate Use Hemodynamically unstable, requiring monitoring lines, vasopressors, or volume resuscitation 4/16/2017  7:00 AM   Site Assessment Clean, dry, & intact 4/16/2017  7:00 AM   Infiltration Assessment 0 4/16/2017  7:00 AM   Affected Extremity/Extremities Color distal to insertion site pink (or appropriate for race); Pulses palpable;Range of motion performed 4/16/2017  7:00 AM   Date of Last Dressing Change 04/16/17 4/16/2017  7:00 AM   Dressing Status Clean, dry, & intact 4/16/2017  7:00 AM   Dressing Type Transparent;Disk with Chlorhexadine gluconate (CHG) 4/16/2017  7:00 AM   Proximal Hub Color/Line Status Patent; Flushed; Infusing 4/16/2017  7:00 AM   Positive Blood Return (Medial Site) Yes 4/16/2017  7:00 AM   Medial 1 Hub Color/Line Status Patent; Flushed; Infusing 4/16/2017  7:00 AM   Positive Blood Return (Lateral Site) Yes 4/16/2017  7:00 AM   Medial 2 Hub Color/Line Status Patent; Flushed; Infusing 4/16/2017  7:00 AM   Positive Blood Return (Site #3) Yes 4/16/2017  7:00 AM   Distal Hub Color/Line Status Patent; Flushed 4/16/2017  7:00 AM   Positive Blood Return (Site #4) Yes 4/16/2017  7:00 AM   Alcohol Cap Used No 4/16/2017  7:00 AM       Peripheral IV 04/15/17 Left Antecubital (Active)   Site Assessment Clean, dry, & intact 4/16/2017  7:00 AM   Phlebitis Assessment 0 4/16/2017  7:00 AM   Infiltration Assessment 0 4/16/2017  7:00 AM   Dressing Status Clean, dry, & intact 4/16/2017  7:00 AM   Dressing Type Transparent;Tape 4/16/2017  7:00 AM   Hub Color/Line Status Patent; Flushed 4/16/2017  7:00 AM        Opportunity for questions and clarification was provided.       Patient transported with:

## 2017-04-16 NOTE — PROGRESS NOTES
Hospitalist Progress Note    Patient: Salazar Mills MRN: [id number]  SSN: [id number]    YOB: 1940  Age: 68 y.o. Sex: female      Admit Date: 4/15/2017    LOS: 1 day     Subjective:     68year old CF with a  PMH of hypothyroidism and GERD presented to the ER with abdominal distension and pain x 3 weeks since she had an elective lap patrice. She was seen by her surgeon, who told her that it would improve over time. In the ER, she was found to also have OMERO and be hypotensive. She was admitted to the CCU for IVFs and possible pressors. 4/16 - The patient feels better overall this AM. Still has abdominal distension and pain. Denies F/C/N/V. Normal BMs. No jaundice. No CP/SOB. Review of systems negative except stated above. Objective:     Vitals:    04/16/17 0645 04/16/17 0700 04/16/17 0710 04/16/17 0733   BP: 101/45 97/42 100/47 103/45   Pulse: 99 100 (!) 102 (!) 104   Resp: 23 24 22 27   Temp:       SpO2: 90% 92% 94% 93%   Weight:       Height:            Physical Exam:   GENERAL: alert, cooperative, no distress, appears stated age  EYE: conjunctivae/corneas clear. PERRL. No icterus  THROAT & NECK: normal and no erythema or exudates noted. LUNG: clear to auscultation bilaterally  HEART: tachy, reg rhythm S1S2, no murmur, no JVD  ABDOMEN: soft, firm and distended. Bowel sounds normal. No fluid wave. EXTREMITIES:  No edema, 2+ pedal/radial pulses bilaterally  SKIN: no rash or abnormalities  NEUROLOGIC: A&Ox3. Cranial nerves 2-12 grossly intact.     Lab/Data Review:  CMP:   Lab Results   Component Value Date/Time     04/16/2017 04:00 AM    K 4.2 04/16/2017 04:00 AM     (H) 04/16/2017 04:00 AM    CO2 20 (L) 04/16/2017 04:00 AM    AGAP 11 04/16/2017 04:00 AM    GLU 92 04/16/2017 04:00 AM    BUN 56 (H) 04/16/2017 04:00 AM    CREA 1.87 (H) 04/16/2017 04:00 AM    GFRAA 34 (L) 04/16/2017 04:00 AM    GFRNA 28 (L) 04/16/2017 04:00 AM    CA 8.0 (L) 04/16/2017 04:00 AM    ALB 1.5 (L) 04/16/2017 04:00 AM    TP 5.5 (L) 04/16/2017 04:00 AM    GLOB 4.0 (H) 04/16/2017 04:00 AM    AGRAT 0.4 (L) 04/16/2017 04:00 AM    SGOT 30 04/16/2017 04:00 AM    ALT 29 04/16/2017 04:00 AM     CBC:   Lab Results   Component Value Date/Time    WBC 21.6 (H) 04/16/2017 04:00 AM    HGB 8.2 (L) 04/16/2017 04:00 AM    HCT 25.0 (L) 04/16/2017 04:00 AM     (H) 04/16/2017 04:00 AM     Recent Glucose Results:   Lab Results   Component Value Date/Time    GLU 92 04/16/2017 04:00 AM     04/16/2017 12:30 AM     (H) 04/15/2017 01:20 PM        Imaging:    CT A/P  Large amount of ascites is present. There is a small calcified fibroid in the  uterus. No pelvic adenopathy.   No evidence of bowel obstruction. Assessment:     Principal Problem:    Sepsis ([address] (4/15/2017)    Active Problems:    Ascites (4/15/2017)      Abdominal distension (4/15/2017)      Hypotension (4/15/2017)      S/P laparoscopic cholecystectomy (4/15/2017)      Overview: 3 weeks ago by Dr. Miguel Blas      OMERO (acute kidney injury) ([address] (4/15/2017)      Leukocytosis (4/15/2017)      Septic shock ([address] (4/15/2017)      Protein calorie malnutrition ([address] (4/15/2017)      Overview: With Albumin < 2 currently.        Anemia (4/16/2017)        Plan:     - Hold pressors as BP stable on IVFs  - Hgb 10.8 --> 8.2 - likely dilutional - hemoccult pending  - Will need paracentesis today - hopefully from GI  - Check HIDA scan  - Getting Albumin IV Q8H x 6 doses  - Continue Zosyn - WBC slowly improving 23.1 --> 21.6  - Appreciate GI, general surgery, and ICU's input and assistance    DIET: NPO  FLUIDS: LR @ 125 ml/hr  PAIN CONTROL: Percocet PRN  DVT PROPHYLAXIS: SCDs  CATHETERS/LINES: R IJ (4/16)    Signed By: Lili He DO     April 16, 2017

## 2017-04-16 NOTE — PROGRESS NOTES
TRANSFER - OUT REPORT:    Verbal report given to Olinda Vela RN (name) on [employer]  being transferred to  IR (unit) for ordered procedure       Report consisted of patients Situation, Background, Assessment and   Recommendations(SBAR). Information from the following report(s) SBAR, Kardex, Intake/Output, Recent Results and Cardiac Rhythm NSR was reviewed with the receiving nurse. Lines:   Quad Lumen quad lumen catheter 04/15/17 Right Neck (Active)   Central Line Being Utilized Yes 4/16/2017  7:00 AM   Criteria for Appropriate Use Hemodynamically unstable, requiring monitoring lines, vasopressors, or volume resuscitation 4/16/2017  7:00 AM   Site Assessment Clean, dry, & intact 4/16/2017  7:00 AM   Infiltration Assessment 0 4/16/2017  7:00 AM   Affected Extremity/Extremities Color distal to insertion site pink (or appropriate for race); Pulses palpable;Range of motion performed 4/16/2017  7:00 AM   Date of Last Dressing Change 04/16/17 4/16/2017  7:00 AM   Dressing Status Clean, dry, & intact 4/16/2017  7:00 AM   Dressing Type Transparent;Disk with Chlorhexadine gluconate (CHG) 4/16/2017  7:00 AM   Proximal Hub Color/Line Status Patent; Flushed; Infusing 4/16/2017  7:00 AM   Positive Blood Return (Medial Site) Yes 4/16/2017  7:00 AM   Medial 1 Hub Color/Line Status Patent; Flushed; Infusing 4/16/2017  7:00 AM   Positive Blood Return (Lateral Site) Yes 4/16/2017  7:00 AM   Medial 2 Hub Color/Line Status Patent; Flushed; Infusing 4/16/2017  7:00 AM   Positive Blood Return (Site #3) Yes 4/16/2017  7:00 AM   Distal Hub Color/Line Status Patent; Flushed 4/16/2017  7:00 AM   Positive Blood Return (Site #4) Yes 4/16/2017  7:00 AM   Alcohol Cap Used No 4/16/2017  7:00 AM       Peripheral IV 04/15/17 Left Antecubital (Active)   Site Assessment Clean, dry, & intact 4/16/2017  7:00 AM   Phlebitis Assessment 0 4/16/2017  7:00 AM   Infiltration Assessment 0 4/16/2017  7:00 AM   Dressing Status Clean, dry, & intact 4/16/2017  7:00 AM   Dressing Type Transparent;Tape 4/16/2017  7:00 AM   Hub Color/Line Status Patent; Flushed 4/16/2017  7:00 AM        Opportunity for questions and clarification was provided.       Patient transported with:   Monitor  Registered Nurse   Satya  RT

## 2017-04-16 NOTE — PROGRESS NOTES
TRANSFER - IN REPORT:    Verbal report received from ANIYA Weir(name) on [employer]  being received from CCU (unit) for routine progression of care      Report consisted of patients Situation, Background, Assessment and   Recommendations(SBAR). Information from the following report(s) SBAR, Kardex, ED Summary, Procedure Summary, Intake/Output, MAR, Recent Results and Cardiac Rhythm NSR. was reviewed with the receiving nurse. Opportunity for questions and clarification was provided. Assessment completed upon patients arrival to unit and care assumed.

## 2017-04-16 NOTE — H&P
History and Physical    Subjective:     Claude Cabrera is a 68 y.o.  female who presents with abdominal distension and pain since her lap patrice 3 weeks ago. She was seen in FU with Dr. HERNANDEZ [facility] since surgery but has continued to get weaker and weaker with diminished PO intake. Her Abd has been swelling almost immediately after surgery. She denies any fevers or chills. BM has been normal. No Nausea or vomiting. Upon arrival she was initially hypertensive but since she has become hypotensive. Has received only 500 cc of NS. Zosyn started given her WBC of 22 K. Surgery feels this is liver failure and she needs GI workup. Therefore hospitalist was asked to admit. Past Medical History:   Diagnosis Date    Benign essential hypertension     managed well with meds    Breast cancer ([address] 20 years ago    Left breast- treated with Chemo and radiation    DM type 2 (diabetes mellitus, type 2) (HCC)     oral agent only/AVG BS: 106/no s.s of of hypoglycemia/Last A1c 6.5    Former cigarette smoker     Gout     hx of - no recent episodes    Hypercholesterolemia     controlled well with meds    Hypothyroidism, acquired     managed well with Synthroid      Past Surgical History:   Procedure Laterality Date    HX BREAST LUMPECTOMY Left     HX CHOLECYSTECTOMY  2017    HX COLONOSCOPY      HX WISDOM TEETH EXTRACTION       Family History   Problem Relation Age of Onset    Cancer Mother      Breast CA    Heart Disease Mother     Diabetes Father     Heart Attack Father      M.I      Social History   Substance Use Topics    Smoking status: Former Smoker     Packs/day: 1.00     Years: 20.00     Types: Cigarettes     Quit date: 1/1/1983    Smokeless tobacco: Never Used    Alcohol use 0.0 oz/week     0 Standard drinks or equivalent per week      Comment: socially       Prior to Admission medications    Medication Sig Start Date End Date Taking?  Authorizing Provider   oxyCODONE-acetaminophen (PERCOCET) 5-325 mg per tablet 1 Tab every eight (8) hours as needed. 3/24/17   Historical Provider   raNITIdine (ZANTAC) 150 mg tablet Take 1 Tab by mouth two (2) times a day. 4/10/17   Fatimah Del Rosario DO   metFORMIN (GLUCOPHAGE) 500 mg tablet Take 500 mg by mouth daily. Historical Provider   benazepril-hydroCHLOROthiazide (LOTENSIN HCT) 20-12.5 mg per tablet Take 1 Tab by mouth daily. 3/6/17   Mumtaz Ram PA-C   fenofibrate (LOFIBRA) 160 mg tablet TAKE 1 TABLET BY MOUTH DAILY 9/26/16   Mumtaz BackWIL   raloxifene (EVISTA) 60 mg tablet Take 1 Tab by mouth daily. 9/13/16   [place] BackWIL   rosuvastatin (CRESTOR) 10 mg tablet Take 1 Tab by mouth nightly. 9/13/16   Mumtaz BackWIL   levothyroxine (SYNTHROID) 75 mcg tablet Take 1 Tab by mouth Daily (before breakfast). 9/13/16   Mumtaz BackWIL   cholecalciferol (VITAMIN D3) 1,000 unit tablet Take  by mouth daily. Historical Provider   DOCOSAHEXANOIC ACID/EPA (FISH OIL PO) Take 1 Tab by mouth daily. Historical Provider   folic acid 716 mcg tablet Take 800 mcg by mouth daily. Historical Provider     Allergies   Allergen Reactions    Ciprofloxacin Other (comments)     Complains of pain in abd    Lipitor [Atorvastatin] Palpitations    Ultram [Tramadol] Nausea Only        Review of Systems:  A comprehensive review of systems was negative except for that written in the History of Present Illness. Objective:      Intake and Output:            Physical Exam:   Visit Vitals    BP (!) 85/53    Pulse (!) 101    Temp 97.5 °F (36.4 °C)    Resp 22    Ht 5' 5\" (1.651 m)    Wt 64.9 kg (143 lb)    SpO2 96%    BMI 23.8 kg/m2     General appearance: alert, cooperative, no distress, appears stated age  Head: Normocephalic, without obvious abnormality, atraumatic  Throat: abnormal findings: dry mm  Lungs: clear to auscultation bilaterally  Heart: regular rate and rhythm, S1, S2 normal, no murmur, click, rub or gallop  Abdomen: soft, non-tender. Bowel sounds normal. No masses,  no organomegaly  Extremities: extremities normal, atraumatic, no cyanosis or edema  Skin: Skin color, texture, turgor normal. No rashes or lesions, surgical wounds look good without signs of infection. Neurologic: Grossly normal        Data Review:   Recent Results (from the past 24 hour(s))   CBC WITH AUTOMATED DIFF    Collection Time: 04/15/17  1:20 PM   Result Value Ref Range    WBC 22.6 (H) 4.3 - 11.1 K/uL    RBC 3.94 (L) 4.05 - 5.25 M/uL    HGB 10.8 (L) 11.7 - 15.4 g/dL    HCT 32.2 (L) 35.8 - 46.3 %    MCV 81.7 79.6 - 97.8 FL    MCH 27.4 26.1 - 32.9 PG    MCHC 33.5 31.4 - 35.0 g/dL    RDW 13.1 11.9 - 14.6 %    PLATELET 219 (H) 086 - 450 K/uL    MPV 9.6 (L) 10.8 - 14.1 FL    NEUTROPHILS 83 (H) 47 - 75 %    BAND NEUTROPHILS 5 0 - 10 %    LYMPHOCYTES 7 (L) 16 - 44 %    MONOCYTES 5 3 - 9 %    ABS. NEUTROPHILS 19.9 (H) 1.7 - 8.2 K/UL    ABS. LYMPHOCYTES 1.6 0.5 - 4.6 K/UL    ABS. MONOCYTES 1.1 0.1 - 1.3 K/UL    RBC COMMENTS SLIGHT  ANISOCYTOSIS + POIKILOCYTOSIS        PLATELET COMMENTS MODERATE      DF MANUAL     METABOLIC PANEL, COMPREHENSIVE    Collection Time: 04/15/17  1:20 PM   Result Value Ref Range    Sodium 135 (L) 136 - 145 mmol/L    Potassium 4.7 3.5 - 5.1 mmol/L    Chloride 101 98 - 107 mmol/L    CO2 22 21 - 32 mmol/L    Anion gap 12 7 - 16 mmol/L    Glucose 190 (H) 65 - 100 mg/dL    BUN 62 (H) 8 - 23 MG/DL    Creatinine 2.40 (H) 0.6 - 1.0 MG/DL    GFR est AA 25 (L) >60 ml/min/1.73m2    GFR est non-AA 21 (L) >60 ml/min/1.73m2    Calcium 9.8 8.3 - 10.4 MG/DL    Bilirubin, total 1.8 (H) 0.2 - 1.1 MG/DL    ALT (SGPT) 48 12 - 65 U/L    AST (SGOT) 46 (H) 15 - 37 U/L    Alk.  phosphatase 208 (H) 50 - 136 U/L    Protein, total 7.5 6.3 - 8.2 g/dL    Albumin 1.8 (L) 3.2 - 4.6 g/dL    Globulin 5.7 (H) 2.3 - 3.5 g/dL    A-G Ratio 0.3 (L) 1.2 - 3.5     PROTHROMBIN TIME + INR    Collection Time: 04/15/17  1:20 PM   Result Value Ref Range Prothrombin time 12.6 (H) 9.6 - 12.0 sec    INR 1.2 0.9 - 1.2     LIPASE    Collection Time: 04/15/17  1:20 PM   Result Value Ref Range    Lipase 284 73 - 393 U/L   BILIRUBIN, DIRECT    Collection Time: 04/15/17  1:20 PM   Result Value Ref Range    Bilirubin, direct 1.0 (H) <0.4 MG/DL   PROCALCITONIN    Collection Time: 04/15/17  1:20 PM   Result Value Ref Range    Procalcitonin 0.4 ng/mL   POC LACTIC ACID    Collection Time: 04/15/17  3:21 PM   Result Value Ref Range    Lactic Acid (POC) 1.9 0.5 - 1.9 mmol/L   URINALYSIS W/ RFLX MICROSCOPIC    Collection Time: 04/15/17  4:42 PM   Result Value Ref Range    Color YELLOW      Appearance TURBID      Specific gravity 1.026 (H) 1.001 - 1.023      pH (UA) 5.0 5.0 - 9.0      Protein 30 (A) NEG mg/dL    Glucose NEGATIVE  mg/dL    Ketone TRACE (A) NEG mg/dL    Bilirubin LARGE (A) NEG      Blood NEGATIVE  NEG      Urobilinogen 0.2 0.2 - 1.0 EU/dL    Nitrites NEGATIVE  NEG      Leukocyte Esterase SMALL (A) NEG      WBC 3-5 0 /hpf    RBC 0-3 0 /hpf    Epithelial cells 0-3 0 /hpf    Bacteria TRACE 0 /hpf    Casts HYALINE 0 /lpf    Crystals OCCASIONAL 0 /LPF   EKG, 12 LEAD, INITIAL    Collection Time: 04/15/17  4:47 PM   Result Value Ref Range    Ventricular Rate 103 BPM    Atrial Rate 103 BPM    P-R Interval 148 ms    QRS Duration 82 ms    Q-T Interval 338 ms    QTC Calculation (Bezet) 442 ms    Calculated P Axis 40 degrees    Calculated R Axis 49 degrees    Calculated T Axis -12 degrees    Diagnosis       !! AGE AND GENDER SPECIFIC ECG ANALYSIS !! Sinus tachycardia  Nonspecific T wave abnormality  Abnormal ECG  When compared with ECG of 15-APR-2017 16:47,  Sinus rhythm has replaced Atrial fibrillation  T wave inversion less evident in Inferior leads         EKG reviewed by me and is ST.      Assessment:     Principal Problem:    Sepsis ([address] (4/15/2017)    Active Problems:    Ascites (4/15/2017)      Abdominal distension (4/15/2017)      Hypotension (4/15/2017)      S/P laparoscopic cholecystectomy (4/15/2017)      Overview: 3 weeks ago by Dr. Wilmer Henderson      OMERO (acute kidney injury) [facility]) (4/15/2017)        Plan:     Admit to ICU given low BP  Sepsis fluid challenge  May need CVC with pressors. Will volume resuscitate first.   I do not think this is acute liver failure as her enzymes, coags, and bili are not significantly elevated. Agree with Cat Ovalles for possible bile leak rule out. Appreciate specialists.    FULL code  NPO    Signed By: Maria Antonia Fitzgerald DO     April 15, 2017

## 2017-04-16 NOTE — PROGRESS NOTES
Patient has been largely restful over the past several hours- resting in bed with eyes closed and even non-labored respirations. When awoken, patient fully alert/oriented and reports only minimal pain to ABD. UOP as noted, dark initially with improvement in color moving more towards a light aram.

## 2017-04-16 NOTE — PROGRESS NOTES
There had been some confusion from the ED with how much fluid had been given. It now stands that 1500cc given in ER- completing the sepsis bolus dose. Delay in initiating vanco due to 2nd set of BC being drawn from lab. Now drawn with BC x 2 sent, ABX infusing. We attempted to place a ballesteros per order. Myself, RN Gen and CC Estrella Laughter all attempted with difficulty due to some abnormal anatomy. For now, will utilize bedpan. Patient alert/oriented, restful in bed with no needs expressed. BP remains low- will contact MD for need for pressers if BP doesn't improve shortly.

## 2017-04-16 NOTE — PROGRESS NOTES
Pharmacokinetic Consult to Pharmacist    Sridhar Stacycandice is a 68 y.o. female being treated for possible intra-abdominal infection with Vancomycin and Zosyn. Has ascites - S/P Lap Cholecystectomy about 3 weeks ago. The patient underwent ERCP with stent placement on 4/16/17. Height: 5' 5\" (165.1 cm)  Weight: 68.6 kg (151 lb 3.8 oz)  Lab Results   Component Value Date/Time    BUN 56 04/16/2017 04:00 AM    Creatinine 1.87 04/16/2017 04:00 AM    WBC 21.6 04/16/2017 04:00 AM    Procalcitonin 1.1 04/16/2017 12:30 AM      Estimated Creatinine Clearance: 24.9 mL/min (based on Cr of 1.87). CULTURES:  4/15   Urine   No growth to date, pending     Blood X 2  Pending     MRSA screen  Negative       Day 1 of vancomycin. Goal trough is 15 - 20 . As patient's renal function has already improve, will schedule patient on 1000 mg Q24H moving forward. Plan to check vancomycin trough level prior to the 3rd or 4th dose, depending on renal function tomorrow. Will continue to follow the patient.       Thank you,  Elliott Goldman, PharmD  Clinical Pharmacist  [phone]

## 2017-04-16 NOTE — ANESTHESIA POSTPROCEDURE EVALUATION
Post-Anesthesia Evaluation and Assessment    Patient: Keesha Sanchez MRN: [id number]  SSN: [id number]    YOB: 1940  Age: 68 y.o. Sex: female       Cardiovascular Function/Vital Signs  Visit Vitals    BP 95/52    Pulse (!) 119    Temp 36.4 °C (97.6 °F)    Resp 27    Ht 5' 5\" (1.651 m)    Wt 68.6 kg (151 lb 3.8 oz)    SpO2 97%    Breastfeeding No    BMI 25.17 kg/m2       Patient is status post general anesthesia for Procedure(s):  ENDOSCOPIC RETROGRADE CHOLANGIOPANCREATOGRAPHY  ENDOSCOPIC SPHINCTEROTOMY  ENDOSCOPIC STONE EXTRACTION/BALLOON SWEEP  BILIARY STENT PLACEMENT. Nausea/Vomiting: None    Postoperative hydration reviewed and adequate. Pain:  Pain Scale 1: Numeric (0 - 10) (04/16/17 1258)  Pain Intensity 1: 0 (04/16/17 1258)   Managed    Neurological Status:   Neuro  Neurologic State: Alert (04/15/17 2315)  Orientation Level: Appropriate for age;Oriented X4 (04/15/17 2315)  Cognition: Appropriate decision making; Appropriate for age attention/concentration; Appropriate safety awareness; Follows commands (04/15/17 2315)  Speech: Clear (04/15/17 2315)  Assessment L Pupil: Brisk;Round (04/15/17 2315)  Size L Pupil (mm): 3 (04/15/17 2315)  Assessment R Pupil: Brisk;Round (04/15/17 2315)  Size R Pupil (mm): 3 (04/15/17 2315)  LUE Motor Response: Purposeful;Spontaneous  (04/15/17 2315)  LLE Motor Response: Purposeful;Spontaneous  (04/15/17 2315)  RUE Motor Response: Purposeful;Spontaneous  (04/15/17 2315)  RLE Motor Response: Purposeful;Spontaneous  (04/15/17 2315)   At baseline    Mental Status and Level of Consciousness: Arousable    Pulmonary Status:   O2 Device: Other (comment) (ambu 15 liters oxygen) (04/16/17 1426)   Adequate oxygenation and airway patent    Complications related to anesthesia: None    Post-anesthesia assessment completed.  No concerns    Signed By: Connie Wooten MD     April 16, 2017

## 2017-04-16 NOTE — PROGRESS NOTES
Pt taken back to ICU at this time with anesthesiologist at bedside. No acute distress noted upon departure.

## 2017-04-16 NOTE — PROGRESS NOTES
ICU Daily Progress NOTE    Poli Hearing    4/16/2017    Date of Admission:  4/15/2017     Patient is a 68 y.o.  female seen and evaluated at the request of Dr. Stefania Chung. Patient s/p recent lap patrice 3 weeks ago by Dr. Clint Trivedi. Apparently with increased abdominal swelling and bloating soon afterwardsd. Appetite has been low and getting overall weaker. No reported N/V. Has been moving bowels. No fevers or chills reported. In ER BP is 80's. Also wBC of 22k. Given fluids and still low. CT of abdomen done and noted. Marked ascitics but no obstruction. Seen by GI and Surgery. Going for [employer]. In ER given Zosyn and fluids. Also noted to have acute renal failure and elevated LFTS with low albumin of 1.8. LA was only 1.9    Now patient is in ICU and BP is 80's after getting 30 ml/kg given in ER. No pain. No N/V. She reports appetite has been very poor. The patient's chart is reviewed and the patient is discussed with the staff. Subjective:     Patient is making urine now. Placed in central line and did not need to start pressors. Fluids helping. Still with abdominal fullness, no overt tenderness. Mouth is dry.      Review of Systems:  -Fever  -Headaches  -Chest pain  -Dyspnea, -wheezing, -cough  -Abdominal pain, +abdomina swelling, - constipation  -Leg swelling  All other organ systems grossly normal.      Current Facility-Administered Medications   Medication Dose Route Frequency    vancomycin (VANCOCIN) 1250 mg in  ml infusion  1,250 mg IntraVENous See Admin Instructions    NOREPINephrine (LEVOPHED) 4,000 mcg in dextrose 5% 250 mL infusion  2-16 mcg/min IntraVENous TITRATE    oxyCODONE-acetaminophen (PERCOCET) 5-325 mg per tablet 1 Tab  1 Tab Oral Q4H PRN    sodium chloride (NS) flush 5-10 mL  5-10 mL IntraVENous PRN    0.9% sodium chloride infusion 1,000 mL  1,000 mL IntraVENous ONCE    lactated ringers infusion  125 mL/hr IntraVENous CONTINUOUS  ondansetron (ZOFRAN) injection 4 mg  4 mg IntraVENous Q4H PRN    piperacillin-tazobactam (ZOSYN) 3.375 g in 0.9% sodium chloride (MBP/ADV) 100 mL  3.375 g IntraVENous Q12H    [START ON 4/17/2017] famotidine (PF) (PEPCID) injection 20 mg  20 mg IntraVENous DAILY    albumin human 25% (BUMINATE) solution 12.5 g  12.5 g IntraVENous Q8H    [START ON 4/20/2017] levothyroxine (SYNTHROID) injection 37.5 mcg  37.5 mcg IntraVENous Q24H         Objective:     Vitals:    04/16/17 0545 04/16/17 0600 04/16/17 0615 04/16/17 0630   BP: 92/47 93/45 91/46 (!) 95/39   Pulse:  100 99 98   Resp:  23 21 23   Temp:       SpO2:  91% 92% 91%   Weight:       Height:           PHYSICAL EXAM     Constitutional:  the patient is well developed and in no acute distress  EENMT:  Sclera clear, pupils equal, oral mucosa moist  Respiratory: decreased sounds in the based, no wheezing. Cardiovascular:  RRR without M,G,R  Gastrointestinal: full and non-tender; with no bowel sounds. Musculoskeletal: warm without cyanosis. There is no lower leg edema. Skin:  no jaundice or rashes, no bleeding wounds   Neurologic: no gross neuro deficits     Psychiatric:  alert and oriented x 3    Chest X-ray:  Yesterday noted no acute pathology. CT of abd/pelvis -- no obstruction + large amount of ascites. Recent Labs      04/16/17   0400  04/16/17   0030  04/15/17   1320   WBC  21.6*  23.1*  22.6*   HGB  8.2*  9.2*  10.8*   HCT  25.0*  27.8*  32.2*   PLT  619*  715*  896*   INR   --    --   1.2     Recent Labs      04/16/17   0400  04/16/17   0030  04/15/17   1320   NA  143  138  135*   K  4.2  4.5  4.7   CL  112*  107  101   GLU  92  100  190*   CO2  20*  21  22   BUN  56*  58*  62*   CREA  1.87*  2.45*  2.40*   CA  8.0*  8.2*  9.8   ALB  1.5*  1.4*  1.8*   TBILI  1.6*  1.7*  1.8*   ALT  29  38  48   SGOT  30  19  46*     No results for input(s): PH, PCO2, PO2, HCO3 in the last 72 hours.   No results for input(s): LCAD, LAC in the last 72 hours.    Assessment:  (Medical Decision Making)     Hospital Problems  Date Reviewed: 3/14/2017          Codes Class Noted POA    * (Principal)Sepsis ([facility] ICD-10-CM: A41.9  ICD-9-CM: 995.91  4/15/2017 Yes        Ascites ICD-10-CM: R18.8  ICD-9-CM: 789.59  4/15/2017 Yes    Large amount in abdomen    Septic shock (HCC) ICD-10-CM: A41.9, R65.21  ICD-9-CM: 785.52  4/15/2017 Unknown    --BP stable with fluids, did not need to start pressors yet    Abdominal distension ICD-10-CM: R14.0  ICD-9-CM: 787.3  4/15/2017 Yes        OMERO (acute kidney injury) ([facility] ICD-10-CM: N17.9  ICD-9-CM: 584.9  4/15/2017 Yes        Leukocytosis ICD-10-CM: B12.594  ICD-9-CM: 288.60  4/15/2017 Yes    Coming down    Hypotension ICD-10-CM: I95.9  ICD-9-CM: 458.9  4/15/2017 Yes    Coming up from 80's to 100's now    S/P laparoscopic cholecystectomy ICD-10-CM: Z90.49  ICD-9-CM: V45.89  4/15/2017 Yes    Overview Signed 4/15/2017  9:07 PM by Davion Coreas DO     3 weeks ago by Dr. Jacklyn Haney calorie [facility]) ICD-10-CM: C74  ICD-9-CM: 263.9  4/15/2017 Unknown    Overview Signed 4/15/2017 11:48 PM by Uriah Gilmore MD     With Albumin < 2 currently. Anemia  --hg coming down, but likely dilutional. No bleeding noted. Will check for occult blood    Plan:  (Medical Decision Making)     -- continue fluids, Cr improved, does not need pressors right now.  -- zosyn/vanco D2, cultures still pending but drawn yesterday. -- on a few doses of albumin   --check still for occult blood. --f/u with GI/Surgery for drainage of large volume ascitics and further recommendations. --continue remaining treatment. More than 50% of the time documented was spent in face-to-face contact with the patient and in the care of the patient on the floor/unit where the patient is located.       Uriah Gilmore MD

## 2017-04-16 NOTE — INTERVAL H&P NOTE
H&P Update:  Lito Birch was seen and examined. History and physical has been reviewed. The patient has been examined.  There have been no significant clinical changes since the completion of the originally dated History and Physical except for positive HIDA    Signed By: Corinne Cull, MD     April 16, 2017 12:53 PM

## 2017-04-16 NOTE — CONSULTS
CONSULT NOTE    Lito Birch    4/15/2017    Date of Admission:  4/15/2017    The patient's chart is reviewed and the patient is discussed with the staff. Subjective:     Patient is a 68 y.o.  female seen and evaluated at the request of Dr. Odette Snyder. Patient s/p recent lap patrice 3 weeks ago by Dr. Ilir Brennan. Apparently with increased abdominal swelling and bloating soon afterwardsd. Appetite has been low and getting overall weaker. No reported N/V. Has been moving bowels. No fevers or chills reported. In ER BP is 80's. Also wBC of 22k. Given fluids and still low. CT of abdomen done and noted. Marked ascitics but no obstruction. Seen by GI and Surgery. Going for [employer]. In ER given Zosyn and fluids. Also noted to have acute renal failure and elevated LFTS with low albumin of 1.8. LA was only 1.9    Now patient is in ICU and BP is 80's after getting 30 ml/kg given in ER. No pain. No N/V. She reports appetite has been very poor. Review of Systems:  -Fever  -Headaches  -Chest pain  -Dyspnea, -wheezing, -cough  -Abdominal pain, +abdomina swelling, - constipation  -Leg swelling  All other organ systems grossly normal.    Patient Active Problem List   Diagnosis Code    DM type 2 (diabetes mellitus, type 2) ([facility] E11.9    Hypercholesterolemia E78.00    Benign essential hypertension I10    Hypothyroidism, acquired E03.9    Sepsis ([facility] A41.9    Ascites R18.8    Abdominal distension R14.0    Hypotension I95.9    S/P laparoscopic cholecystectomy Z90.49    OMERO (acute kidney injury) ([facility] N17.9    Leukocytosis D72.829    Septic shock (HCC) A41.9, R65.21    Protein calorie malnutrition ([facility] E46         Prior to Admission Medications   Prescriptions Last Dose Informant Patient Reported? Taking? DOCOSAHEXANOIC ACID/EPA (FISH OIL PO)   Yes No   Sig: Take 1 Tab by mouth daily.    benazepril-hydroCHLOROthiazide (LOTENSIN HCT) 20-12.5 mg per tablet   No No Sig: Take 1 Tab by mouth daily. cholecalciferol (VITAMIN D3) 1,000 unit tablet   Yes No   Sig: Take  by mouth daily. fenofibrate (LOFIBRA) 160 mg tablet   No No   Sig: TAKE 1 TABLET BY MOUTH DAILY   folic acid 849 mcg tablet   Yes No   Sig: Take 800 mcg by mouth daily. levothyroxine (SYNTHROID) 75 mcg tablet   No No   Sig: Take 1 Tab by mouth Daily (before breakfast). metFORMIN (GLUCOPHAGE) 500 mg tablet   Yes No   Sig: Take 500 mg by mouth daily. oxyCODONE-acetaminophen (PERCOCET) 5-325 mg per tablet   Yes No   Si Tab every eight (8) hours as needed. raNITIdine (ZANTAC) 150 mg tablet   No No   Sig: Take 1 Tab by mouth two (2) times a day. raloxifene (EVISTA) 60 mg tablet   No No   Sig: Take 1 Tab by mouth daily. rosuvastatin (CRESTOR) 10 mg tablet   No No   Sig: Take 1 Tab by mouth nightly. Facility-Administered Medications: None       Past Medical History:   Diagnosis Date    Benign essential hypertension     managed well with meds    Breast cancer ([facility] 20 years ago    Left breast- treated with Chemo and radiation    DM type 2 (diabetes mellitus, type 2) (HCC)     oral agent only/AVG BS: 106/no s.s of of hypoglycemia/Last A1c 6.5    Former cigarette smoker     Gout     hx of - no recent episodes    Hypercholesterolemia     controlled well with meds    Hypothyroidism, acquired     managed well with Synthroid     Past Surgical History:   Procedure Laterality Date    HX BREAST LUMPECTOMY Left     HX CHOLECYSTECTOMY  2017    HX COLONOSCOPY      HX WISDOM TEETH EXTRACTION       Social History     Social History    Marital status:      Spouse name: N/A    Number of children: N/A    Years of education: N/A     Occupational History    Not on file.      Social History Main Topics    Smoking status: Former Smoker     Packs/day: 1.00     Years: 20.00     Types: Cigarettes     Quit date: 1983    Smokeless tobacco: Never Used    Alcohol use 0.0 oz/week     0 Standard drinks or equivalent per week      Comment: socially    Drug use: No    Sexual activity: Not on file     Other Topics Concern    Not on file     Social History Narrative    Lives at home with , Cata Guzmán     Family History   Problem Relation Age of Onset    Cancer Mother      Breast CA    Heart Disease Mother     Diabetes Father    Sue Merlin Father      M. I     Allergies   Allergen Reactions    Ciprofloxacin Other (comments)     Complains of pain in abd    Lipitor [Atorvastatin] Palpitations    Ultram [Tramadol] Nausea Only       Current Facility-Administered Medications   Medication Dose Route Frequency    [START ON 4/16/2017] levothyroxine (SYNTHROID) tablet 75 mcg  75 mcg Oral ACB    oxyCODONE-acetaminophen (PERCOCET) 5-325 mg per tablet 1 Tab  1 Tab Oral Q4H PRN    sodium chloride (NS) flush 5-10 mL  5-10 mL IntraVENous PRN    sodium chloride 0.9 % bolus infusion 1,947 mL  30 mL/kg IntraVENous ONCE    lactated ringers infusion  150 mL/hr IntraVENous CONTINUOUS    ondansetron (ZOFRAN) injection 4 mg  4 mg IntraVENous Q4H PRN    vancomycin (VANCOCIN) 1250 mg in  ml infusion  1,250 mg IntraVENous ONCE    [START ON 4/16/2017] piperacillin-tazobactam (ZOSYN) 3.375 g in 0.9% sodium chloride (MBP/ADV) 100 mL  3.375 g IntraVENous Q12H    [START ON 4/17/2017] famotidine (PF) (PEPCID) injection 20 mg  20 mg IntraVENous DAILY         Objective:     Vitals:    04/15/17 1741 04/15/17 1742 04/15/17 1743 04/15/17 2202   BP: (!) 76/50  (!) 85/53 (!) 84/49   Pulse:  97 (!) 101 94   Resp:  23 22    Temp:       SpO2:  95% 96% 94%   Weight:       Height:           PHYSICAL EXAM     Constitutional:  the patient is well developed and in no acute distress  EENMT:  Sclera clear, pupils equal, oral mucosa moist  Respiratory: CTA b/l No wheezing  Cardiovascular:  RRR without M,G,R  Gastrointestinal: full and non-tender; with scant bowel sounds. Musculoskeletal: warm without cyanosis.  There is no lower leg edema.  Skin:  no jaundice or rashes, no bleeding wounds   Neurologic: no gross neuro deficits     Psychiatric:  alert and oriented x 3    Chest X-ray:  No acute pathology noted, lung clear    CT of abd/pelvis -- no obstruction + large amount of ascites. Recent Labs      04/15/17   1320   WBC  22.6*   HGB  10.8*   HCT  32.2*   PLT  896*   INR  1.2     Recent Labs      04/15/17   1320   NA  135*   K  4.7   CL  101   GLU  190*   CO2  22   BUN  62*   CREA  2.40*   CA  9.8   ALB  1.8*   TBILI  1.8*   ALT  48   SGOT  46*     No results for input(s): PH, PCO2, PO2, HCO3 in the last 72 hours. No results for input(s): LCAD, LAC in the last 72 hours. Assessment:  (Medical Decision Making)     Hospital Problems  Date Reviewed: 3/14/2017          Codes Class Noted POA    * (Principal)Sepsis ([facility] ICD-10-CM: A41.9  ICD-9-CM: 995.91  4/15/2017 Yes        Ascites ICD-10-CM: R18.8  ICD-9-CM: 789.59  4/15/2017 Yes        Septic shock ([facility] ICD-10-CM: A41.9, R65.21  ICD-9-CM: 785.52  4/15/2017 Unknown        Abdominal distension ICD-10-CM: R14.0  ICD-9-CM: 787.3  4/15/2017 Yes        OMERO (acute kidney injury) ([facility] ICD-10-CM: N17.9  ICD-9-CM: 584.9  4/15/2017 Yes        Leukocytosis ICD-10-CM: V18.393  ICD-9-CM: 288.60  4/15/2017 Yes        Hypotension ICD-10-CM: I95.9  ICD-9-CM: 458.9  4/15/2017 Yes        S/P laparoscopic cholecystectomy ICD-10-CM: Z90.49  ICD-9-CM: V45.89  4/15/2017 Yes    Overview Signed 4/15/2017  9:07 PM by Miguel Monday, DO     3 weeks ago by Dr. Fritz Friday calorie [facility]) ICD-10-CM: E00  ICD-9-CM: 263.9  4/15/2017 Unknown    Overview Signed 4/15/2017 11:48 PM by Gamal Collins MD     With Albumin < 2 currently. Plan:  (Medical Decision Making)     -- just placed in central line  -- continue fluids  -- place in ballesteros  -- add pressors if needed  -- zosyn/vanco for now. -- panculture  -- will give few doses of albumin since low currently.    --f/u with GI/Surgery for drainage of ascitics and further recommendations. --continue remaining treatment. More than 50% of the time documented was spent in face-to-face contact with the patient and in the care of the patient on the floor/unit where the patient is located. Thank you very much for this referral.  We appreciate the opportunity to participate in this patient's care. Will follow along with above stated plan.     Patrizia Hardy MD

## 2017-04-16 NOTE — PROCEDURES
ERCP: Endoscopic Retrograde Cholangiopancreatogram    DATE of PROCEDURE: 4/16/2017    MEDICATIONS ADMINISTERED:    1. General anesthesia    INSTRUMENT: TJF    PROCEDURE:  After obtaining informed consent, the patient was placed in prone position on the fluoroscopy table. The patient was then sedated. The endoscope was passed under indirect technique to the oropharynx and gently passed into the esophagus. The endoscope was passed to the ampulla. Only partial views of the stomach and duodenum were obtained. After the procedure, the patient was taken to the recovery area in stable condition. FINDINGS:  AMPULLA: normal appearance    BILE DUCT: cannulated with moderate difficulty using a 44 sphincterotome over a 0.035 wire. Initial cholangiogram revealed normal caliber duct with debris and a cystic duct leak. Moderate sphincterotomy was performed along the long axis of the intraduodenal papilla, taking care to avoid the pancreatic orifice. Balloon sweep with a 12 mm balloon removed multiple small stones and stone debris. A 5 cm 10 Bangladeshi stent was then placed in the bile duct. PANCREATIC DUCT: normal appearing head    ASSESSMENT/PLAN:  1. EGD with stent removal in 8-10 weeks  2.  Advance diet as tolerated      Yeyo Porter MD  [facility] [place]

## 2017-04-16 NOTE — PROGRESS NOTES
Patient seen and examined. Endoscopy and risks explained to the patient,  and son. Risks including reaction to sedation, pancreatitis, cardiopulmonary events, infection, bleeding, perforation, requirement for surgery or IR if complications should occur, death were explained to patient who expressed understanding and agreed to proceed with endoscopy.     Anai Whitaker MD  [facility] [place]

## 2017-04-16 NOTE — PROGRESS NOTES
[facility]  [address] [place] [address], [address] [place] [address]  [phone]    Office Note/H&P/Consult Note/Progress Note   Francis Lopez   MRN: [id number]     : 1940        Original General Surgery Consult ordered by: Dr. Cindy Lyles  Reason for Consult: Abd distention - pt 3 wks post cholecystectomy    HPI: Francis Lopez is a 68 y.o.  female who presented to the ED 4/15/17   with c/o fatigue, decreased appetite, and abdominal distention. She is s/p lap patrice on 3/24/17 with Dr. Saint Clair. Path showed:   DIAGNOSIS  GALLBLADDER:  CHRONIC CHOLECYSTITIS. Electronically signed out on 3/27/2017 08:26 by Tio Snell MD   Microscopic Description   Sections of gallbladder show scattered Rokitansky-Aschoff sinuses and chronic inflammatory cells. No dysplasia is seen. Dr. Corey Mazariegos concurs. She saw Dr. Saint Clair post-op last week. Pt states the symptoms started shortly after surgery and have worsened. She reports many years of daily alcohol use up until about 1 month ago. She denies fever, chills, CP, or emesis. BMs have been normal. Last BM today. 4/15/17 CT abd/pelvis with oral but no IV contrast  CT Abdomen:  2 upper images show mild atelectatic change at the right lung base. There is a hiatal hernia. Spleen is unremarkable. Liver has a lobular contour and is somewhat small. No focal mass. Gallbladder has been removed. Pancreas is unremarkable. Adrenals are normal. Kidneys are normal in size. There is a 1.8 cm cyst mid right kidney. 1  mm nonobstructing stone midpole calyx left kidney. No hydronephrosis. The abdominal aorta is normal in size. Atherosclerotic calcification is present. No adenopathy. There is a large amount of ascites in the abdomen.     CT Pelvis:  Large amount of ascites is present. There is a small calcified fibroid in the uterus. No pelvic adenopathy.   No evidence of bowel obstruction.      Impression:   Large amount of ascites in the abdomen and pelvis     4/15/17 CXR   IMPRESSION: No acute disease.      4/16/17 HIDA  INDICATION: Postcholecystectomy, abdominal pain and ascites, possible bile leak    FINDINGS: There is prompt uptake in the liver and prompt excretion of activity  into the tissues around the liver consistent with a bile leak.  No uptake is  ever seen in the common duct or small bowel.     IMPRESSION: The findings are consistent with a bile leak.         Past Medical History:   Diagnosis Date    Benign essential hypertension     managed well with meds    Breast cancer ([facility] 20 years ago    Left breast- treated with Chemo and radiation    DM type 2 (diabetes mellitus, type 2) ([facility]     oral agent only/AVG BS: 106/no s.s of of hypoglycemia/Last A1c 6.5    Former cigarette smoker     Gout     hx of - no recent episodes    Hypercholesterolemia     controlled well with meds    Hypothyroidism, acquired     managed well with Synthroid     Past Surgical History:   Procedure Laterality Date    HX BREAST LUMPECTOMY Left     HX CHOLECYSTECTOMY  2017    HX COLONOSCOPY      HX WISDOM TEETH EXTRACTION       Current Facility-Administered Medications   Medication Dose Route Frequency    vancomycin (VANCOCIN) 1250 mg in  ml infusion  1,250 mg IntraVENous See Admin Instructions    NOREPINephrine (LEVOPHED) 4,000 mcg in dextrose 5% 250 mL infusion  2-16 mcg/min IntraVENous TITRATE    fentaNYL in normal saline (pf) 25 mcg/mL infusion  0.5-1.5 mcg/kg/hr IntraVENous TITRATE    oxyCODONE-acetaminophen (PERCOCET) 5-325 mg per tablet 1 Tab  1 Tab Oral Q4H PRN    sodium chloride (NS) flush 5-10 mL  5-10 mL IntraVENous PRN    lactated ringers infusion  125 mL/hr IntraVENous CONTINUOUS    ondansetron (ZOFRAN) injection 4 mg  4 mg IntraVENous Q4H PRN    piperacillin-tazobactam (ZOSYN) 3.375 g in 0.9% sodium chloride (MBP/ADV) 100 mL  3.375 g IntraVENous Q12H    [START ON 4/17/2017] famotidine (PF) (PEPCID) injection 20 mg  20 mg IntraVENous DAILY    albumin human 25% (BUMINATE) solution 12.5 g  12.5 g IntraVENous Q8H    [START ON 4/20/2017] levothyroxine (SYNTHROID) injection 37.5 mcg  37.5 mcg IntraVENous Q24H     ALLERGIES:  Ciprofloxacin; Lipitor [atorvastatin]; and Ultram [tramadol]    Social History     Social History    Marital status:      Spouse name: N/A    Number of children: N/A    Years of education: N/A     Social History Main Topics    Smoking status: Former Smoker     Packs/day: 1.00     Years: 20.00     Types: Cigarettes     Quit date: 1/1/1983    Smokeless tobacco: Never Used    Alcohol use 0.0 oz/week     0 Standard drinks or equivalent per week      Comment: socially    Drug use: No    Sexual activity: Not Asked     Other Topics Concern    None     Social History Narrative    Lives at home with , Brinda Obrien     History   Smoking Status    Former Smoker    Packs/day: 1.00    Years: 20.00    Types: Cigarettes    Quit date: 1/1/1983   Smokeless Tobacco    Never Used     Family History   Problem Relation Age of Onset    Cancer Mother      Breast CA    Heart Disease Mother     Diabetes Father     Heart Attack Father      M.I       ROS: The patient has no difficulty with chest pain or shortness of breath. No fever or chills. Comprehensive review of systems was otherwise unremarkable except as noted above. Physical Exam:   Constitutional: Alert, cooperative, no acute distress. Visit Vitals    /53    Pulse (!) 107    Temp 97.6 °F (36.4 °C)    Resp 18    Ht 5' 5\" (1.651 m)    Wt 151 lb 3.8 oz (68.6 kg)    SpO2 95%    Breastfeeding No    BMI 25.17 kg/m2     Eyes:Sclera are clear. ENMT: no obvious neck masses, no ear or lip lesions  CV: RRR. Normal perfusion  Resp: No JVD. Breathing is  non-labored. GI: tightly distended; minimal generalized ttp; no guarding or rebound BS active     Musculoskeletal: unremarkable with normal function.    Neuro:  AOx3, No obvious focal deficits  Psychiatric: normal affect and mood, no memory impairment      Labs:  Lab Results   Component Value Date/Time    WBC 21.6 04/16/2017 04:00 AM    HGB 8.2 04/16/2017 04:00 AM    PLATELET 789 08/85/5016 04:00 AM    Sodium 143 04/16/2017 04:00 AM    Potassium 4.2 04/16/2017 04:00 AM    Chloride 112 04/16/2017 04:00 AM    CO2 20 04/16/2017 04:00 AM    BUN 56 04/16/2017 04:00 AM    Creatinine 1.87 04/16/2017 04:00 AM    Glucose 92 04/16/2017 04:00 AM    INR 1.2 04/15/2017 01:20 PM    Bilirubin, total 1.6 04/16/2017 04:00 AM    Bilirubin, direct 1.0 04/15/2017 01:20 PM    AST (SGOT) 30 04/16/2017 04:00 AM    ALT (SGPT) 29 04/16/2017 04:00 AM    Alk. phosphatase 146 04/16/2017 04:00 AM    Amylase 86 03/14/2017 11:51 AM    Lipase 284 04/15/2017 01:20 PM         Assessment/Plan: Yuki Ballard is a 68 y.o. female who has signs and symptoms consistent with the below issues:  Problem List  Date Reviewed: 4/16/2017          Codes Class Noted    Anemia ICD-10-CM: D64.9  ICD-9-CM: 285.9  4/16/2017        * (Principal)Sepsis ([facility] ICD-10-CM: A41.9  ICD-9-CM: 995.91  4/15/2017        Ascites ICD-10-CM: R18.8  ICD-9-CM: 789.59  4/15/2017        Abdominal distension ICD-10-CM: R14.0  ICD-9-CM: 787.3  4/15/2017        Hypotension ICD-10-CM: I95.9  ICD-9-CM: 458.9  4/15/2017        S/P laparoscopic cholecystectomy ICD-10-CM: Z90.49  ICD-9-CM: V45.89  4/15/2017    Overview Signed 4/15/2017  9:07 PM by Vance Leonardo DO     3 weeks ago by Dr. Prashant Barth             OMERO (acute kidney injury) [facility]) ICD-10-CM: N17.9  ICD-9-CM: 584.9  4/15/2017        Leukocytosis ICD-10-CM: W90.430  ICD-9-CM: 288.60  4/15/2017        Septic shock ([facility] ICD-10-CM: A41.9, R65.21  ICD-9-CM: 785.52  4/15/2017        Protein calorie malnutrition ([facility] ICD-10-CM: E46  ICD-9-CM: 263.9  4/15/2017    Overview Signed 4/15/2017 11:48 PM by Padmini Lockhart MD     With Albumin < 2 currently.               DM type 2 (diabetes mellitus, type 2) ([facility] ICD-10-CM: E11.9  ICD-9-CM: 250.00  Unknown        Hypercholesterolemia ICD-10-CM: E78.00  ICD-9-CM: 272.0  Unknown        Benign essential hypertension ICD-10-CM: I10  ICD-9-CM: 401.1  Unknown        Hypothyroidism, acquired ICD-10-CM: E03.9  ICD-9-CM: 244.9  Unknown            Bile leak seen this am on HIDA scan  GI has placed ERCP stent  Consult IR for peritoneal drainage  Hydrate aggressively for acute renal failure  Follow T Bili and LFTs  Continue IV Abx  Tx to surgery service  Will notify Dr Saint Clair in am.

## 2017-04-16 NOTE — PROGRESS NOTES
Patient seen post ERCP. Intubated. BP marginal . Awake and asking for tube to be removed. Abdomen extremely tight and distended. WIll give 500cc bolus LR and see how BP looks. If MAP >65 will try 5/5 SBT and check abg to see if ready for extubation.      Zain Newman MD

## 2017-04-16 NOTE — PROGRESS NOTES
Ventilator check complete; patient has a #7. 0 ET tube secured at the 22 at the lip. Patient is not sedated. Patient is not able to follow commands. Breath sounds are diminished. Trachea is midline, Negative for subcutaneous air, and chest excursion is symmetric. Patient is also Negative for cyanosis and is Negative for pitting edema. All alarms are set and audible. Resuscitation bag is at the head of the bed.       Ventilator Settings  Mode FIO2 Rate Tidal Volume Pressure PEEP I:E Ratio   Pressure support  40 %       10 cm H2O  8 cm H20         Peak airway pressure: 19.3 cm H2O   Minute ventilation: 11.2 l/min     ABG:   Recent Labs      04/16/17   1510   PH  7.37   PCO2  28*   PO2  96   HCO3  16*         Jordin Wattsp, RT

## 2017-04-16 NOTE — PROGRESS NOTES
Patient received in CCU. Patient alert/oriented, denies any acute distress. Dual skin assessment with ANIYA Blevins. Patient with well healing surgical scars to ABD. 1 to belly button, 4 well healing lap scars across ABD. Patient with small healing abrasion to L shin. Patient with some blanchable redness to sacrum. + allyvn. Patient oriented to call light.

## 2017-04-16 NOTE — PROGRESS NOTES
Pharmacokinetic Consult to Pharmacist    Suleman Latham is a 68 y.o. female being treated for Sepsis  with Vancomycin   And Zosyn. Has ascites - S/P Laparotomy Cholecystectomy about 3 weeks ago. Height: 5' 5\" (165.1 cm)  Weight: 68.6 kg (151 lb 3.8 oz)  Lab Results   Component Value Date/Time    BUN 58 04/16/2017 12:30 AM    Creatinine 2.45 04/16/2017 12:30 AM    WBC 23.1 04/16/2017 12:30 AM    Procalcitonin 1.1 04/16/2017 12:30 AM      Estimated Creatinine Clearance: 19 mL/min (based on Cr of 2.45). CULTURES:  MRSA screen - in process  Blood - in process    Day 1 of vancomycin. Goal trough is 15 - 20 . Vancomycin dose initiated at 1250 mg IVPB   X one dose. Further dosing will depend upon random levels. Clinical staff will   Continue to follow patient.         Thank you,  Yves Kern, PHARMD

## 2017-04-16 NOTE — PROGRESS NOTES
Patient continues to suffer with some hypotension issues. Spoke with MD Merari Prieto. Order for 1 additional liter of NS. If MAP < 65, start levo to target MAP >= 65.

## 2017-04-16 NOTE — PROGRESS NOTES
Received call that patient will be delayed coming to the floor as they are heading to [address] first.

## 2017-04-16 NOTE — PROGRESS NOTES
GI-see dictated consult note and orders. Will check a HIDA (r/o bile leak) in the AM.  ERCP, paracentesis, etc. May be necessary tomorrow depending on HIDA results and clinical course. Recommend antibiotic coverage given leukocytosis and history of diabetes.   Thanks Maria Luz Swain MD

## 2017-04-16 NOTE — PROGRESS NOTES
Transfer Service Note    HIDA showed bile leak. ERCP with stent. IR consulted for peritoneal drain. Surgery has agreed to take over as primary service. We will sign off. Please do not hesitate to consult or call us for any medical needs.     Zoë Rae, DO

## 2017-04-16 NOTE — PROCEDURES
Indication:    Time Out done and Correct patient for procedure. Using Ultrasound Large right Internal Jugular Vein Located. Area prepped and sterilized with chlorhexedine. Sterile drapes applied. Sterile sheath place on ultrasound probe. Patient given local lidocane for anesthesia. Using Seldinger Technique with realtime ultrasound guidance  QUAD  Lumen Catheter inserted. Guidewire removed. All ports with blood return and lines flushed. Line Sutured in Yogesh De Mars. Patient tolerated procedure well, no complications. CXR ordered. Estimated Blood loss: 2 ml       Mora De La Garza MD    Electronically signed.

## 2017-04-16 NOTE — PROGRESS NOTES
BP has slightly rebounded, now about MAP 65. Holding off on levo for now. CVP monitoring in place at distal port of quad lumen (without cap). Patient largely restful with eyes closed, even non-labored respirations. Briefly awoken for check and denies any needs at current.

## 2017-04-16 NOTE — ANESTHESIA PREPROCEDURE EVALUATION
Anesthetic History   No history of anesthetic complications            Review of Systems / Medical History  Patient summary reviewed and pertinent labs reviewed    Pulmonary  Within defined limits            Pertinent negatives: Smoker: ex.     Neuro/Psych   Within defined limits           Cardiovascular    Hypertension: well controlled          Hyperlipidemia    Exercise tolerance: >4 METS  Comments: ST, cannot r/o inf MI   GI/Hepatic/Renal  Within defined limits              Endo/Other    Diabetes: well controlled, type 2  Hypothyroidism: well controlled  Cancer (breast) and anemia     Other Findings   Comments: Gout  Septic shock         Physical Exam    Airway  Mallampati: I  TM Distance: 4 - 6 cm  Neck ROM: normal range of motion   Mouth opening: Normal     Cardiovascular    Rhythm: regular  Rate: normal         Dental         Pulmonary  Breath sounds clear to auscultation               Abdominal  GI exam deferred       Other Findings            Anesthetic Plan    ASA: 2, emergent  Anesthesia type: general          Induction: Intravenous  Anesthetic plan and risks discussed with: Patient

## 2017-04-17 LAB
ALBUMIN SERPL BCP-MCNC: 1.7 G/DL (ref 3.2–4.6)
ALBUMIN/GLOB SERPL: 0.5 {RATIO} (ref 1.2–3.5)
ALP SERPL-CCNC: 112 U/L (ref 50–136)
ALT SERPL-CCNC: 31 U/L (ref 12–65)
ANION GAP BLD CALC-SCNC: 12 MMOL/L (ref 7–16)
AST SERPL W P-5'-P-CCNC: 43 U/L (ref 15–37)
BASOPHILS # BLD AUTO: 0 K/UL (ref 0–0.2)
BASOPHILS # BLD: 0 % (ref 0–2)
BILIRUB DIRECT SERPL-MCNC: 0.8 MG/DL
BILIRUB SERPL-MCNC: 1.2 MG/DL (ref 0.2–1.1)
BUN SERPL-MCNC: 44 MG/DL (ref 8–23)
CALCIUM SERPL-MCNC: 8 MG/DL (ref 8.3–10.4)
CHLORIDE SERPL-SCNC: 110 MMOL/L (ref 98–107)
CO2 SERPL-SCNC: 20 MMOL/L (ref 21–32)
CREAT SERPL-MCNC: 1.75 MG/DL (ref 0.6–1)
DIFFERENTIAL METHOD BLD: ABNORMAL
EOSINOPHIL # BLD: 0.2 K/UL (ref 0–0.8)
EOSINOPHIL NFR BLD: 1 % (ref 0.5–7.8)
ERYTHROCYTE [DISTWIDTH] IN BLOOD BY AUTOMATED COUNT: 13.8 % (ref 11.9–14.6)
GLOBULIN SER CALC-MCNC: 3.6 G/DL (ref 2.3–3.5)
GLUCOSE SERPL-MCNC: 103 MG/DL (ref 65–100)
HCT VFR BLD AUTO: 25.5 % (ref 35.8–46.3)
HGB BLD-MCNC: 8.3 G/DL (ref 11.7–15.4)
IMM GRANULOCYTES # BLD: 0.1 K/UL (ref 0–0.5)
IMM GRANULOCYTES NFR BLD AUTO: 0.3 % (ref 0–5)
LYMPHOCYTES # BLD AUTO: 5 % (ref 13–44)
LYMPHOCYTES # BLD: 0.8 K/UL (ref 0.5–4.6)
MAGNESIUM SERPL-MCNC: 1.7 MG/DL (ref 1.8–2.4)
MCH RBC QN AUTO: 26.8 PG (ref 26.1–32.9)
MCHC RBC AUTO-ENTMCNC: 32.5 G/DL (ref 31.4–35)
MCV RBC AUTO: 82.3 FL (ref 79.6–97.8)
MONOCYTES # BLD: 0.5 K/UL (ref 0.1–1.3)
MONOCYTES NFR BLD AUTO: 3 % (ref 4–12)
NEUTS SEG # BLD: 14.2 K/UL (ref 1.7–8.2)
NEUTS SEG NFR BLD AUTO: 91 % (ref 43–78)
PHOSPHATE SERPL-MCNC: 4.1 MG/DL (ref 2.3–3.7)
PLATELET # BLD AUTO: 603 K/UL (ref 150–450)
PMV BLD AUTO: 9.4 FL (ref 10.8–14.1)
POTASSIUM SERPL-SCNC: 3.8 MMOL/L (ref 3.5–5.1)
PROT SERPL-MCNC: 5.3 G/DL (ref 6.3–8.2)
RBC # BLD AUTO: 3.1 M/UL (ref 4.05–5.25)
SODIUM SERPL-SCNC: 142 MMOL/L (ref 136–145)
WBC # BLD AUTO: 15.8 K/UL (ref 4.3–11.1)

## 2017-04-17 PROCEDURE — 84100 ASSAY OF PHOSPHORUS: CPT | Performed by: INTERNAL MEDICINE

## 2017-04-17 PROCEDURE — 85025 COMPLETE CBC W/AUTO DIFF WBC: CPT | Performed by: INTERNAL MEDICINE

## 2017-04-17 PROCEDURE — 74011250636 HC RX REV CODE- 250/636: Performed by: INTERNAL MEDICINE

## 2017-04-17 PROCEDURE — P9047 ALBUMIN (HUMAN), 25%, 50ML: HCPCS | Performed by: INTERNAL MEDICINE

## 2017-04-17 PROCEDURE — 65620000000 HC RM CCU GENERAL

## 2017-04-17 PROCEDURE — 74011250637 HC RX REV CODE- 250/637: Performed by: INTERNAL MEDICINE

## 2017-04-17 PROCEDURE — 99232 SBSQ HOSP IP/OBS MODERATE 35: CPT | Performed by: INTERNAL MEDICINE

## 2017-04-17 PROCEDURE — 74011250636 HC RX REV CODE- 250/636: Performed by: NURSE PRACTITIONER

## 2017-04-17 PROCEDURE — 80076 HEPATIC FUNCTION PANEL: CPT | Performed by: INTERNAL MEDICINE

## 2017-04-17 PROCEDURE — 74011000250 HC RX REV CODE- 250: Performed by: INTERNAL MEDICINE

## 2017-04-17 PROCEDURE — 74011000258 HC RX REV CODE- 258: Performed by: INTERNAL MEDICINE

## 2017-04-17 PROCEDURE — 83735 ASSAY OF MAGNESIUM: CPT | Performed by: INTERNAL MEDICINE

## 2017-04-17 PROCEDURE — 74011000250 HC RX REV CODE- 250: Performed by: SURGERY

## 2017-04-17 PROCEDURE — 80048 BASIC METABOLIC PNL TOTAL CA: CPT | Performed by: INTERNAL MEDICINE

## 2017-04-17 PROCEDURE — 77010033678 HC OXYGEN DAILY

## 2017-04-17 RX ORDER — MAGNESIUM SULFATE 1 G/100ML
1 INJECTION INTRAVENOUS ONCE
Status: COMPLETED | OUTPATIENT
Start: 2017-04-17 | End: 2017-04-18

## 2017-04-17 RX ORDER — POTASSIUM CHLORIDE 14.9 MG/ML
20 INJECTION INTRAVENOUS ONCE
Status: COMPLETED | OUTPATIENT
Start: 2017-04-17 | End: 2017-04-18

## 2017-04-17 RX ADMIN — OXYCODONE HYDROCHLORIDE AND ACETAMINOPHEN 1 TABLET: 5; 325 TABLET ORAL at 03:24

## 2017-04-17 RX ADMIN — ALBUMIN (HUMAN) 12.5 G: 0.25 INJECTION, SOLUTION INTRAVENOUS at 15:55

## 2017-04-17 RX ADMIN — MAGNESIUM SULFATE HEPTAHYDRATE 1 G: 1 INJECTION, SOLUTION INTRAVENOUS at 11:28

## 2017-04-17 RX ADMIN — FAMOTIDINE 20 MG: 10 INJECTION, SOLUTION INTRAVENOUS at 08:21

## 2017-04-17 RX ADMIN — Medication 4 MCG/MIN: at 00:01

## 2017-04-17 RX ADMIN — OXYCODONE HYDROCHLORIDE AND ACETAMINOPHEN 1 TABLET: 5; 325 TABLET ORAL at 23:02

## 2017-04-17 RX ADMIN — POTASSIUM CHLORIDE 20 MEQ: 14.9 INJECTION, SOLUTION INTRAVENOUS at 11:28

## 2017-04-17 RX ADMIN — NOREPINEPHRINE BITARTRATE 4 MCG/MIN: 1 INJECTION INTRAVENOUS at 08:26

## 2017-04-17 RX ADMIN — Medication 7 MCG/MIN: at 05:54

## 2017-04-17 RX ADMIN — Medication 7 MCG/MIN: at 02:19

## 2017-04-17 RX ADMIN — SODIUM CHLORIDE, SODIUM LACTATE, POTASSIUM CHLORIDE, AND CALCIUM CHLORIDE 125 ML/HR: 600; 310; 30; 20 INJECTION, SOLUTION INTRAVENOUS at 08:02

## 2017-04-17 RX ADMIN — Medication 5 MCG/MIN: at 00:49

## 2017-04-17 RX ADMIN — SODIUM CHLORIDE, SODIUM LACTATE, POTASSIUM CHLORIDE, AND CALCIUM CHLORIDE 125 ML/HR: 600; 310; 30; 20 INJECTION, SOLUTION INTRAVENOUS at 00:08

## 2017-04-17 RX ADMIN — PIPERACILLIN SODIUM,TAZOBACTAM SODIUM 3.38 G: 3; .375 INJECTION, POWDER, FOR SOLUTION INTRAVENOUS at 08:21

## 2017-04-17 RX ADMIN — OXYCODONE HYDROCHLORIDE AND ACETAMINOPHEN 1 TABLET: 5; 325 TABLET ORAL at 00:14

## 2017-04-17 RX ADMIN — SODIUM CHLORIDE, SODIUM LACTATE, POTASSIUM CHLORIDE, AND CALCIUM CHLORIDE 125 ML/HR: 600; 310; 30; 20 INJECTION, SOLUTION INTRAVENOUS at 17:35

## 2017-04-17 RX ADMIN — Medication 6 MCG/MIN: at 01:06

## 2017-04-17 RX ADMIN — Medication 7 MCG/MIN: at 03:59

## 2017-04-17 RX ADMIN — ALBUMIN (HUMAN) 12.5 G: 0.25 INJECTION, SOLUTION INTRAVENOUS at 08:21

## 2017-04-17 RX ADMIN — VANCOMYCIN HYDROCHLORIDE 1000 MG: 1 INJECTION, POWDER, LYOPHILIZED, FOR SOLUTION INTRAVENOUS at 00:09

## 2017-04-17 RX ADMIN — Medication 8 MCG/MIN: at 02:38

## 2017-04-17 RX ADMIN — Medication 6 MCG/MIN: at 06:34

## 2017-04-17 RX ADMIN — PIPERACILLIN SODIUM,TAZOBACTAM SODIUM 3.38 G: 3; .375 INJECTION, POWDER, FOR SOLUTION INTRAVENOUS at 23:02

## 2017-04-17 RX ADMIN — Medication 8 MCG/MIN: at 04:51

## 2017-04-17 RX ADMIN — PIPERACILLIN SODIUM,TAZOBACTAM SODIUM 3.38 G: 3; .375 INJECTION, POWDER, FOR SOLUTION INTRAVENOUS at 15:55

## 2017-04-17 NOTE — PROGRESS NOTES
Patient medicated with PO Rx for pain, again to restful. Slow trend in BP downwards, responding with slow increase in levo.

## 2017-04-17 NOTE — PROGRESS NOTES
Patient extubated by RT per Ashley's order. Patient tolerated well. Extubated to NC, holding sat in high 90s. Patient alert/orietned though lethargic. Patient oriented to call light. No further needs expressed at this time.

## 2017-04-17 NOTE — PROGRESS NOTES
Bedside, Verbal and Written shift change report given to ANIYA Quintero (oncoming nurse) by Sterling Ruelas (offgoing nurse). Report included the following information SBAR, Kardex, ED Summary, Procedure Summary, Intake/Output, MAR, Recent Results and Cardiac Rhythm NSR/S. Tach. We discussed patient's UOP as well as output from drains. As noted in chart, large amount of output from L side drain initially with increasing output from R side drain over past several hours. Dressings 10-4, dry and in place. We also discussed patient's NPO status with oncrachel RN to seek diet with physicians this AM.  Further, we discussed patient's hypotension overnight with frequent changes in levo rate required to keep MAP = 65. Patient fully alert/oriented at time of shift change, no further needs apparent. Care assumed by oncrachel RN at this time.

## 2017-04-17 NOTE — PROGRESS NOTES
Critical Care Daily Progress Note: 4/17/2017    Court Lynne   Admission Date: 4/15/2017         The patient's chart is reviewed and the patient is discussed with the staff. 68 y.o.  female seen and evaluated at the request of Dr. Lanre Domingo. Patient s/p recent lap patrice 3 weeks ago by Dr. Heidi Aguirre. Apparently with increased abdominal swelling and bloating soon afterwards. Appetite has been low and getting overall weaker. No reported N/V. Has been moving bowels. No fevers or chills reported. In ER BP is 80's. Also WBC of 22k. Given fluids and still low. CT of abdomen done and noted marked ascitics but no obstruction. Seen by GI and Surgery. Going for [employer]. In ER given Zosyn and fluids. Also noted to have acute renal failure and elevated LFTS with low albumin of 1.8. LA was only 1.9  4/16 ERCP with cystic duck leak- stent placed,  and common bile duct stone noted. Needs EGD with stent removal in 8-10 weeks. Post ERCP, bp marginal. Required levophed   IR consulted for peritoneal drainage and drains were placed. Subjective:     Awake and alert, O2 sat 96% on 1L NC  Productive cough but swallowed. Son at bedside.  Some mild abdominal pain noted     Current Facility-Administered Medications   Medication Dose Route Frequency    potassium chloride 20 mEq in 100 ml IVPB  20 mEq IntraVENous ONCE    NOREPINephrine (LEVOPHED) 4,000 mcg in dextrose 5% 250 mL infusion  2-16 mcg/min IntraVENous TITRATE    piperacillin-tazobactam (ZOSYN) 3.375 g in 0.9% sodium chloride (MBP/ADV) 100 mL  3.375 g IntraVENous Q8H    vancomycin (VANCOCIN) 1,000 mg in 0.9% sodium chloride (MBP/ADV) 250 mL  1,000 mg IntraVENous Q24H    midazolam (VERSED) injection 2 mg  2 mg IntraVENous Q2H PRN    morphine injection 4 mg  4 mg IntraVENous Q3H PRN    morphine injection 2 mg  2 mg IntraVENous Q3H PRN    morphine injection 3 mg  3 mg IntraVENous Q3H PRN    oxyCODONE-acetaminophen (PERCOCET) 5-325 mg per tablet 1 Tab  1 Tab Oral Q4H PRN    sodium chloride (NS) flush 5-10 mL  5-10 mL IntraVENous PRN    lactated ringers infusion  125 mL/hr IntraVENous CONTINUOUS    ondansetron (ZOFRAN) injection 4 mg  4 mg IntraVENous Q4H PRN    famotidine (PF) (PEPCID) injection 20 mg  20 mg IntraVENous DAILY    albumin human 25% (BUMINATE) solution 12.5 g  12.5 g IntraVENous Q8H    [START ON 4/20/2017] levothyroxine (SYNTHROID) injection 37.5 mcg  37.5 mcg IntraVENous Q24H       Review of Systems  Constitutional:  negative for fever, chills, sweats  Cardiovascular:  negative for chest pain, palpitations, syncope, edema  Gastrointestinal:  negative for dysphagia, reflux, vomiting, diarrhea, + abdominal pain, or melena  Neurologic:  negative for focal weakness, numbness, headache      Objective:     Vitals:    04/17/17 1148 04/17/17 1209 04/17/17 1213 04/17/17 1233   BP: 143/63 (!) 88/40 102/45 103/45   Pulse: 83 100 (!) 101 96   Resp: 25 22 (!) 34 18   Temp: 98.8 °F (37.1 °C)      SpO2: 97% 97% 96% 95%   Weight:       Height:           Intake and Output:   04/15 1901 - 04/17 0700  In: 5176 [I.V.:5176]  Out: 5401 [Urine:1465; Drains:1712]       Physical Exam:          Constitutional:  the patient is well developed and in no acute distress, on room air   EENMT:  Sclera clear, pupils equal, oral mucosa moist  Respiratory: bibasilar crackles   Cardiovascular:  RRR without M,G,R  Gastrointestinal: soft and non-tender; with positive bowel sounds. Musculoskeletal: warm without cyanosis. There is no lower leg edema. Skin:  no jaundice or rashes, abdominal drains x 2  Neurologic: no gross neuro deficits     Psychiatric:  alert and oriented x 3    LINES:  CVC, peripheral, drain x 2, ballesteros     DRIPS:   LR, Levophed     CXR: 4/16/17      LAB  No results for input(s): GLUCPOC in the last 72 hours.     No lab exists for component: [place]   Recent Labs      04/17/17   0336  04/16/17   0400  04/16/17   0030  04/15/17   1320   WBC 15.8*  21.6*  23.1*  22.6*   HGB  8.3*  8.2*  9.2*  10.8*   HCT  25.5*  25.0*  27.8*  32.2*   PLT  603*  619*  715*  896*   INR   --    --    --   1.2     Recent Labs      04/17/17   0336  04/16/17   0400  04/16/17   0030   NA  142  143  138   K  3.8  4.2  4.5   CL  110*  112*  107   CO2  20*  20*  21   GLU  103*  92  100   BUN  44*  56*  58*   CREA  1.75*  1.87*  2.45*   MG  1.7*   --    --    PHOS  4.1*   --    --    CA  8.0*  8.0*  8.2*   ALB  1.7*  1.5*  1.4*   TBILI  1.2*  1.6*  1.7*   ALT  31  29  38   SGOT  43*  30  19     Recent Labs      04/16/17   1510   PH  7.37   PCO2  28*   PO2  96   HCO3  16*     No results for input(s): LCAD, LAC in the last 72 hours. Assessment:  (Medical Decision Making)     Hospital Problems  Date Reviewed: 4/17/2017          Codes Class Noted POA    Anemia ICD-10-CM: D64.9  ICD-9-CM: 285.9  4/16/2017 Unknown    hgb 8.3    * (Principal)Severe sepsis with septic shock (HCC) ICD-10-CM: A41.9, R65.21  ICD-9-CM: 038.9, 995.92, 785.52  4/15/2017 Yes    Improving     Ascites ICD-10-CM: R18.8  ICD-9-CM: 789.59  4/15/2017 Yes    S/p abdominal drains     Abdominal distension ICD-10-CM: R14.0  ICD-9-CM: 787.3  4/15/2017 Yes    Ongoing, improved today     Hypotension ICD-10-CM: I95.9  ICD-9-CM: 458.9  4/15/2017 Yes    Remains on levophed, attempting to wean today    S/P laparoscopic cholecystectomy ICD-10-CM: Z90.49  ICD-9-CM: V45.89  4/15/2017 Yes     3 weeks ago by Dr. Leah Casey             OMERO (acute kidney injury) [facility]) ICD-10-CM: N17.9  ICD-9-CM: 584.9  4/15/2017 Yes    Creatinine 1.75    Leukocytosis ICD-10-CM: Q70.770  ICD-9-CM: 288.60  4/15/2017 Yes    WBC 15.8     Protein calorie malnutrition ([facility] ICD-10-CM: E46  ICD-9-CM: 263.9  4/15/2017 Unknown     With Albumin < 2 currently.                     Plan:  (Medical Decision Making)     --Continue Albumin  --LR at 125 ml/hr   --Zosyn, Vanc day 3   Urine culture with mixed skin piter, pending   Blood cultures x 2 NGTD   --O2 weaned to room air, O2 sat 93-94%  --Continue to wean Levophed as able      More than 50% of the time documented was spent in face-to-face contact with the patient and in the care of the patient on the floor/unit where the patient is located. Soham Haynes NP    Lungs: clear  Heart:  RRR with no Murmur/Rubs/Gallops    Additional Comments: Feeling better. Now on RA. Weaning off pressors. I have examined the patient. I agree with the above assessment and plan as documented.     Edgar Davidson MD

## 2017-04-17 NOTE — PROGRESS NOTES
Drainage from ABD drains has lessened considerably. No leaking from dressings. Patient very restful but easily awoken. Patient denies pain at this time. On levo for BP, will attempt to wean. No further needs expressed or apparent at this time.

## 2017-04-17 NOTE — PROGRESS NOTES
Surgery Addendum  I was out of town from 4/14/17 until I returned this morning. Unaware of the events until my return this morning. I went up and spoke to the patient and her . I am very surprised by the findings as she did not look toxic/septic when I saw her on 4/13/17. My suspicion is that the clips placed on the cystic duct necrosed the duct leaving a leak. Will start ice chips and sips of water today. She is still very distended.   Jossue Patel MD.

## 2017-04-17 NOTE — PROGRESS NOTES
Patient remains alert/oriented. Patient reports return of moderate pain, medicated per MAR. Dressings to drains clean and intact. Minimal drainage to both, still brown. UOP 10-4, aram. Patient with moderate BPs with need for some additional levo per MAR. Patient denies any further needs at current.

## 2017-04-17 NOTE — PROGRESS NOTES
Spiritual Care Assessment/Progress Notes    Salazar Mills [id number]  [id number]    1940  68 y.o.  female    Patient Telephone Number: [phone] (home)   Moravian Affiliation: Buddhism   Language: English   Extended Emergency Contact Information  Primary Emergency Contact: Richar Garcia  Address: [address] [place] Phone: [phone]  Relation: Spouse   Patient Active Problem List    Diagnosis Date Noted    Anemia 04/16/2017    Severe sepsis with septic shock ([facility] 04/15/2017    Ascites 04/15/2017    Abdominal distension 04/15/2017    Hypotension 04/15/2017    S/P laparoscopic cholecystectomy 04/15/2017    OMERO (acute kidney injury) ([facility] 04/15/2017    Leukocytosis 04/15/2017    [facility] malnutrition [facility] 04/15/2017    DM type 2 (diabetes mellitus, type 2) ([facility]     Hypercholesterolemia     Benign essential hypertension     Hypothyroidism, acquired         Date: 4/17/2017       Level of Moravian/Spiritual Activity:  [x]         Involved in vicky tradition/spiritual practice    []         Not involved in vicky tradition/spiritual practice  [x]         Spiritually oriented    []         Claims no spiritual orientation    []         seeking spiritual identity  []         Feels alienated from Christianity practice/tradition  []         Feels angry about Christianity practice/tradition  [x]         Spirituality/Christianity tradition is a resource for coping at this time.   []         Not able to assess due to medical condition    Services Provided Today:  []         crisis intervention    []         reading Scriptures  [x]         spiritual assessment    []         prayer  [x]         empathic listening/emotional support  []         rites and rituals (cite in comments)  []         life review     []         Christianity support  []         theological development   []         advocacy  []         ethical dialog     [] blessing  []         bereavement support    [x]         support to family  []         anticipatory grief support   []         help with AMD  []         spiritual guidance    []         meditation      Spiritual Care Needs  []         Emotional Support  []         Spiritual/Jewish Care  []         Loss/Adjustment  []         Advocacy/Referral                /Ethics  [x]         No needs expressed at               this time  []         Other: (note in               comments)  [address]  []         Follow up visits with               pt/family  []         Provide materials  []         Schedule sacraments  []         Contact Community               Clergy  [x]         Follow up as needed  []         Other: (note in               comments)     Comments: patient awake and talkative during my visit  Patient's  and son at bedside  Patient presented a positive, hopeful mood as she discussed her physical symptoms and concerns prior to hospitalization   presented as an accurate historian of the patient's events, too  I assured family of continued support from pastoral care  Patient's preferred Gnosticism tradition is Methodism - patient attends a [employer] in [employer], [address] [facility].  Kurt Mack

## 2017-04-17 NOTE — PROGRESS NOTES
PLAN:  Cont supportive ICU care  Wean Levo gtt as tolerated  Control pain/nausea PRN  Remain NPO -- awaiting return of bowel function  Keep ballesteros -- watch UOP in setting of ARF  OOB as tolerated  Wean O2 as tolerated  Follow labs/lytes -- will replete K and Mag today  Cont IV Zosyn/Vanc  VTE prophy      ASSESSMENT:  Admit Date: 4/15/2017   2 Day Post-Op  Procedure(s) with comments:  ENDOSCOPIC RETROGRADE CHOLANGIOPANCREATOGRAPHY - ERCP  ENDOSCOPIC SPHINCTEROTOMY - Sphincterotomy  ENDOSCOPIC STONE EXTRACTION/BALLOON SWEEP - Balloon sweep    BILIARY STENT PLACEMENT - Stent placement     IR drain placement x2 -- 4/16/17    Principal Problem:    Severe sepsis with septic shock ([facility] (4/15/2017)    Active Problems:    Ascites (4/15/2017)      Abdominal distension (4/15/2017)      Hypotension (4/15/2017)      S/P laparoscopic cholecystectomy (4/15/2017)      Overview: 3 weeks ago by Dr. Nano Nichols      OMERO (acute kidney injury) ([facility] (4/15/2017)      Leukocytosis (4/15/2017)      Protein calorie malnutrition ([facility] (4/15/2017)      Overview: With Albumin < 2 currently. Anemia (4/16/2017)         SUBJECTIVE:  Resting in bed. Reports she feels \"much better. \" Spouse at bedside an pleasant but very unhappy regarding situation and need for extra procedures. Remains on 4mcg Levophed with BP low 100s. Denies pain, no N/V. No flatus or BM but feels maybe flatus coming. Reports that abd less distended than prior. AF, VSS. WBCs down to 15.8, Cr down to 1.75, TBili 1.2, LFTs improving. OBJECTIVE:  Constitutional: Alert oriented cooperative patient in no acute distress; appears stated age   Visit Vitals    /51    Pulse 95    Temp 98.6 °F (37 °C)    Resp 18    Ht 5' 5\" (1.651 m)    Wt 72.5 kg (159 lb 13.3 oz)    SpO2 99%    Breastfeeding No    BMI 26.6 kg/m2     Eyes: Sclera are clear. EOMs intact  ENMT: No external lesions, gross hearing normal; no obvious neck masses, no ear or lip lesions  CV: RRR, S1S2.  HR 102, SR on monitor. Normal perfusion, pulses palpable  Resp: No JVD. Breathing is non-labored; no audible wheezing. BBS clear, on O2   GI: Remains distended but softer today per Pt, non-tender, +BS, prior incisions healed, drains x2 patent -- brown drainage (L -- 1590cc output, R -- 122cc output)     : Schultz -- UOP 1045cc/24 hrs  Musculoskeletal: Unremarkable with normal function. No embolic signs or cyanosis.    Neuro: Oriented; moves all 4; no focal deficits  Psychiatric: Normal affect and mood, no memory impairment      Patient Vitals for the past 24 hrs:   BP Temp Pulse Resp SpO2 Height Weight   04/17/17 0933 112/51 - 95 18 99 % - -   04/17/17 0918 108/53 - 98 21 97 % - -   04/17/17 0904 - - (!) 104 28 97 % - -   04/17/17 0903 108/45 - - - - - -   04/17/17 0833 110/43 - 99 18 97 % - -   04/17/17 0818 119/51 - 99 (!) 34 98 % - -   04/17/17 0804 - - 97 (!) 31 98 % - -   04/17/17 0803 113/56 - - - - - -   04/17/17 0748 109/47 - 97 20 98 % - -   04/17/17 0733 124/51 - 91 14 98 % - -   04/17/17 0718 (!) 98/35 98.6 °F (37 °C) 100 18 98 % - -   04/17/17 0703 114/50 - 94 17 98 % - -   04/17/17 0650 96/44 - (!) 102 18 98 % - -   04/17/17 0633 117/49 - 94 17 98 % - -   04/17/17 0618 133/47 - 97 17 98 % - -   04/17/17 0603 113/47 - 93 14 98 % - -   04/17/17 0548 134/49 - 95 16 98 % - -   04/17/17 0533 109/46 - 90 14 98 % - -   04/17/17 0518 116/45 - 91 23 98 % - -   04/17/17 0503 115/46 - 92 21 98 % - -   04/17/17 0448 98/42 - 93 17 98 % - -   04/17/17 0433 103/41 - 94 22 98 % - -   04/17/17 0418 97/40 - 92 13 98 % - -   04/17/17 0403 101/42 - 95 12 98 % - -   04/17/17 0348 121/47 - 97 24 97 % - -   04/17/17 0333 114/45 - 98 22 98 % - -   04/17/17 0318 114/49 - (!) 103 21 96 % - -   04/17/17 0303 100/42 98.2 °F (36.8 °C) 97 16 98 % 5' 5\" (1.651 m) 72.5 kg (159 lb 13.3 oz)   04/17/17 0248 104/42 - 98 15 98 % - -   04/17/17 0233 (!) 100/39 - 96 15 98 % - -   04/17/17 0218 (!) 92/38 - 97 15 98 % - -   04/17/17 0203 (!) 97/37 - 99 16 97 % - -   04/17/17 0148 (!) 97/38 - 97 14 98 % - -   04/17/17 0133 102/44 - 97 16 98 % - -   04/17/17 0118 93/42 - 97 16 98 % - -   04/17/17 0105 99/41 - (!) 101 15 97 % - -   04/17/17 0103 (!) 95/35 - 99 17 98 % - -   04/17/17 0048 (!) 90/38 - (!) 101 16 98 % - -   04/17/17 0033 103/44 - (!) 104 21 98 % - -   04/17/17 0018 (!) 118/38 - (!) 109 25 97 % - -   04/17/17 0010 - - (!) 107 24 99 % - -   04/17/17 0003 107/40 - 99 16 100 % - -   04/16/17 2348 (!) 95/36 - - - - - -   04/16/17 2347 - - (!) 104 18 100 % - -   04/16/17 2333 92/41 - (!) 104 19 100 % - -   04/16/17 2318 (!) 102/38 - (!) 104 19 100 % - -   04/16/17 2303 101/44 97.8 °F (36.6 °C) (!) 103 14 100 % - -   04/16/17 2248 92/42 - (!) 101 18 100 % - -   04/16/17 2233 101/42 - (!) 102 18 100 % - -   04/16/17 2218 (!) 115/37 - (!) 104 20 98 % - -   04/16/17 2204 (!) 115/33 - (!) 110 22 97 % - -   04/16/17 2140 106/42 - (!) 107 22 100 % - -   04/16/17 2130 99/44 - (!) 103 17 99 % - -   04/16/17 2120 92/43 - (!) 102 17 99 % - -   04/16/17 2110 92/41 - - - - - -   04/16/17 2109 - - (!) 102 16 99 % - -   04/16/17 2100 (!) 86/46 - (!) 103 19 99 % - -   04/16/17 2050 93/45 - (!) 102 15 99 % - -   04/16/17 2040 (!) 100/36 - (!) 105 18 98 % - -   04/16/17 2030 (!) 87/26 - (!) 108 24 99 % - -   04/16/17 2022 (!) 80/30 - (!) 106 16 99 % - -   04/16/17 2010 (!) 91/34 - (!) 109 18 98 % - -   04/16/17 2001 - - (!) 114 21 98 % - -   04/16/17 2000 (!) 87/36 - (!) 114 21 98 % - -   04/16/17 1955 - - - - 98 % - -   04/16/17 1950 97/42 97.8 °F (36.6 °C) (!) 116 22 98 % - -   04/16/17 1948 95/42 - (!) 115 18 98 % - -   04/16/17 1942 (!) 88/34 - (!) 117 21 100 % - -   04/16/17 1911 (!) 114/38 - (!) 119 30 98 % - -   04/16/17 1906 (!) 82/42 - (!) 113 17 98 % - -   04/16/17 1901 92/41 - (!) 115 17 99 % - -   04/16/17 1856 97/48 - (!) 117 22 98 % - -   04/16/17 1851 (!) 83/50 - (!) 115 19 97 % - -   04/16/17 1846 (!) 89/49 - (!) 117 26 98 % - -   04/16/17 1841 (!) 85/58 - (!) 119 16 98 % - -   04/16/17 1836 116/58 - (!) 121 29 99 % - -   04/16/17 1831 99/60 - (!) 120 28 99 % - -   04/16/17 1828 - - (!) 123 29 97 % - -   04/16/17 1826 105/56 - (!) 120 30 99 % - -   04/16/17 1746 93/52 - (!) 121 (!) 40 97 % - -   04/16/17 1734 92/54 - (!) 122 (!) 39 (!) 83 % - -   04/16/17 1731 (!) 89/50 - (!) 118 (!) 32 98 % - -   04/16/17 1716 103/53 - (!) 120 24 99 % - -   04/16/17 1714 93/52 - (!) 119 (!) 33 98 % - -   04/16/17 1646 94/50 - (!) 117 (!) 31 99 % - -   04/16/17 1631 (!) 86/58 - (!) 117 29 97 % - -   04/16/17 1616 95/52 - (!) 119 27 97 % - -   04/16/17 1601 99/52 97.8 °F (36.6 °C) (!) 120 28 96 % - -   04/16/17 1546 (!) 84/49 - (!) 118 24 97 % - -   04/16/17 1531 (!) 81/50 - (!) 119 21 95 % - -   04/16/17 1525 - - (!) 114 26 96 % - -   04/16/17 1524 (!) 87/49 - (!) 118 28 94 % - -   04/16/17 1516 (!) 86/45 - (!) 119 23 98 % - -   04/16/17 1501 (!) 82/46 - (!) 119 21 97 % - -   04/16/17 1448 90/48 - (!) 121 24 97 % - -   04/16/17 1426 102/53 97.6 °F (36.4 °C) (!) 107 18 95 % - -   04/16/17 1258 102/57 - 92 16 100 % - -   04/16/17 1115 96/45 - (!) 101 23 94 % - -   04/16/17 1100 97/44 - (!) 105 28 93 % - -   04/16/17 1045 (!) 89/47 - (!) 105 25 94 % - -   04/16/17 1030 105/57 - (!) 108 24 - - -   04/16/17 1015 123/48 - - - - - -       Labs:  Recent Labs      04/17/17   0336   04/15/17   1320   WBC  15.8*   < >  22.6*   HGB  8.3*   < >  10.8*   PLT  603*   < >  896*   NA  142   < >  135*   K  3.8   < >  4.7   CL  110*   < >  101   CO2  20*   < >  22   BUN  44*   < >  62*   CREA  1.75*   < >  2.40*   GLU  103*   < >  190*   PTP   --    --   12.6*   INR   --    --   1.2   TBILI  1.2*   < >  1.8*   CBIL  0.8*   --   1.0*   SGOT  43*   < >  46*   ALT  31   < >  48   AP  112   < >  208*   LPSE   --    --   284    < > = values in this interval not displayed.          Osmin Patterson, NP-C

## 2017-04-17 NOTE — PROGRESS NOTES
Problem: Breathing Pattern - Ineffective  Goal: *Absence of hypoxia  Outcome: Progressing Towards Goal  Able to wean pt from 4L to 2L of oxygen. IS reviewed and goals are set. Pt verbal and able to  demonstrate proper use. Will wean O2 as pt tolerate.

## 2017-04-17 NOTE — PROGRESS NOTES
[facility]. Dressings to drains C/D/I. Minimal brown drainage to both.

## 2017-04-17 NOTE — INTERDISCIPLINARY ROUNDS
Interdisciplinary team rounds were held 4/17/2017 with the following team members:Nursing, Nurse Practitioner, Nutrition, Palliative Care, Pastoral Care, Pharmacy, Physician, Respiratory Therapy and Clinical Coordinator and the patient. Plan of care discussed. See clinical pathway and/or care plan for interventions and desired outcomes.

## 2017-04-17 NOTE — PROGRESS NOTES
Patient has tolerated post extubation well. Patient with some hypotension issues, started on low dose levophed.

## 2017-04-17 NOTE — PROGRESS NOTES
GI DAILY PROGRESS NOTE    Admit Date:  4/15/2017    Today's Date:  4/17/2017    CC:  Still having some abd pain, no n/v,     Subjective:     Patient is a 68year old woman with bile leak thought to be from cystic duct remnant. ERCP yesterday with placement of 10 F stent. Medications:   Current Facility-Administered Medications   Medication Dose Route Frequency    NOREPINephrine (LEVOPHED) 4,000 mcg in dextrose 5% 250 mL infusion  2-16 mcg/min IntraVENous TITRATE    piperacillin-tazobactam (ZOSYN) 3.375 g in 0.9% sodium chloride (MBP/ADV) 100 mL  3.375 g IntraVENous Q8H    vancomycin (VANCOCIN) 1,000 mg in 0.9% sodium chloride (MBP/ADV) 250 mL  1,000 mg IntraVENous Q24H    midazolam (VERSED) injection 2 mg  2 mg IntraVENous Q2H PRN    morphine injection 4 mg  4 mg IntraVENous Q3H PRN    morphine injection 2 mg  2 mg IntraVENous Q3H PRN    morphine injection 3 mg  3 mg IntraVENous Q3H PRN    oxyCODONE-acetaminophen (PERCOCET) 5-325 mg per tablet 1 Tab  1 Tab Oral Q4H PRN    sodium chloride (NS) flush 5-10 mL  5-10 mL IntraVENous PRN    lactated ringers infusion  125 mL/hr IntraVENous CONTINUOUS    ondansetron (ZOFRAN) injection 4 mg  4 mg IntraVENous Q4H PRN    famotidine (PF) (PEPCID) injection 20 mg  20 mg IntraVENous DAILY    albumin human 25% (BUMINATE) solution 12.5 g  12.5 g IntraVENous Q8H    [START ON 4/20/2017] levothyroxine (SYNTHROID) injection 37.5 mcg  37.5 mcg IntraVENous Q24H       Review of Systems:  ROS was obtained, with pertinent positives as listed above. No chest pain or SOB.     Diet:      Objective:   Vitals:  Visit Vitals    /45    Pulse 96    Temp 98.8 °F (37.1 °C)    Resp 18    Ht 5' 5\" (1.651 m)    Wt 72.5 kg (159 lb 13.3 oz)    SpO2 95%    Breastfeeding No    BMI 26.6 kg/m2     Intake/Output:     04/15 1901 - 04/17 0700  In: 5176 [I.V.:5176]  Out: 6558 [Urine:1465; Drains:1712]  Exam:  General appearance: alert, cooperative, no distress  Lungs: clear to auscultation bilaterally anteriorly  Heart: regular rate and rhythm  Abdomen: soft, mild tenderness, distended Bowel sounds normal. No masses, no organomegaly, drain with dark bile. Extremities: extremities normal, atraumatic, no cyanosis or edema  Neuro:  alert and oriented    Data Review (Labs):    Recent Labs      04/17/17   0336  04/16/17   0400  04/16/17   0030  04/15/17   1320   WBC  15.8*  21.6*  23.1*  22.6*   HGB  8.3*  8.2*  9.2*  10.8*   HCT  25.5*  25.0*  27.8*  32.2*   PLT  603*  619*  715*  896*   MCV  82.3  82.2  82.7  81.7   NA  142  143  138  135*   K  3.8  4.2  4.5  4.7   CL  110*  112*  107  101   CO2  20*  20*  21  22   BUN  44*  56*  58*  62*   CREA  1.75*  1.87*  2.45*  2.40*   CA  8.0*  8.0*  8.2*  9.8   MG  1.7*   --    --    --    GLU  103*  92  100  190*   AP  112  146*  161*  208*   SGOT  43*  30  19  46*   ALT  31  29  38  48   TBILI  1.2*  1.6*  1.7*  1.8*   CBIL  0.8*   --    --   1.0*   ALB  1.7*  1.5*  1.4*  1.8*   TP  5.3*  5.5*  5.8*  7.5   LPSE   --    --    --   284   PTP   --    --    --   12.6*   INR   --    --    --   1.2       Assessment:     Principal Problem:    Severe sepsis with septic shock (HCC) (4/15/2017)    Active Problems:    Ascites (4/15/2017)      Abdominal distension (4/15/2017)      Hypotension (4/15/2017)      S/P laparoscopic cholecystectomy (4/15/2017)      Overview: 3 weeks ago by Dr. Brian Yip      OMERO (acute kidney injury) ([facility] (4/15/2017)      Leukocytosis (4/15/2017)      Protein calorie malnutrition ([facility] (4/15/2017)      Overview: With Albumin < 2 currently.        Anemia (4/16/2017)        Plan:   Trend LFTs  Follow perc drain output  Will need Repeat ERCP for stent management 8 weeks

## 2017-04-17 NOTE — PROGRESS NOTES
Respiratory Mechanics completed and are as follows:  Weaning Parameters  Spontaneous Breathing Trial Complete: Yes  Resp Rate Observed: 19  Ve: 10.9  VT: 420  RSBI: 45  NIF:  (no NIF needed per Dr Yahir Kaminski)  VC: 757  Titus Agitation Sedation Scale (RASS): Alert and calm  Patient extubated to a 4L NC. Patient is able to communicate and is negative for stridor. Breath sounds are clear. No complications with extubation.      Niall Gonzalez, RT

## 2017-04-17 NOTE — PROGRESS NOTES
GI DAILY PROGRESS NOTE    Admit Date:  4/15/2017    Today's Date:  4/17/2017    CC:  Bile leak    Subjective:     Patient s/p ERCP with stent placement 4/16/17 for bile leak. Also has two drains in place in abdomen with 1700 mL output over the last day. She is feeling better. Denies nausea, vomiting and abdominal pain. +flatus. Medications:   Current Facility-Administered Medications   Medication Dose Route Frequency    NOREPINephrine (LEVOPHED) 4,000 mcg in dextrose 5% 250 mL infusion  2-16 mcg/min IntraVENous TITRATE    piperacillin-tazobactam (ZOSYN) 3.375 g in 0.9% sodium chloride (MBP/ADV) 100 mL  3.375 g IntraVENous Q8H    vancomycin (VANCOCIN) 1,000 mg in 0.9% sodium chloride (MBP/ADV) 250 mL  1,000 mg IntraVENous Q24H    midazolam (VERSED) injection 2 mg  2 mg IntraVENous Q2H PRN    morphine injection 4 mg  4 mg IntraVENous Q3H PRN    morphine injection 2 mg  2 mg IntraVENous Q3H PRN    morphine injection 3 mg  3 mg IntraVENous Q3H PRN    oxyCODONE-acetaminophen (PERCOCET) 5-325 mg per tablet 1 Tab  1 Tab Oral Q4H PRN    sodium chloride (NS) flush 5-10 mL  5-10 mL IntraVENous PRN    lactated ringers infusion  125 mL/hr IntraVENous CONTINUOUS    ondansetron (ZOFRAN) injection 4 mg  4 mg IntraVENous Q4H PRN    famotidine (PF) (PEPCID) injection 20 mg  20 mg IntraVENous DAILY    albumin human 25% (BUMINATE) solution 12.5 g  12.5 g IntraVENous Q8H    [START ON 4/20/2017] levothyroxine (SYNTHROID) injection 37.5 mcg  37.5 mcg IntraVENous Q24H       Review of Systems:  No chest pain or SOB.     Diet:  Npo except ice chips    Objective:   Vitals:  Visit Vitals    /45    Pulse 96    Temp 98.8 °F (37.1 °C)    Resp 18    Ht 5' 5\" (1.651 m)    Wt 72.5 kg (159 lb 13.3 oz)    SpO2 95%    Breastfeeding No    BMI 26.6 kg/m2     Intake/Output:     04/15 1901 - 04/17 0700  In: 5176 [I.V.:5176]  Out: 1702 [Urine:1465; Drains:1712]  Exam:  General appearance: alert, cooperative, no distress  Lungs: clear to auscultation bilaterally anteriorly  Heart: regular rate and rhythm  Abdomen: soft, non-tender. Bowel sounds normal. Bilateral abdominal drains in place with dark green bile  Extremities: extremities normal, atraumatic, no cyanosis or edema  Neuro:  alert and oriented    Data Review (Labs):    Recent Labs      04/17/17   0336  04/16/17   0400  04/16/17   0030  04/15/17   1320   WBC  15.8*  21.6*  23.1*  22.6*   HGB  8.3*  8.2*  9.2*  10.8*   HCT  25.5*  25.0*  27.8*  32.2*   PLT  603*  619*  715*  896*   MCV  82.3  82.2  82.7  81.7   NA  142  143  138  135*   K  3.8  4.2  4.5  4.7   CL  110*  112*  107  101   CO2  20*  20*  21  22   BUN  44*  56*  58*  62*   CREA  1.75*  1.87*  2.45*  2.40*   CA  8.0*  8.0*  8.2*  9.8   MG  1.7*   --    --    --    GLU  103*  92  100  190*   AP  112  146*  161*  208*   SGOT  43*  30  19  46*   ALT  31  29  38  48   TBILI  1.2*  1.6*  1.7*  1.8*   CBIL  0.8*   --    --   1.0*   ALB  1.7*  1.5*  1.4*  1.8*   TP  5.3*  5.5*  5.8*  7.5   LPSE   --    --    --   284   PTP   --    --    --   12.6*   INR   --    --    --   1.2       Assessment:     Principal Problem:    Severe sepsis with septic shock (HCC) (4/15/2017)    Active Problems:    Ascites (4/15/2017)      Abdominal distension (4/15/2017)      Hypotension (4/15/2017)      S/P laparoscopic cholecystectomy (4/15/2017)      Overview: 3 weeks ago by Dr. Ilir Brennan      OMERO (acute kidney injury) ([facility] (4/15/2017)      Leukocytosis (4/15/2017)      Protein calorie malnutrition ([facility] (4/15/2017)      Overview: With Albumin < 2 currently. Anemia (4/16/2017)      67 y/o female admitted with septic shock secondary to bile leak s/p lap patrice.   S/p ERCP with stent placement 4/16/17, on zosyn and vanco with improvement in WBC count, bili and alk phos (ALT and AST 31 and 43 respectively.)  Plan:   1) continue abx  2) monitor abdominal drain output (>1700 mL in last day)  3) will need EGD with stent removal in 8-10 weeks    Patient is seen and examined in collaboration with Dr. Vivian Torres. Assessment and plan as per Dr. Viviane Parker.   Milton Oppenheim, PA

## 2017-04-18 LAB
ANION GAP BLD CALC-SCNC: 13 MMOL/L (ref 7–16)
BASOPHILS # BLD AUTO: 0 K/UL (ref 0–0.2)
BASOPHILS # BLD: 0 % (ref 0–2)
BUN SERPL-MCNC: 26 MG/DL (ref 8–23)
CALCIUM SERPL-MCNC: 7.9 MG/DL (ref 8.3–10.4)
CHLORIDE SERPL-SCNC: 112 MMOL/L (ref 98–107)
CO2 SERPL-SCNC: 20 MMOL/L (ref 21–32)
CREAT SERPL-MCNC: 1.07 MG/DL (ref 0.6–1)
DIFFERENTIAL METHOD BLD: ABNORMAL
EOSINOPHIL # BLD: 0.7 K/UL (ref 0–0.8)
EOSINOPHIL NFR BLD: 7 % (ref 0.5–7.8)
ERYTHROCYTE [DISTWIDTH] IN BLOOD BY AUTOMATED COUNT: 13.8 % (ref 11.9–14.6)
GLUCOSE SERPL-MCNC: 87 MG/DL (ref 65–100)
HAV IGM SERPL QL IA: NEGATIVE
HBV CORE IGM SERPL QL IA: NEGATIVE
HBV SURFACE AG SERPL QL IA: NEGATIVE
HCT VFR BLD AUTO: 21.7 % (ref 35.8–46.3)
HCV AB S/CO SERPL IA: <0.1 S/CO RATIO (ref 0–0.9)
HGB BLD-MCNC: 7 G/DL (ref 11.7–15.4)
IMM GRANULOCYTES # BLD: 0 K/UL (ref 0–0.5)
IMM GRANULOCYTES NFR BLD AUTO: 0.3 % (ref 0–5)
LYMPHOCYTES # BLD AUTO: 9 % (ref 13–44)
LYMPHOCYTES # BLD: 0.9 K/UL (ref 0.5–4.6)
MCH RBC QN AUTO: 26.3 PG (ref 26.1–32.9)
MCHC RBC AUTO-ENTMCNC: 32.3 G/DL (ref 31.4–35)
MCV RBC AUTO: 81.6 FL (ref 79.6–97.8)
MONOCYTES # BLD: 0.6 K/UL (ref 0.1–1.3)
MONOCYTES NFR BLD AUTO: 6 % (ref 4–12)
NEUTS SEG # BLD: 8 K/UL (ref 1.7–8.2)
NEUTS SEG NFR BLD AUTO: 78 % (ref 43–78)
PLATELET # BLD AUTO: 609 K/UL (ref 150–450)
PMV BLD AUTO: 9.2 FL (ref 10.8–14.1)
POTASSIUM SERPL-SCNC: 3.3 MMOL/L (ref 3.5–5.1)
RBC # BLD AUTO: 2.66 M/UL (ref 4.05–5.25)
SODIUM SERPL-SCNC: 145 MMOL/L (ref 136–145)
WBC # BLD AUTO: 10.3 K/UL (ref 4.3–11.1)

## 2017-04-18 PROCEDURE — 80048 BASIC METABOLIC PNL TOTAL CA: CPT | Performed by: INTERNAL MEDICINE

## 2017-04-18 PROCEDURE — 77030032490 HC SLV COMPR SCD KNE COVD -B

## 2017-04-18 PROCEDURE — 74011250636 HC RX REV CODE- 250/636: Performed by: INTERNAL MEDICINE

## 2017-04-18 PROCEDURE — 36430 TRANSFUSION BLD/BLD COMPNT: CPT

## 2017-04-18 PROCEDURE — 99232 SBSQ HOSP IP/OBS MODERATE 35: CPT | Performed by: INTERNAL MEDICINE

## 2017-04-18 PROCEDURE — 86900 BLOOD TYPING SEROLOGIC ABO: CPT | Performed by: SURGERY

## 2017-04-18 PROCEDURE — 85025 COMPLETE CBC W/AUTO DIFF WBC: CPT | Performed by: INTERNAL MEDICINE

## 2017-04-18 PROCEDURE — 77030019605

## 2017-04-18 PROCEDURE — 86923 COMPATIBILITY TEST ELECTRIC: CPT | Performed by: SURGERY

## 2017-04-18 PROCEDURE — P9016 RBC LEUKOCYTES REDUCED: HCPCS | Performed by: SURGERY

## 2017-04-18 PROCEDURE — 65270000029 HC RM PRIVATE

## 2017-04-18 PROCEDURE — 30233N1 TRANSFUSION OF NONAUTOLOGOUS RED BLOOD CELLS INTO PERIPHERAL VEIN, PERCUTANEOUS APPROACH: ICD-10-PCS | Performed by: SURGERY

## 2017-04-18 PROCEDURE — 74011000250 HC RX REV CODE- 250: Performed by: SURGERY

## 2017-04-18 PROCEDURE — 74011250636 HC RX REV CODE- 250/636: Performed by: SURGERY

## 2017-04-18 PROCEDURE — 74011000258 HC RX REV CODE- 258: Performed by: INTERNAL MEDICINE

## 2017-04-18 PROCEDURE — 36415 COLL VENOUS BLD VENIPUNCTURE: CPT | Performed by: SURGERY

## 2017-04-18 RX ORDER — DIPHENHYDRAMINE HYDROCHLORIDE 50 MG/ML
50 INJECTION, SOLUTION INTRAMUSCULAR; INTRAVENOUS
Status: DISCONTINUED | OUTPATIENT
Start: 2017-04-18 | End: 2017-04-22 | Stop reason: HOSPADM

## 2017-04-18 RX ORDER — DIPHENHYDRAMINE HYDROCHLORIDE 50 MG/ML
25 INJECTION, SOLUTION INTRAMUSCULAR; INTRAVENOUS
Status: DISCONTINUED | OUTPATIENT
Start: 2017-04-18 | End: 2017-04-22 | Stop reason: HOSPADM

## 2017-04-18 RX ORDER — POTASSIUM CHLORIDE 29.8 MG/ML
40 INJECTION INTRAVENOUS ONCE
Status: COMPLETED | OUTPATIENT
Start: 2017-04-18 | End: 2017-04-18

## 2017-04-18 RX ORDER — SODIUM CHLORIDE 9 MG/ML
250 INJECTION, SOLUTION INTRAVENOUS AS NEEDED
Status: DISCONTINUED | OUTPATIENT
Start: 2017-04-18 | End: 2017-04-18 | Stop reason: ALTCHOICE

## 2017-04-18 RX ADMIN — VANCOMYCIN HYDROCHLORIDE 1000 MG: 1 INJECTION, POWDER, LYOPHILIZED, FOR SOLUTION INTRAVENOUS at 02:06

## 2017-04-18 RX ADMIN — PIPERACILLIN SODIUM,TAZOBACTAM SODIUM 3.38 G: 3; .375 INJECTION, POWDER, FOR SOLUTION INTRAVENOUS at 23:37

## 2017-04-18 RX ADMIN — FAMOTIDINE 20 MG: 10 INJECTION, SOLUTION INTRAVENOUS at 08:51

## 2017-04-18 RX ADMIN — PIPERACILLIN SODIUM,TAZOBACTAM SODIUM 3.38 G: 3; .375 INJECTION, POWDER, FOR SOLUTION INTRAVENOUS at 16:49

## 2017-04-18 RX ADMIN — DIPHENHYDRAMINE HYDROCHLORIDE 25 MG: 50 INJECTION, SOLUTION INTRAMUSCULAR; INTRAVENOUS at 21:53

## 2017-04-18 RX ADMIN — SODIUM CHLORIDE, SODIUM LACTATE, POTASSIUM CHLORIDE, AND CALCIUM CHLORIDE 125 ML/HR: 600; 310; 30; 20 INJECTION, SOLUTION INTRAVENOUS at 09:13

## 2017-04-18 RX ADMIN — SODIUM CHLORIDE, SODIUM LACTATE, POTASSIUM CHLORIDE, AND CALCIUM CHLORIDE 125 ML/HR: 600; 310; 30; 20 INJECTION, SOLUTION INTRAVENOUS at 00:50

## 2017-04-18 RX ADMIN — PIPERACILLIN SODIUM,TAZOBACTAM SODIUM 3.38 G: 3; .375 INJECTION, POWDER, FOR SOLUTION INTRAVENOUS at 08:50

## 2017-04-18 RX ADMIN — Medication 2 MG: at 23:02

## 2017-04-18 RX ADMIN — POTASSIUM CHLORIDE 40 MEQ: 29.8 INJECTION, SOLUTION INTRAVENOUS at 11:13

## 2017-04-18 NOTE — INTERDISCIPLINARY ROUNDS
Interdisciplinary team rounds were held 4/18/2017 with the following team members:Nursing, Nurse Practitioner, Nutrition, Palliative Care, Pastoral Care, Pharmacy, Physical Therapy, Physician, Physician's Assistant, Respiratory Therapy, Wound Care and Clinical Coordinator and the patient. Plan of care discussed. See clinical pathway and/or care plan for interventions and desired outcomes.

## 2017-04-18 NOTE — PROGRESS NOTES
Nutrition:   Acknowledge Nutrition Consult for Electrolyte Replacement Management. (Dr. Shayla Torres)   The patient presents with no acute nutrition risk factors. Labs are remarkable for potassium 3.3 for which she will receive a 40 meq KCl IV bolus. Nutrition Support Orders for Electrolyte Replacement Management are now activated and on the MAR. Follow-up based on standards of care. Nir Nearing.  Sheree Jacobs   [phone]

## 2017-04-18 NOTE — PROGRESS NOTES
1st of 2 units PRBCs started. BP remains stable off pressors. VSS. No complaints/needs voiced. Family at bedside. Call light in reach.

## 2017-04-18 NOTE — PROGRESS NOTES
Critical Care Daily Progress Note: 4/18/2017    Court Pollock   Admission Date: 4/15/2017         The patient's chart is reviewed and the patient is discussed with the staff. 68 y.o.  female seen and evaluated at the request of Dr. Alessia Peter. Patient s/p recent lap patrice 3 weeks ago by Dr. Nano Nichols. Apparently with increased abdominal swelling and bloating soon afterwards. Appetite has been low and getting overall weaker. No reported N/V. Has been moving bowels. No fevers or chills reported. In ER BP is 80's. Also WBC of 22k. Given fluids and still low. CT of abdomen done and noted marked ascitics but no obstruction. Seen by GI and Surgery. Going for [employer]. In ER given Zosyn and fluids. Also noted to have acute renal failure and elevated LFTS with low albumin of 1.8. LA was only 1.9  4/16 ERCP with cystic duck leak- stent placed, and common bile duct stone noted. Needs EGD with stent removal in 8-10 weeks. Post ERCP, bp marginal. Required levophed   IR consulted for peritoneal drainage and drains were placed. Subjective:     Patient awake and alert, conversant.   On room air without c/o sob or trouble breath  Productive cough but swallowed  Had clears this morning with patient stating she feels like her belly is \"tight\"     Current Facility-Administered Medications   Medication Dose Route Frequency    0.9% sodium chloride infusion 250 mL  250 mL IntraVENous PRN    potassium chloride 40 mEq IVPB  40 mEq IntraVENous ONCE    [START ON 4/19/2017] Vancomycin Trough Reminder   Other ONCE    NUTRITIONAL SUPPORT ELECTROLYTE PRN ORDERS   Does Not Apply PRN    NOREPINephrine (LEVOPHED) 4,000 mcg in dextrose 5% 250 mL infusion  2-16 mcg/min IntraVENous TITRATE    piperacillin-tazobactam (ZOSYN) 3.375 g in 0.9% sodium chloride (MBP/ADV) 100 mL  3.375 g IntraVENous Q8H    vancomycin (VANCOCIN) 1,000 mg in 0.9% sodium chloride (MBP/ADV) 250 mL  1,000 mg IntraVENous Q24H  midazolam (VERSED) injection 2 mg  2 mg IntraVENous Q2H PRN    morphine injection 4 mg  4 mg IntraVENous Q3H PRN    morphine injection 2 mg  2 mg IntraVENous Q3H PRN    morphine injection 3 mg  3 mg IntraVENous Q3H PRN    oxyCODONE-acetaminophen (PERCOCET) 5-325 mg per tablet 1 Tab  1 Tab Oral Q4H PRN    sodium chloride (NS) flush 5-10 mL  5-10 mL IntraVENous PRN    lactated ringers infusion  125 mL/hr IntraVENous CONTINUOUS    ondansetron (ZOFRAN) injection 4 mg  4 mg IntraVENous Q4H PRN    famotidine (PF) (PEPCID) injection 20 mg  20 mg IntraVENous DAILY    [START ON 4/20/2017] levothyroxine (SYNTHROID) injection 37.5 mcg  37.5 mcg IntraVENous Q24H       Review of Systems  Constitutional:  negative for fever, chills, sweats  Cardiovascular:  negative for chest pain, palpitations, syncope, trace edema  Gastrointestinal:  negative for dysphagia, reflux, vomiting, diarrhea, abdominal pain, or melena  Neurologic:  negative for focal weakness, numbness, headache      Objective:     Vitals:    04/18/17 1019 04/18/17 1034 04/18/17 1049 04/18/17 1113   BP: 118/63 122/61 131/63    Pulse: 92 93 88    Resp: 24 28 25    Temp:    98.5 °F (36.9 °C)   SpO2: 94% 95% 95%    Weight:       Height:           Intake and Output:   04/16 1901 - 04/18 0700  In: 6062.9 [P.O.:60; I.V.:6002.9]  Out: 9293 [Urine:1945; Drains:2473]  04/18 0701 - 04/18 1900  In: 31.5 [I.V.:31.5]  Out: -     Physical Exam:          Constitutional:  the patient is well developed and in no acute distress, on room air   EENMT:  Sclera clear, pupils equal, oral mucosa moist  Respiratory: clear   Cardiovascular:  RRR without M,G,R  Gastrointestinal: soft and non-tender; with positive bowel sounds. Musculoskeletal: warm without cyanosis. There is trace  lower leg edema.   Skin:  no jaundice or rashes, abd drains x 2    Neurologic: no gross neuro deficits     Psychiatric:  alert and oriented x 3    LINES:  CVC, peripheral, drains x 2, ballesteros     DRIPS: LR     CXR:       LAB  No results for input(s): GLUCPOC in the last 72 hours. No lab exists for component: [place]   Recent Labs      04/18/17   0455  04/17/17   0336  04/16/17   0400   04/15/17   1320   WBC  10.3  15.8*  21.6*   < >  22.6*   HGB  7.0*  8.3*  8.2*   < >  10.8*   HCT  21.7*  25.5*  25.0*   < >  32.2*   PLT  609*  603*  619*   < >  896*   INR   --    --    --    --   1.2    < > = values in this interval not displayed. Recent Labs      04/18/17   0455  04/17/17   0336  04/16/17   0400  04/16/17   0030   NA  145  142  143  138   K  3.3*  3.8  4.2  4.5   CL  112*  110*  112*  107   CO2  20*  20*  20*  21   GLU  87  103*  92  100   BUN  26*  44*  56*  58*   CREA  1.07*  1.75*  1.87*  2.45*   MG   --   1.7*   --    --    PHOS   --   4.1*   --    --    CA  7.9*  8.0*  8.0*  8.2*   ALB   --   1.7*  1.5*  1.4*   TBILI   --   1.2*  1.6*  1.7*   ALT   --   31  29  38   SGOT   --   43*  30  19     Recent Labs      04/16/17   1510   PH  7.37   PCO2  28*   PO2  96   HCO3  16*     No results for input(s): LCAD, LAC in the last 72 hours.     Assessment:  (Medical Decision Making)     Hospital Problems  Date Reviewed: 4/18/2017          Codes Class Noted POA    Anemia ICD-10-CM: D64.9  ICD-9-CM: 285.9  4/16/2017 Unknown    hgb 7.0, 2 units PRBC today     * (Principal)Severe sepsis with septic shock (HCC) ICD-10-CM: A41.9, R65.21  ICD-9-CM: 038.9, 995.92, 785.52  4/15/2017 Yes    Bp better, off pressors     Ascites ICD-10-CM: R18.8  ICD-9-CM: 789.59  4/15/2017 Yes    abd distention today     Abdominal distension ICD-10-CM: R14.0  ICD-9-CM: 787.3  4/15/2017 Yes    Worsened after clears this morning     Hypotension ICD-10-CM: I95.9  ICD-9-CM: 458.9  4/15/2017 Yes    Off pressors     S/P laparoscopic cholecystectomy ICD-10-CM: Z90.49  ICD-9-CM: V45.89  4/15/2017 Yes     3 weeks ago by Dr. Orozco Links             OMERO (acute kidney injury) [facility]) ICD-10-CM: N17.9  ICD-9-CM: 584.9  4/15/2017 Yes    Creatinine 1.07 Leukocytosis ICD-10-CM: O89.009  ICD-9-CM: 288.60  4/15/2017 Yes    WBC 10.3     Protein calorie malnutrition ([facility] ICD-10-CM: E46  ICD-9-CM: 263.9  4/15/2017 Unknown     With Albumin < 2 currently. Plan:  (Medical Decision Making)     --Zosyn, Vanc day 4    Blood cultures NGTD, pending   Urine cx with mixed skin piter   Abdominal fluid culture with GPC   --Off pressors  --On room air   --K 3.3, replacing     More than 50% of the time documented was spent in face-to-face contact with the patient and in the care of the patient on the floor/unit where the patient is located. Shaquille Ann, RUBÉN     Lungs:  clear  Heart:  RRR with no Murmur/Rubs/Gallops    Additional Comments:  Abdomen fairly soft with active BS. Bilious drainage persists from abdomen. Comfortable on RA. Await speciation of GPC in ascitic fluid. Covered with Vanc and zosyn. Will sign off. Call if needed. I have spoken with and examined the patient. I agree with the above assessment and plan as documented.     Hue Villareal MD

## 2017-04-18 NOTE — PROGRESS NOTES
Pt continuing to do well this morning. -130s, MAP >65 off levophed gtt. NSR, HR 93 on monitor. O2 Sat 95% on RA. Using IS as instructed. Assisted oob to recliner w/RN x1. Unsteady and weak on feet but tolerated fair. Provided with lunch tray. Tolerating CL w/o complaints of nausea or abd pain. No needs voiced at this time. Call light in reach.

## 2017-04-18 NOTE — PROGRESS NOTES
Bedside and Verbal shift change report given to [facility] (oncoming nurse) by Micaela Samuel. Report included the following information SBAR, Kardex, ED Summary, OR Summary, Intake/Output, MAR, Recent Results and Cardiac Rhythm NSR. Dual skin assessment completed.

## 2017-04-18 NOTE — PROGRESS NOTES
GI DAILY PROGRESS NOTE    Admit Date:  4/15/2017    Today's Date:  4/18/2017    CC:  Bile leak    Subjective:     Patient s/p ERCP with stent placement 4/16/17 for bile leak. Also has two drains in place in abdomen with 761ml output over the last day (compared to 1700mL the day prior). Denies nausea, vomiting, abdominal pain. No BM but passing flatus. Complains of some dyspnea with talking on the phone and feels like she can't get a deep breath. Levophed stopped this am.  Getting LR at 125 ml/hr and net I/O +1900mL.     Medications:   Current Facility-Administered Medications   Medication Dose Route Frequency    0.9% sodium chloride infusion 250 mL  250 mL IntraVENous PRN    potassium chloride 40 mEq IVPB  40 mEq IntraVENous ONCE    [START ON 4/19/2017] Vancomycin Trough Reminder   Other ONCE    NUTRITIONAL SUPPORT ELECTROLYTE PRN ORDERS   Does Not Apply PRN    NOREPINephrine (LEVOPHED) 4,000 mcg in dextrose 5% 250 mL infusion  2-16 mcg/min IntraVENous TITRATE    piperacillin-tazobactam (ZOSYN) 3.375 g in 0.9% sodium chloride (MBP/ADV) 100 mL  3.375 g IntraVENous Q8H    vancomycin (VANCOCIN) 1,000 mg in 0.9% sodium chloride (MBP/ADV) 250 mL  1,000 mg IntraVENous Q24H    midazolam (VERSED) injection 2 mg  2 mg IntraVENous Q2H PRN    morphine injection 4 mg  4 mg IntraVENous Q3H PRN    morphine injection 2 mg  2 mg IntraVENous Q3H PRN    morphine injection 3 mg  3 mg IntraVENous Q3H PRN    oxyCODONE-acetaminophen (PERCOCET) 5-325 mg per tablet 1 Tab  1 Tab Oral Q4H PRN    sodium chloride (NS) flush 5-10 mL  5-10 mL IntraVENous PRN    lactated ringers infusion  125 mL/hr IntraVENous CONTINUOUS    ondansetron (ZOFRAN) injection 4 mg  4 mg IntraVENous Q4H PRN    famotidine (PF) (PEPCID) injection 20 mg  20 mg IntraVENous DAILY    [START ON 4/20/2017] levothyroxine (SYNTHROID) injection 37.5 mcg  37.5 mcg IntraVENous Q24H       Review of Systems:  C/o SOB with talking and feeling like she can't get a deep breath. No CP    Diet:  clears    Objective:   Vitals:  Visit Vitals    /51    Pulse 86    Temp 98.8 °F (37.1 °C)    Resp 19    Ht 5' 5\" (1.651 m)    Wt 76.6 kg (168 lb 14 oz)    SpO2 96%    Breastfeeding No    BMI 28.1 kg/m2     Intake/Output:  04/18 0701 - 04/18 1900  In: 31.5 [I.V.:31.5]  Out: -   04/16 1901 - 04/18 0700  In: 6062.9 [P.O.:60; I.V.:6002.9]  Out: 0087 [STIAS:7248; Drains:2473]  Exam:  General appearance: alert, cooperative, no distress  Lungs: decreased BS bilaterally at bases  Heart: regular rate and rhythm  Abdomen: soft, non-tender.  Bowel sounds normal. Bilateral abdominal drains in place with dark green bile  Extremities: extremities normal, atraumatic, no cyanosis or edema  Neuro:  alert and oriented    Data Review (Labs):    Recent Labs      04/18/17   0455  04/17/17   0336  04/16/17   0400  04/16/17   0030  04/15/17   1320   WBC  10.3  15.8*  21.6*  23.1*  22.6*   HGB  7.0*  8.3*  8.2*  9.2*  10.8*   HCT  21.7*  25.5*  25.0*  27.8*  32.2*   PLT  609*  603*  619*  715*  896*   MCV  81.6  82.3  82.2  82.7  81.7   NA  145  142  143  138  135*   K  3.3*  3.8  4.2  4.5  4.7   CL  112*  110*  112*  107  101   CO2  20*  20*  20*  21  22   BUN  26*  44*  56*  58*  62*   CREA  1.07*  1.75*  1.87*  2.45*  2.40*   CA  7.9*  8.0*  8.0*  8.2*  9.8   MG   --   1.7*   --    --    --    GLU  87  103*  92  100  190*   AP   --   112  146*  161*  208*   SGOT   --   43*  30  19  46*   ALT   --   31  29  38  48   TBILI   --   1.2*  1.6*  1.7*  1.8*   CBIL   --   0.8*   --    --   1.0*   ALB   --   1.7*  1.5*  1.4*  1.8*   TP   --   5.3*  5.5*  5.8*  7.5   LPSE   --    --    --    --   284   PTP   --    --    --    --   12.6*   INR   --    --    --    --   1.2       Assessment:     Principal Problem:    Severe sepsis with septic shock (HCC) (4/15/2017)    Active Problems:    Ascites (4/15/2017)      Abdominal distension (4/15/2017)      Hypotension (4/15/2017)      S/P laparoscopic cholecystectomy (4/15/2017)      Overview: 3 weeks ago by Dr. Rachel Nava      OMERO (acute kidney injury) ([facility] (4/15/2017)      Leukocytosis (4/15/2017)      Protein calorie malnutrition ([facility] (4/15/2017)      Overview: With Albumin < 2 currently. Anemia (4/16/2017)      69 y/o female admitted with septic shock secondary to bile leak s/p lap patrice. S/p ERCP with stent placement 4/16/17, on zosyn and vanco with improvement in WBC count, bili and alk phos (ALT and AST 31 and 43 respectively on 4/17/17.)  Abdominal fluid culture with GPC (final ID pending), and patient to receive two units PRBCs for hgb 7. Complains of dyspnea and I suspect ? pleural effusions/volume overload  Plan:   1) continue abx  2) monitor abdominal drain output (>700 mL in last day)  3) will need EGD with stent removal in 8-10 weeks  4) defer management of dyspnea to primary team.  May consider CXR, stopping IVFs (if BP allows) +/- diuretics    Patient is seen and examined in collaboration with Dr. Hans Damon. Assessment and plan as per Dr. Simeon Juarez. JOSE Camejo    I have seen and examined, agree with above. Hb slightly decreased, but no melena.  BREE output decreased, WBC improved

## 2017-04-18 NOTE — PROGRESS NOTES
TRANSFER - IN REPORT:    Verbal report received from [facility] on [employer]  being received from CCU for routine progression of care      Report consisted of patients Situation, Background, Assessment and   Recommendations(SBAR). Information from the following report(s) SBAR, Kardex, Intake/Output, MAR and Recent Results was reviewed with the receiving nurse. Opportunity for questions and clarification was provided. Assessment completed upon patients arrival to unit and care assumed.

## 2017-04-18 NOTE — PROGRESS NOTES
General Surgery Progress Note    4/18/2017    Admit Date: 4/15/2017    Subjective:     Surgery  Patient seen at 6:15 AM as I had a meeting this morning. She was awake, on low dose Levophed and reporting low grade abdominal pain. She tolerated ice chips and passed flatus yesterday. WBC count down to 10.3 today. Her H/H is low, so will give her two units of blood. This should help her blood pressure. Objective:     Visit Vitals    /51    Pulse 86    Temp 98.8 °F (37.1 °C)    Resp 19    Ht 5' 5\" (1.651 m)    Wt 168 lb 14 oz (76.6 kg)    SpO2 96%    Breastfeeding No    BMI 28.1 kg/m2         Intake/Output Summary (Last 24 hours) at 04/18/17 0830  Last data filed at 04/18/17 0548   Gross per 24 hour   Intake          4011.08 ml   Output             2111 ml   Net          1900.08 ml        EXAM:  ABD softer than yesterday, less distention, active BS's. Drain with bilious material.        Data Review    Recent Results (from the past 24 hour(s))   CBC WITH AUTOMATED DIFF    Collection Time: 04/18/17  4:55 AM   Result Value Ref Range    WBC 10.3 4.3 - 11.1 K/uL    RBC 2.66 (L) 4.05 - 5.25 M/uL    HGB 7.0 (L) 11.7 - 15.4 g/dL    HCT 21.7 (L) 35.8 - 46.3 %    MCV 81.6 79.6 - 97.8 FL    MCH 26.3 26.1 - 32.9 PG    MCHC 32.3 31.4 - 35.0 g/dL    RDW 13.8 11.9 - 14.6 %    PLATELET 357 (H) 311 - 450 K/uL    MPV 9.2 (L) 10.8 - 14.1 FL    DF AUTOMATED      NEUTROPHILS 78 43 - 78 %    LYMPHOCYTES 9 (L) 13 - 44 %    MONOCYTES 6 4.0 - 12.0 %    EOSINOPHILS 7 0.5 - 7.8 %    BASOPHILS 0 0.0 - 2.0 %    IMMATURE GRANULOCYTES 0.3 0.0 - 5.0 %    ABS. NEUTROPHILS 8.0 1.7 - 8.2 K/UL    ABS. LYMPHOCYTES 0.9 0.5 - 4.6 K/UL    ABS. MONOCYTES 0.6 0.1 - 1.3 K/UL    ABS. EOSINOPHILS 0.7 0.0 - 0.8 K/UL    ABS. BASOPHILS 0.0 0.0 - 0.2 K/UL    ABS. IMM.  GRANS. 0.0 0.0 - 0.5 K/UL   METABOLIC PANEL, BASIC    Collection Time: 04/18/17  4:55 AM   Result Value Ref Range    Sodium 145 136 - 145 mmol/L    Potassium 3.3 (L) 3.5 - 5.1 mmol/L Chloride 112 (H) 98 - 107 mmol/L    CO2 20 (L) 21 - 32 mmol/L    Anion gap 13 7 - 16 mmol/L    Glucose 87 65 - 100 mg/dL    BUN 26 (H) 8 - 23 MG/DL    Creatinine 1.07 (H) 0.6 - 1.0 MG/DL    GFR est AA >60 >60 ml/min/1.73m2    GFR est non-AA 53 (L) >60 ml/min/1.73m2    Calcium 7.9 (L) 8.3 - 10.4 MG/DL        Hospital Problems  Date Reviewed: 4/17/2017          Codes Class Noted POA    Anemia ICD-10-CM: D64.9  ICD-9-CM: 285.9  4/16/2017 Unknown        * (Principal)Severe sepsis with septic shock ([facility] ICD-10-CM: A41.9, R65.21  ICD-9-CM: 038.9, 995.92, 785.52  4/15/2017 Yes        Ascites ICD-10-CM: R18.8  ICD-9-CM: 789.59  4/15/2017 Yes        Abdominal distension ICD-10-CM: R14.0  ICD-9-CM: 787.3  4/15/2017 Yes        Hypotension ICD-10-CM: I95.9  ICD-9-CM: 458.9  4/15/2017 Yes        S/P laparoscopic cholecystectomy ICD-10-CM: Z90.49  ICD-9-CM: V45.89  4/15/2017 Yes    Overview Signed 4/15/2017  9:07 PM by Claudia Wells DO     3 weeks ago by Dr. Leah Casey             OMERO (acute kidney injury) [facility]) ICD-10-CM: N17.9  ICD-9-CM: 584.9  4/15/2017 Yes        Leukocytosis ICD-10-CM: I30.744  ICD-9-CM: 288.60  4/15/2017 Yes        Protein calorie malnutrition ([facility] ICD-10-CM: E46  ICD-9-CM: 263.9  4/15/2017 Unknown    Overview Signed 4/15/2017 11:48 PM by Bennett Andrews MD     With Albumin < 2 currently. 1. Transfuse 2 units PRBC's.  2. Clear liquids  3. Wean Levophed  4. OOB as tolerated.   5. Zosyn/Vancomycin  Leah Casey MD.

## 2017-04-18 NOTE — PROGRESS NOTES
TRANSFER - OUT REPORT:    Verbal report given to [facility] naa [place]  being transferred to 18 for routine progression of care       Report consisted of patients Situation, Background, Assessment and   Recommendations(SBAR). Information from the following report(s) SBAR, Kardex, ED Summary, Procedure Summary, Intake/Output, MAR, Accordion, Recent Results and Cardiac Rhythm NSR was reviewed with the receiving nurse. Lines:   Peripheral IV 04/15/17 Left Antecubital (Active)   Site Assessment Clean, dry, & intact 4/18/2017  8:00 AM   Phlebitis Assessment 0 4/18/2017  8:00 AM   Infiltration Assessment 0 4/18/2017  8:00 AM   Dressing Status Clean, dry, & intact 4/18/2017  8:00 AM   Dressing Type Tape;Transparent 4/18/2017  8:00 AM   Hub Color/Line Status Green;Flushed 4/18/2017  8:00 AM   Alcohol Cap Used No 4/18/2017  8:00 AM       Peripheral IV 04/18/17 Right Forearm (Active)   Site Assessment Clean, dry, & intact 4/18/2017  5:55 PM   Phlebitis Assessment 0 4/18/2017  5:55 PM   Infiltration Assessment 0 4/18/2017  5:55 PM   Dressing Status Clean, dry, & intact 4/18/2017  5:55 PM   Dressing Type Tape;Transparent 4/18/2017  5:55 PM   Hub Color/Line Status Pink; Infusing;Flushed;Patent 4/18/2017  5:55 PM   Alcohol Cap Used Yes 4/18/2017  5:55 PM        Opportunity for questions and clarification was provided.       Patient transported with:   Patient-specific medications from Pharmacy

## 2017-04-19 PROBLEM — D72.829 LEUKOCYTOSIS: Status: RESOLVED | Noted: 2017-04-15 | Resolved: 2017-04-19

## 2017-04-19 LAB
ABO + RH BLD: NORMAL
ANION GAP BLD CALC-SCNC: 11 MMOL/L (ref 7–16)
BACTERIA SPEC CULT: ABNORMAL
BACTERIA SPEC CULT: ABNORMAL
BASOPHILS # BLD AUTO: 0 K/UL (ref 0–0.2)
BASOPHILS # BLD: 0 % (ref 0–2)
BLD PROD TYP BPU: NORMAL
BLD PROD TYP BPU: NORMAL
BLOOD GROUP ANTIBODIES SERPL: NORMAL
BPU ID: NORMAL
BPU ID: NORMAL
BUN SERPL-MCNC: 18 MG/DL (ref 8–23)
CALCIUM SERPL-MCNC: 7.6 MG/DL (ref 8.3–10.4)
CHLORIDE SERPL-SCNC: 113 MMOL/L (ref 98–107)
CO2 SERPL-SCNC: 21 MMOL/L (ref 21–32)
CREAT SERPL-MCNC: 0.81 MG/DL (ref 0.6–1)
CROSSMATCH RESULT,%XM: NORMAL
CROSSMATCH RESULT,%XM: NORMAL
DIFFERENTIAL METHOD BLD: ABNORMAL
EOSINOPHIL # BLD: 0.6 K/UL (ref 0–0.8)
EOSINOPHIL NFR BLD: 7 % (ref 0.5–7.8)
ERYTHROCYTE [DISTWIDTH] IN BLOOD BY AUTOMATED COUNT: 13.8 % (ref 11.9–14.6)
GLUCOSE SERPL-MCNC: 89 MG/DL (ref 65–100)
HCT VFR BLD AUTO: 31.7 % (ref 35.8–46.3)
HGB BLD-MCNC: 10.8 G/DL (ref 11.7–15.4)
IMM GRANULOCYTES # BLD: 0 K/UL (ref 0–0.5)
IMM GRANULOCYTES NFR BLD AUTO: 0.3 % (ref 0–5)
LYMPHOCYTES # BLD AUTO: 11 % (ref 13–44)
LYMPHOCYTES # BLD: 0.9 K/UL (ref 0.5–4.6)
MCH RBC QN AUTO: 27.4 PG (ref 26.1–32.9)
MCHC RBC AUTO-ENTMCNC: 34.1 G/DL (ref 31.4–35)
MCV RBC AUTO: 80.5 FL (ref 79.6–97.8)
MONOCYTES # BLD: 0.7 K/UL (ref 0.1–1.3)
MONOCYTES NFR BLD AUTO: 9 % (ref 4–12)
NEUTS SEG # BLD: 5.5 K/UL (ref 1.7–8.2)
NEUTS SEG NFR BLD AUTO: 73 % (ref 43–78)
PLATELET # BLD AUTO: 560 K/UL (ref 150–450)
PMV BLD AUTO: 9.1 FL (ref 10.8–14.1)
POTASSIUM SERPL-SCNC: 3.6 MMOL/L (ref 3.5–5.1)
RBC # BLD AUTO: 3.94 M/UL (ref 4.05–5.25)
SERVICE CMNT-IMP: ABNORMAL
SODIUM SERPL-SCNC: 145 MMOL/L (ref 136–145)
SPECIMEN EXP DATE BLD: NORMAL
STATUS OF UNIT,%ST: NORMAL
STATUS OF UNIT,%ST: NORMAL
UNIT DIVISION, %UDIV: 0
UNIT DIVISION, %UDIV: 0
VANCOMYCIN TROUGH SERPL-MCNC: 11.9 UG/ML (ref 5–20)
WBC # BLD AUTO: 7.6 K/UL (ref 4.3–11.1)

## 2017-04-19 PROCEDURE — 74011250637 HC RX REV CODE- 250/637: Performed by: NURSE PRACTITIONER

## 2017-04-19 PROCEDURE — 36415 COLL VENOUS BLD VENIPUNCTURE: CPT | Performed by: INTERNAL MEDICINE

## 2017-04-19 PROCEDURE — 74011000250 HC RX REV CODE- 250: Performed by: SURGERY

## 2017-04-19 PROCEDURE — 74011250637 HC RX REV CODE- 250/637: Performed by: SURGERY

## 2017-04-19 PROCEDURE — 77030036554

## 2017-04-19 PROCEDURE — 74011250636 HC RX REV CODE- 250/636: Performed by: INTERNAL MEDICINE

## 2017-04-19 PROCEDURE — 85025 COMPLETE CBC W/AUTO DIFF WBC: CPT | Performed by: INTERNAL MEDICINE

## 2017-04-19 PROCEDURE — 74011250636 HC RX REV CODE- 250/636: Performed by: NURSE PRACTITIONER

## 2017-04-19 PROCEDURE — 97161 PT EVAL LOW COMPLEX 20 MIN: CPT

## 2017-04-19 PROCEDURE — 65270000029 HC RM PRIVATE

## 2017-04-19 PROCEDURE — 80202 ASSAY OF VANCOMYCIN: CPT | Performed by: INTERNAL MEDICINE

## 2017-04-19 PROCEDURE — 74011000258 HC RX REV CODE- 258: Performed by: INTERNAL MEDICINE

## 2017-04-19 PROCEDURE — 99232 SBSQ HOSP IP/OBS MODERATE 35: CPT | Performed by: INTERNAL MEDICINE

## 2017-04-19 PROCEDURE — 74011250637 HC RX REV CODE- 250/637: Performed by: INTERNAL MEDICINE

## 2017-04-19 PROCEDURE — 77030019605

## 2017-04-19 PROCEDURE — 80048 BASIC METABOLIC PNL TOTAL CA: CPT | Performed by: INTERNAL MEDICINE

## 2017-04-19 RX ORDER — ZOLPIDEM TARTRATE 5 MG/1
5 TABLET ORAL
Status: DISCONTINUED | OUTPATIENT
Start: 2017-04-19 | End: 2017-04-22 | Stop reason: HOSPADM

## 2017-04-19 RX ORDER — FUROSEMIDE 10 MG/ML
20 INJECTION INTRAMUSCULAR; INTRAVENOUS ONCE
Status: COMPLETED | OUTPATIENT
Start: 2017-04-19 | End: 2017-04-19

## 2017-04-19 RX ORDER — OXYCODONE AND ACETAMINOPHEN 5; 325 MG/1; MG/1
1 TABLET ORAL
Status: DISCONTINUED | OUTPATIENT
Start: 2017-04-19 | End: 2017-04-22 | Stop reason: HOSPADM

## 2017-04-19 RX ORDER — LEVOTHYROXINE SODIUM 75 UG/1
75 TABLET ORAL
Status: DISCONTINUED | OUTPATIENT
Start: 2017-04-19 | End: 2017-04-22 | Stop reason: HOSPADM

## 2017-04-19 RX ORDER — FAMOTIDINE 20 MG/1
20 TABLET, FILM COATED ORAL 2 TIMES DAILY
Status: DISCONTINUED | OUTPATIENT
Start: 2017-04-19 | End: 2017-04-22 | Stop reason: HOSPADM

## 2017-04-19 RX ORDER — OXYCODONE AND ACETAMINOPHEN 5; 325 MG/1; MG/1
2 TABLET ORAL
Status: DISCONTINUED | OUTPATIENT
Start: 2017-04-19 | End: 2017-04-22 | Stop reason: HOSPADM

## 2017-04-19 RX ADMIN — VANCOMYCIN HYDROCHLORIDE 750 MG: 10 INJECTION, POWDER, LYOPHILIZED, FOR SOLUTION INTRAVENOUS at 12:23

## 2017-04-19 RX ADMIN — FAMOTIDINE 20 MG: 10 INJECTION, SOLUTION INTRAVENOUS at 07:55

## 2017-04-19 RX ADMIN — FAMOTIDINE 20 MG: 20 TABLET ORAL at 17:25

## 2017-04-19 RX ADMIN — OXYCODONE HYDROCHLORIDE AND ACETAMINOPHEN 1 TABLET: 5; 325 TABLET ORAL at 10:00

## 2017-04-19 RX ADMIN — PIPERACILLIN SODIUM,TAZOBACTAM SODIUM 3.38 G: 3; .375 INJECTION, POWDER, FOR SOLUTION INTRAVENOUS at 07:55

## 2017-04-19 RX ADMIN — SODIUM CHLORIDE, SODIUM LACTATE, POTASSIUM CHLORIDE, AND CALCIUM CHLORIDE 125 ML/HR: 600; 310; 30; 20 INJECTION, SOLUTION INTRAVENOUS at 01:48

## 2017-04-19 RX ADMIN — VANCOMYCIN HYDROCHLORIDE 750 MG: 10 INJECTION, POWDER, LYOPHILIZED, FOR SOLUTION INTRAVENOUS at 01:37

## 2017-04-19 RX ADMIN — OXYCODONE HYDROCHLORIDE AND ACETAMINOPHEN 1 TABLET: 5; 325 TABLET ORAL at 17:26

## 2017-04-19 RX ADMIN — FUROSEMIDE 20 MG: 10 INJECTION, SOLUTION INTRAMUSCULAR; INTRAVENOUS at 10:00

## 2017-04-19 RX ADMIN — ZOLPIDEM TARTRATE 5 MG: 5 TABLET ORAL at 21:11

## 2017-04-19 RX ADMIN — PIPERACILLIN SODIUM,TAZOBACTAM SODIUM 3.38 G: 3; .375 INJECTION, POWDER, FOR SOLUTION INTRAVENOUS at 15:32

## 2017-04-19 RX ADMIN — LEVOTHYROXINE SODIUM 75 MCG: 75 TABLET ORAL at 11:51

## 2017-04-19 NOTE — PROGRESS NOTES
Surgery Addendum  Feels better. Tolerated full liquids. No nausea or vomiting. Moving her bowels. Ambulated in hallway. H/H up. Creatinine back down to baseline. Plan: Will advance diet in AM. Decrease IVF'S.   Jimi Piper MD.

## 2017-04-19 NOTE — PROGRESS NOTES
Date of Outreach Update:  Lito Birch was seen and assessed. Patient is sitting up in reclner, eating breakfast and talking with family. VSS. See pain assessment below. + flatus and stool. MEWS Score: 1 (04/18/17 1910)  Vitals:    04/18/17 1910 04/18/17 2330 04/19/17 0330 04/19/17 0705   BP: 139/67 140/65 132/70 144/70   Pulse: 98 89 91 89   Resp: 20 20 18 17   Temp: 98.8 °F (37.1 °C) 98.3 °F (36.8 °C) 97.9 °F (36.6 °C) 97.9 °F (36.6 °C)   SpO2: 95% 92% 94% 95%   Weight:       Height:             Pain Assessment    Patient with c/o mild abdominal pain. 4 on 0-10 scale. RN, Beatriz Cho, notified of patient's request for a pain pill. Previous Outreach assessment has been reviewed. There have been no significant clinical changes since the completion of the last dated Outreach assessment. Will continue to follow up per outreach protocol.     Signed By:   Tricia Porras RN    April 19, 2017 10:21 AM

## 2017-04-19 NOTE — PROGRESS NOTES
GI DAILY PROGRESS NOTE    Admit Date:  4/15/2017    Today's Date:  4/19/2017    CC:  Bile leak    Subjective:     Patient s/p ERCP with stent placement 4/16/17 for bile leak. Also has two drains in place in abdomen with 175ml output over the last day (700 mL<---1700ml). Denies nausea, vomiting, abdominal pain. +flatus and passed a small BM. Medications:   Current Facility-Administered Medications   Medication Dose Route Frequency    vancomycin (VANCOCIN) 750 mg in  ml infusion  750 mg IntraVENous Q12H    furosemide (LASIX) injection 20 mg  20 mg IntraVENous ONCE    NUTRITIONAL SUPPORT ELECTROLYTE PRN ORDERS   Does Not Apply PRN    diphenhydrAMINE (BENADRYL) injection 25 mg  25 mg IntraVENous QHS PRN    diphenhydrAMINE (BENADRYL) injection 50 mg  50 mg IntraVENous QHS PRN    piperacillin-tazobactam (ZOSYN) 3.375 g in 0.9% sodium chloride (MBP/ADV) 100 mL  3.375 g IntraVENous Q8H    morphine injection 4 mg  4 mg IntraVENous Q3H PRN    morphine injection 2 mg  2 mg IntraVENous Q3H PRN    morphine injection 3 mg  3 mg IntraVENous Q3H PRN    oxyCODONE-acetaminophen (PERCOCET) 5-325 mg per tablet 1 Tab  1 Tab Oral Q4H PRN    sodium chloride (NS) flush 5-10 mL  5-10 mL IntraVENous PRN    lactated ringers infusion  75 mL/hr IntraVENous CONTINUOUS    ondansetron (ZOFRAN) injection 4 mg  4 mg IntraVENous Q4H PRN    famotidine (PF) (PEPCID) injection 20 mg  20 mg IntraVENous DAILY    [START ON 4/20/2017] levothyroxine (SYNTHROID) injection 37.5 mcg  37.5 mcg IntraVENous Q24H       Review of Systems:  No significant dyspnea.   No CP    Diet:  clears    Objective:   Vitals:  Visit Vitals    /70    Pulse 89    Temp 97.9 °F (36.6 °C)    Resp 17    Ht 5' 5\" (1.651 m)    Wt 76.6 kg (168 lb 14 oz)    SpO2 95%    Breastfeeding No    BMI 28.1 kg/m2     Intake/Output:  04/19 0701 - 04/19 1900  In: 1019 [I.V.:1019]  Out: 0   04/17 1901 - 04/19 0700  In: 5198.6 [P.O.:960; I.V.:3603.6]  Out: 2309 [JQTNK:3296; Drains:760]  Exam:  General appearance: alert, cooperative, no distress  Lungs: clear  Heart: regular rate and rhythm  Abdomen: soft, non-tender. Bowel sounds normal. Bilateral abdominal drains in place with dark green bile  Extremities: No PHILLIP bilaterally. Right hand with some edema--?infiltrated IV  Neuro:  alert and oriented    Data Review (Labs):    Recent Labs      04/19/17   0533  04/18/17   0455  04/17/17   0336   WBC  7.6  10.3  15.8*   HGB  10.8*  7.0*  8.3*   HCT  31.7*  21.7*  25.5*   PLT  560*  609*  603*   MCV  80.5  81.6  82.3   NA  145  145  142   K  3.6  3.3*  3.8   CL  113*  112*  110*   CO2  21  20*  20*   BUN  18  26*  44*   CREA  0.81  1.07*  1.75*   CA  7.6*  7.9*  8.0*   MG   --    --   1.7*   GLU  89  87  103*   AP   --    --   112   SGOT   --    --   43*   ALT   --    --   31   TBILI   --    --   1.2*   CBIL   --    --   0.8*   ALB   --    --   1.7*   TP   --    --   5.3*       Assessment:     Principal Problem:    Severe sepsis with septic shock (HCC) (4/15/2017)    Active Problems:    Ascites (4/15/2017)      Abdominal distension (4/15/2017)      Hypotension (4/15/2017)      S/P laparoscopic cholecystectomy (4/15/2017)      Overview: 3 weeks ago by Dr. Karla Marks      OMERO (acute kidney injury) ([facility] (4/15/2017)      Leukocytosis (4/15/2017)      Protein calorie malnutrition ([facility] (4/15/2017)      Overview: With Albumin < 2 currently. Anemia (4/16/2017)      69 y/o female admitted with septic shock secondary to bile leak s/p lap patriec. S/p ERCP with stent placement 4/16/17, on zosyn and vanco with improvement in WBC count, bili and alk phos (ALT and AST 31 and 43 respectively on 4/17/17.)  Abdominal fluid culture with GPC (final ID pending), and patient's hgb up to 10 after 2 units PRBCs 4/18. Plan:   1) continue abx  2) BREE output has decreased.   Defer to IR when drains can be pulled  3) will need EGD with stent removal in 6-8 weeks  4) patient states PT has been ordered  5) would discontinue IVFs if ok with primary team.  Her net I/O this admit is +[phone]. Patient is seen and examined in collaboration with Dr. Jamila Barrera. Assessment and plan as per Dr. Chan Ibarra.   JOSE Quach     I have seen and examined the patient, and agree with above

## 2017-04-19 NOTE — PROGRESS NOTES
Joel Unger  Admission Date: 4/15/2017             Daily Progress Note: 4/19/2017    The patient's chart is reviewed and the patient is discussed with the staff. Admitted with recent history of lap patrice. She was hypotensive with septic shock. She underwent ERCP with a noted cystic duct leak. Stent placed (plan to remove in 8 - 10 weeks) and CBD stone removed. Peritoneal drains placed and remain. On abx - zosyn and vanc. Enterococcus Faecalis in the urine, blood cultures neg and body fluid culture pending (+GPCs). She has been advanced to a full liquid diet. Positive fluid balance/edema - started on lasix today. Subjective:    Some dyspnea. Noted edema. Weak. Having small stools. Just took pain medication for abdominal pain. Sat up for 3 hours.      Current Facility-Administered Medications   Medication Dose Route Frequency    vancomycin (VANCOCIN) 750 mg in  ml infusion  750 mg IntraVENous Q12H    NUTRITIONAL SUPPORT ELECTROLYTE PRN ORDERS   Does Not Apply PRN    diphenhydrAMINE (BENADRYL) injection 25 mg  25 mg IntraVENous QHS PRN    diphenhydrAMINE (BENADRYL) injection 50 mg  50 mg IntraVENous QHS PRN    piperacillin-tazobactam (ZOSYN) 3.375 g in 0.9% sodium chloride (MBP/ADV) 100 mL  3.375 g IntraVENous Q8H    morphine injection 4 mg  4 mg IntraVENous Q3H PRN    morphine injection 2 mg  2 mg IntraVENous Q3H PRN    morphine injection 3 mg  3 mg IntraVENous Q3H PRN    oxyCODONE-acetaminophen (PERCOCET) 5-325 mg per tablet 1 Tab  1 Tab Oral Q4H PRN    sodium chloride (NS) flush 5-10 mL  5-10 mL IntraVENous PRN    lactated ringers infusion  75 mL/hr IntraVENous CONTINUOUS    ondansetron (ZOFRAN) injection 4 mg  4 mg IntraVENous Q4H PRN    famotidine (PF) (PEPCID) injection 20 mg  20 mg IntraVENous DAILY    [START ON 4/20/2017] levothyroxine (SYNTHROID) injection 37.5 mcg  37.5 mcg IntraVENous Q24H         Objective:     Vitals:    04/18/17 1910 04/18/17 2330 04/19/17 0330 04/19/17 0705   BP: 139/67 140/65 132/70 144/70   Pulse: 98 89 91 89   Resp: 20 20 18 17   Temp: 98.8 °F (37.1 °C) 98.3 °F (36.8 °C) 97.9 °F (36.6 °C) 97.9 °F (36.6 °C)   SpO2: 95% 92% 94% 95%   Weight:       Height:         Intake and Output:   04/17 1901 - 04/19 0700  In: 5198.6 [P.O.:960; I.V.:3603.6]  Out: 2235 [Urine:1475; Drains:760]  04/19 0701 - 04/19 1900  In: 1019 [I.V.:1019]  Out: 0     Physical Exam:   Constitutional:  the patient is well developed and in no acute distress  HEENT:  Sclera clear, pupils equal, oral mucosa moist  Lungs: clear bilaterally. On room air  Cardiovascular:  RRR without M,G,R  Abd/GI: distended and tender; with positive bowel sounds. Ext: warm without cyanosis. There is no lower leg edema but both or her arms and hands are swollen. Musculoskeletal: moves all four extremities with equal strength  Skin:  no jaundice or rashes, no wounds. Bilateral abdominal drains  Neuro: no gross neuro deficits   Musculoskeletal: can ambulate. No deformity  Psychiatric: Calm.      ROS:  Some pain, weak, some dyspnea  Lines: peripheral IV, abdominal drains    CHEST XRAY:       LAB  Recent Labs      04/19/17   0533  04/18/17   0455  04/17/17   0336   WBC  7.6  10.3  15.8*   HGB  10.8*  7.0*  8.3*   HCT  31.7*  21.7*  25.5*   PLT  560*  609*  603*     Recent Labs      04/19/17   0533  04/18/17   0455  04/17/17   0336   NA  145  145  142   K  3.6  3.3*  3.8   CL  113*  112*  110*   CO2  21  20*  20*   GLU  89  87  103*   BUN  18  26*  44*   CREA  0.81  1.07*  1.75*   MG   --    --   1.7*   PHOS   --    --   4.1*   ALB   --    --   1.7*   SGOT   --    --   43*     Recent Labs      04/16/17   1510   PH  7.37   PCO2  28*   PO2  96   HCO3  16*         Assessment:  (Medical Decision Making)     Hospital Problems  Date Reviewed: 4/18/2017          Codes Class Noted POA    Anemia ICD-10-CM: D64.9  ICD-9-CM: 285.9  4/16/2017 Unknown    improved    * (Principal)Severe sepsis with septic shock [facility]) ICD-10-CM: A41.9, R65.21  ICD-9-CM: 038.9, 995.92, 785.52  4/15/2017 Yes    S/p ERCP - cystic duct leak/stone - now with stent. On abx - body fluid cx pending. Enterococcus in urine    Ascites ICD-10-CM: R18.8  ICD-9-CM: 789.59  4/15/2017 Yes    Drains remain    Abdominal distension ICD-10-CM: R14.0  ICD-9-CM: 787.3  4/15/2017 Yes    Drains remain - decreased output    Hypotension ICD-10-CM: I95.9  ICD-9-CM: 458.9  4/15/2017 Yes    resolved    S/P laparoscopic cholecystectomy ICD-10-CM: Z90.49  ICD-9-CM: V45.89  4/15/2017 Yes    Overview Signed 4/15/2017  9:07 PM by Tam Horne, DO     3 weeks ago by Dr. Heidi Aguirre             OMERO (acute kidney injury) [facility]) ICD-10-CM: N17.9  ICD-9-CM: 584.9  4/15/2017 Yes    resolved    Leukocytosis ICD-10-CM: X48.299  ICD-9-CM: 288.60  4/15/2017 Yes    resolved    Protein calorie malnutrition ([facility] ICD-10-CM: E46  ICD-9-CM: 263.9  4/15/2017 Unknown    Overview Signed 4/15/2017 11:48 PM by Frankey Frame, MD     With Albumin < 2 currently. Poor intake for about 6 weeks now          Plan:  (Medical Decision Making)   1. Diet advanced to full liquids today. Stop IV fluids - + balance - lasix ordered, repeat prn  2. Body fluid culture pending - + GPCs. Enterococcus in urine. On Vanc and Zosyn  3. Peritoneal drains remain - 5 mls out from left, 175 mls from right - GI/surgery following  4. Home meds reviewed - BS controlled so Glucophage on hold, Lotensin on hold and blood pressure currently acceptable (renal function now normal and hypotension resolved). Will restart Synthroid  5. Has been up for 3 hours today - SCD hose ordered for DVT prophylaxis  6. Will stop daily labs - order prn    Vanesa Al NP    More than 50% of time documented was spent face-to-face contact with the patient and in the care of the patient on the floor/unit where the patient is located.    Lungs:  clear  Heart:  RRR with no Murmur/Rubs/Gallops, 1+ edema    Additional Comments:  Still on vanc and zosyn- check procal    I have spoken with and examined the patient. I agree with the above assessment and plan as documented.     Jakub Andrade MD

## 2017-04-19 NOTE — PROGRESS NOTES
During Bedside Report, patient requested something to help her sleep that was not pain medication, spoke to Dr. Scot Valerio, the surgeon on call, given verbal orders for PRN benadryl.

## 2017-04-19 NOTE — PROGRESS NOTES
General Surgery Progress Note    4/19/2017    Admit Date: 4/15/2017    Subjective:     Surgery  Resting in recliner. Feels better today. Denies pain. Reports abd still feels distended. Feels \"puffy\" and mildly SOB after receiving PRBCs yesterday. BP stabilized. UOP adequate and Cr baseline. AF, VSS.      Objective:     Visit Vitals    /70    Pulse 89    Temp 97.9 °F (36.6 °C)    Resp 17    Ht 5' 5\" (1.651 m)    Wt 76.6 kg (168 lb 14 oz)    SpO2 95%    Breastfeeding No    BMI 28.1 kg/m2         Intake/Output Summary (Last 24 hours) at 04/19/17 0811  Last data filed at 04/19/17 0705   Gross per 24 hour   Intake           4258.5 ml   Output             1130 ml   Net           3128.5 ml        EXAM:    CV: RRR, S1S2, no murmur, normal perfusion, 1+ peripheral edema  LUNG: Unlabored, mildly shallow effort, no wheezing, BBS clear with diminished bases  ABD -- still with some distention, but softer than yesterday, non-tender, active BS, drains x2 with bilious material (L--5cc, R--175cc)         Data Review    Recent Results (from the past 24 hour(s))   TYPE & CROSSMATCH    Collection Time: 04/18/17  9:10 AM   Result Value Ref Range    Crossmatch Expiration 04/21/2017     ABO/Rh(D) A POSITIVE     Antibody screen NEG     Unit number [id number]     Blood component type  LR AS5     Unit division 00     Status of unit TRANSFUSED     Crossmatch result Compatible     Unit number [id number]     Blood component type [facility] AS5     Unit division 00     Status of unit TRANSFUSED     Crossmatch result Compatible    VANCOMYCIN, TROUGH    Collection Time: 04/19/17 12:20 AM   Result Value Ref Range    Vancomycin,trough 11.9 5 - 20 ug/mL   CBC WITH AUTOMATED DIFF    Collection Time: 04/19/17  5:33 AM   Result Value Ref Range    WBC 7.6 4.3 - 11.1 K/uL    RBC 3.94 (L) 4.05 - 5.25 M/uL    HGB 10.8 (L) 11.7 - 15.4 g/dL    HCT 31.7 (L) 35.8 - 46.3 %    MCV 80.5 79.6 - 97.8 FL    MCH 27.4 26.1 - 32.9 PG    MCHC 34.1 31.4 - 35.0 g/dL    RDW 13.8 11.9 - 14.6 %    PLATELET 691 (H) 749 - 450 K/uL    MPV 9.1 (L) 10.8 - 14.1 FL    DF AUTOMATED      NEUTROPHILS 73 43 - 78 %    LYMPHOCYTES 11 (L) 13 - 44 %    MONOCYTES 9 4.0 - 12.0 %    EOSINOPHILS 7 0.5 - 7.8 %    BASOPHILS 0 0.0 - 2.0 %    IMMATURE GRANULOCYTES 0.3 0.0 - 5.0 %    ABS. NEUTROPHILS 5.5 1.7 - 8.2 K/UL    ABS. LYMPHOCYTES 0.9 0.5 - 4.6 K/UL    ABS. MONOCYTES 0.7 0.1 - 1.3 K/UL    ABS. EOSINOPHILS 0.6 0.0 - 0.8 K/UL    ABS. BASOPHILS 0.0 0.0 - 0.2 K/UL    ABS. IMM.  GRANS. 0.0 0.0 - 0.5 K/UL   METABOLIC PANEL, BASIC    Collection Time: 04/19/17  5:33 AM   Result Value Ref Range    Sodium 145 136 - 145 mmol/L    Potassium 3.6 3.5 - 5.1 mmol/L    Chloride 113 (H) 98 - 107 mmol/L    CO2 21 21 - 32 mmol/L    Anion gap 11 7 - 16 mmol/L    Glucose 89 65 - 100 mg/dL    BUN 18 8 - 23 MG/DL    Creatinine 0.81 0.6 - 1.0 MG/DL    GFR est AA >60 >60 ml/min/1.73m2    GFR est non-AA >60 >60 ml/min/1.73m2    Calcium 7.6 (L) 8.3 - 10.4 MG/DL        Hospital Problems  Date Reviewed: 4/18/2017          Codes Class Noted POA    Anemia ICD-10-CM: D64.9  ICD-9-CM: 285.9  4/16/2017 Unknown        * (Principal)Severe sepsis with septic shock ([facility] ICD-10-CM: A41.9, R65.21  ICD-9-CM: 038.9, 995.92, 785.52  4/15/2017 Yes        Ascites ICD-10-CM: R18.8  ICD-9-CM: 789.59  4/15/2017 Yes        Abdominal distension ICD-10-CM: R14.0  ICD-9-CM: 787.3  4/15/2017 Yes        Hypotension ICD-10-CM: I95.9  ICD-9-CM: 458.9  4/15/2017 Yes        S/P laparoscopic cholecystectomy ICD-10-CM: Z90.49  ICD-9-CM: V45.89  4/15/2017 Yes    Overview Signed 4/15/2017  9:07 PM by Luz Guzman DO     3 weeks ago by Dr. Caity Lozano             OMERO (acute kidney injury) [facility]) ICD-10-CM: N17.9  ICD-9-CM: 584.9  4/15/2017 Yes        Leukocytosis ICD-10-CM: U28.634  ICD-9-CM: 288.60  4/15/2017 Yes        Protein calorie malnutrition ([facility] ICD-10-CM: E46  ICD-9-CM: 263.9  4/15/2017 Unknown    Overview Signed 4/15/2017 11:48 PM by Jarvis Ramos MD     With Albumin < 2 currently.                   PLAN:  Lasix x1 dose today - will monitor response  Diet to FLD  Remains on Abx -- Zosyn/Vanc -- WBCs 7.6 today  OOB as tolerated/Ambulate -- PT following  Follow labs/lytes -- supplement PRN    Osmin Patterson, NP-C

## 2017-04-19 NOTE — PROGRESS NOTES
Pharmacokinetic Consult to Pharmacist    Max Rodolfo is a 68 y.o. female being treated for possible intra-abdominal infection with vancomycin and pip-tazo. Has ascites - S/P Lap Cholecystectomy about 3 weeks ago. The patient underwent ERCP with stent placement on 4/16/17. Height: 5' 5\" (165.1 cm)  Weight: 76.6 kg (168 lb 14 oz)  Lab Results   Component Value Date/Time    BUN 18 04/19/2017 05:33 AM    Creatinine 0.81 04/19/2017 05:33 AM    WBC 7.6 04/19/2017 05:33 AM    Procalcitonin 1.1 04/16/2017 12:30 AM      Estimated Creatinine Clearance: 60.4 mL/min (based on Cr of 0.81). CULTURES:  4/15   Urine   MSF, E. faecalis, final     Blood X 2  NGTD, prelim     MRSA screen  Negative     Lab Results   Component Value Date/Time    Vancomycin,trough 11.9 04/19/2017 12:20 AM       Day 4 of vancomycin. Goal trough is 15 - 20 . Renal function now about at baseline. Trough was low. Dose was increased from 1000 mg IV q24h to 750 mg IV q12h. Plan to check a steady state level on new regimen as indicated. Pharmacy will continue to follow. Please call with any questions.       Thank you,  Gregory Lantigua, PharmD  Clinical Pharmacist  [phone]

## 2017-04-20 ENCOUNTER — HOME HEALTH ADMISSION (OUTPATIENT)
Dept: HOME HEALTH SERVICES | Facility: HOME HEALTH | Age: 77
End: 2017-04-20
Payer: MEDICARE

## 2017-04-20 PROBLEM — R60.0 EDEMA OF BOTH LEGS: Status: ACTIVE | Noted: 2017-04-20

## 2017-04-20 LAB
PROCALCITONIN SERPL-MCNC: 0.6 NG/ML
VANCOMYCIN TROUGH SERPL-MCNC: 24.8 UG/ML (ref 5–20)

## 2017-04-20 PROCEDURE — 65270000029 HC RM PRIVATE

## 2017-04-20 PROCEDURE — 74011250637 HC RX REV CODE- 250/637: Performed by: SURGERY

## 2017-04-20 PROCEDURE — 74011250636 HC RX REV CODE- 250/636: Performed by: INTERNAL MEDICINE

## 2017-04-20 PROCEDURE — 80202 ASSAY OF VANCOMYCIN: CPT | Performed by: INTERNAL MEDICINE

## 2017-04-20 PROCEDURE — 74011250637 HC RX REV CODE- 250/637: Performed by: NURSE PRACTITIONER

## 2017-04-20 PROCEDURE — 99232 SBSQ HOSP IP/OBS MODERATE 35: CPT | Performed by: INTERNAL MEDICINE

## 2017-04-20 PROCEDURE — 74011250636 HC RX REV CODE- 250/636: Performed by: SURGERY

## 2017-04-20 PROCEDURE — 36415 COLL VENOUS BLD VENIPUNCTURE: CPT | Performed by: INTERNAL MEDICINE

## 2017-04-20 PROCEDURE — 84145 PROCALCITONIN (PCT): CPT | Performed by: INTERNAL MEDICINE

## 2017-04-20 PROCEDURE — 74011000258 HC RX REV CODE- 258: Performed by: INTERNAL MEDICINE

## 2017-04-20 PROCEDURE — 74011250636 HC RX REV CODE- 250/636: Performed by: NURSE PRACTITIONER

## 2017-04-20 RX ORDER — SODIUM CHLORIDE, SODIUM LACTATE, POTASSIUM CHLORIDE, CALCIUM CHLORIDE 600; 310; 30; 20 MG/100ML; MG/100ML; MG/100ML; MG/100ML
75 INJECTION, SOLUTION INTRAVENOUS CONTINUOUS
Status: DISCONTINUED | OUTPATIENT
Start: 2017-04-20 | End: 2017-04-20

## 2017-04-20 RX ORDER — FUROSEMIDE 10 MG/ML
20 INJECTION INTRAMUSCULAR; INTRAVENOUS ONCE
Status: COMPLETED | OUTPATIENT
Start: 2017-04-20 | End: 2017-04-20

## 2017-04-20 RX ADMIN — FUROSEMIDE 20 MG: 10 INJECTION, SOLUTION INTRAMUSCULAR; INTRAVENOUS at 17:28

## 2017-04-20 RX ADMIN — ZOLPIDEM TARTRATE 5 MG: 5 TABLET ORAL at 22:49

## 2017-04-20 RX ADMIN — FUROSEMIDE 20 MG: 10 INJECTION, SOLUTION INTRAMUSCULAR; INTRAVENOUS at 09:46

## 2017-04-20 RX ADMIN — OXYCODONE HYDROCHLORIDE AND ACETAMINOPHEN 1 TABLET: 5; 325 TABLET ORAL at 05:51

## 2017-04-20 RX ADMIN — OXYCODONE HYDROCHLORIDE AND ACETAMINOPHEN 1 TABLET: 5; 325 TABLET ORAL at 11:49

## 2017-04-20 RX ADMIN — SODIUM CHLORIDE, SODIUM LACTATE, POTASSIUM CHLORIDE, AND CALCIUM CHLORIDE 75 ML/HR: 600; 310; 30; 20 INJECTION, SOLUTION INTRAVENOUS at 00:30

## 2017-04-20 RX ADMIN — VANCOMYCIN HYDROCHLORIDE 750 MG: 10 INJECTION, POWDER, LYOPHILIZED, FOR SOLUTION INTRAVENOUS at 01:27

## 2017-04-20 RX ADMIN — FAMOTIDINE 20 MG: 20 TABLET ORAL at 07:57

## 2017-04-20 RX ADMIN — PIPERACILLIN SODIUM,TAZOBACTAM SODIUM 3.38 G: 3; .375 INJECTION, POWDER, FOR SOLUTION INTRAVENOUS at 07:55

## 2017-04-20 RX ADMIN — PIPERACILLIN SODIUM,TAZOBACTAM SODIUM 3.38 G: 3; .375 INJECTION, POWDER, FOR SOLUTION INTRAVENOUS at 00:25

## 2017-04-20 RX ADMIN — FAMOTIDINE 20 MG: 20 TABLET ORAL at 17:28

## 2017-04-20 RX ADMIN — LEVOTHYROXINE SODIUM 75 MCG: 75 TABLET ORAL at 06:52

## 2017-04-20 RX ADMIN — VANCOMYCIN HYDROCHLORIDE 750 MG: 10 INJECTION, POWDER, LYOPHILIZED, FOR SOLUTION INTRAVENOUS at 12:51

## 2017-04-20 RX ADMIN — PIPERACILLIN SODIUM,TAZOBACTAM SODIUM 3.38 G: 3; .375 INJECTION, POWDER, FOR SOLUTION INTRAVENOUS at 17:28

## 2017-04-20 NOTE — PROGRESS NOTES
[address] [facility]  Face to Face Encounter    Patients Name: Keesha Sanchez    YOB: 1940    Ordering Physician: Dr. Frederick Cherry MD     Primary Diagnosis: Sepsis [facility])  Bile leak    Date of Face to Face:   4/20/2017                                  Face to Face Encounter findings are related to primary reason for home care:   yes. 1. I certify that the patient needs intermittent care as follows: skilled nursing care:  teaching/training of new meds, surgical drains  physical therapy: strengthening, transfer training, gait/stair training and pt/caregiver education    2. I certify that this patient is homebound, that is: 1) patient requires the use of a walker device, special transportation, or assistance of another to leave the home; or 2) patient's condition makes leaving the home medically contraindicated; and 3) patient has a normal inability to leave the home and leaving the home requires considerable and taxing effort. Patient may leave the home for infrequent and short duration for medical reasons, and occasional absences for non-medical reasons. Homebound status is due to the following functional limitations: Patient currently under activity restrictions secondary to recent surgical procedure, this hinders their ability to safely leave the home. 3. I certify that this patient is under my care and that I, or a nurse practitioner or [address] [place] [address], or clinical nurse specialist, or certified nurse midwife, working with me, had a Face-to-Face Encounter that meets the physician Face-to-Face Encounter requirements.   The following are the clinical findings from the [address] [place] [address] encounter that support the need for skilled services and is a summary of the encounter: see discharge summary/progress notes/chart    See discharge summary/progress notes/chart      Noemí Langford RN  4/20/2017      THE FOLLOWING TO BE COMPLETED BY THE COMMUNITY PHYSICIAN:    I concur with the findings described above from the F2F encounter that this patient is homebound and in need of a skilled service.     Certifying Physician: _____________________________________      Printed Certifying Physician Name: _____________________________________    Date: _________________

## 2017-04-20 NOTE — PROGRESS NOTES
Claude Cabrera  Admission Date: 4/15/2017             Daily Progress Note: 4/20/2017    The patient's chart is reviewed and the patient is discussed with the staff. Subjective:     69 y/o Admitted with recent history of lap patrice. She was hypotensive with septic shock. She underwent ERCP with a noted cystic duct leak. Stent placed (plan to remove in 8 - 10 weeks) and CBD stone removed. Peritoneal drains placed and remain. On abx - zosyn and vanc. Enterococcus Faecalis in the urine, blood cultures neg and body fluid culture pending (+GPCs). She has been advanced to a full liquid diet.  Positive fluid balance/edema - started on lasix today    Current Facility-Administered Medications   Medication Dose Route Frequency    vancomycin (VANCOCIN) 750 mg in  ml infusion  750 mg IntraVENous Q12H    levothyroxine (SYNTHROID) tablet 75 mcg  75 mcg Oral ACB    famotidine (PEPCID) tablet 20 mg  20 mg Oral BID    oxyCODONE-acetaminophen (PERCOCET) 5-325 mg per tablet 2 Tab  2 Tab Oral Q4H PRN    oxyCODONE-acetaminophen (PERCOCET) 5-325 mg per tablet 1 Tab  1 Tab Oral Q4H PRN    zolpidem (AMBIEN) tablet 5 mg  5 mg Oral QHS PRN    NUTRITIONAL SUPPORT ELECTROLYTE PRN ORDERS   Does Not Apply PRN    diphenhydrAMINE (BENADRYL) injection 25 mg  25 mg IntraVENous QHS PRN    diphenhydrAMINE (BENADRYL) injection 50 mg  50 mg IntraVENous QHS PRN    piperacillin-tazobactam (ZOSYN) 3.375 g in 0.9% sodium chloride (MBP/ADV) 100 mL  3.375 g IntraVENous Q8H    morphine injection 4 mg  4 mg IntraVENous Q3H PRN    morphine injection 2 mg  2 mg IntraVENous Q3H PRN    morphine injection 3 mg  3 mg IntraVENous Q3H PRN    oxyCODONE-acetaminophen (PERCOCET) 5-325 mg per tablet 1 Tab  1 Tab Oral Q4H PRN    sodium chloride (NS) flush 5-10 mL  5-10 mL IntraVENous PRN    ondansetron (ZOFRAN) injection 4 mg  4 mg IntraVENous Q4H PRN       Review of Systems    Constitutional: negative for fever, chills, sweats  Cardiovascular: negative for chest pain, palpitations, syncope, +for edema  Gastrointestinal:  negative for dysphagia, reflux, vomiting, diarrhea, abdominal pain, or melena  Neurologic:  negative for focal weakness, numbness, headache    Objective:     Vitals:    04/19/17 2310 04/20/17 0307 04/20/17 0717 04/20/17 1038   BP: 145/71 147/80 136/65 138/68   Pulse: 96 97 88 96   Resp: 17 17 17 17   Temp: 98.2 °F (36.8 °C) 98.4 °F (36.9 °C) 97.8 °F (36.6 °C) 98.1 °F (36.7 °C)   SpO2: 98% 93% 95% 97%   Weight:       Height:         Intake and Output:   04/18 1901 - 04/20 0700  In: 1723 [I.V.:1723]  Out: 1830 [QWLHF:4310; Drains:455]  04/20 0701 - 04/20 1900  In: 815 [I.V.:751]  Out: 875 [Urine:620; Drains:255]    Physical Exam:   Constitution:  the patient is well developed and in no acute distress  EENMT:  Sclera clear, pupils equal, oral mucosa moist  Respiratory: clear  Cardiovascular:  RRR without M,G,R  Gastrointestinal: soft and non-tender; with positive bowel sounds. Musculoskeletal: warm without cyanosis. There is 2+ lower leg edema. Skin:  no jaundice or rashes, no wounds   Neurologic: no gross neuro deficits     Psychiatric:  alert and oriented x 3    CHEST XRAY:       LAB  No results for input(s): GLUCPOC in the last 72 hours. No lab exists for component: [place]   Recent Labs      04/19/17   0533  04/18/17   0455   WBC  7.6  10.3   HGB  10.8*  7.0*   HCT  31.7*  21.7*   PLT  560*  609*     Recent Labs      04/19/17   0533  04/18/17   0455   NA  145  145   K  3.6  3.3*   CL  113*  112*   CO2  21  20*   GLU  89  87   BUN  18  26*   CREA  0.81  1.07*   CA  7.6*  7.9*     No results for input(s): PH, PCO2, PO2, HCO3 in the last 72 hours. No results for input(s): LCAD, LAC in the last 72 hours.       Assessment:  (Medical Decision Making)     Hospital Problems  Date Reviewed: 4/18/2017          Codes Class Noted POA    Edema of both legs ICD-10-CM: R60.0  ICD-9-CM: 782.3  4/20/2017 Unknown    No better Anemia ICD-10-CM: D64.9  ICD-9-CM: 285.9  4/16/2017 Yes        * (Principal)Severe sepsis with septic shock ([facility] ICD-10-CM: A41.9, R65.21  ICD-9-CM: 038.9, 995.92, 785.52  4/15/2017 Yes    improved    Ascites ICD-10-CM: R18.8  ICD-9-CM: 789.59  4/15/2017 Yes        Abdominal distension ICD-10-CM: R14.0  ICD-9-CM: 787.3  4/15/2017 Yes        Hypotension ICD-10-CM: I95.9  ICD-9-CM: 458.9  4/15/2017 Yes        S/P laparoscopic cholecystectomy ICD-10-CM: Z90.49  ICD-9-CM: V45.89  4/15/2017 Yes    Overview Signed 4/15/2017  9:07 PM by Dary Henson DO     3 weeks ago by Dr. Jimi Piper             OMERO (acute kidney injury) [facility]) ICD-10-CM: N17.9  ICD-9-CM: 584.9  4/15/2017 Yes    stable    Protein calorie malnutrition ([facility] ICD-10-CM: E46  ICD-9-CM: 263.9  4/15/2017 Yes    Overview Signed 4/15/2017 11:48 PM by Guy Pryor MD     With Albumin < 2 currently. 1.7          Plan:  (Medical Decision Making)   1   Lasix repeat dose this pm  2   May need albumin if no better tomorrow with edema  3    Check lab in am--    More than 50% of the time documented was spent in face-to-face contact with the patient and in the care of the patient on the floor/unit where the patient is located.     Mishel Doty MD

## 2017-04-20 NOTE — PROGRESS NOTES
Attempted PT treatment several times but patient has been tied up most of the day with other services. She reports she has been up and down so many times today secondary to the medication they have her. She requested to not have PT today and try again tomorrow.     Rachel Pruett, ANNALISAT

## 2017-04-20 NOTE — PROGRESS NOTES
GI DAILY PROGRESS NOTE    Admit Date:  4/15/2017    Today's Date:  4/20/2017    CC:  Bile leak    Subjective:     Patient s/p ERCP with stent placement 4/16/17 for bile leak. Also has two drains in place in abdomen with 335ml output over the last day (175ml<---700 mL<---1700ml). Denies nausea, vomiting, abdominal pain. +flatus. Feels \"puffy\". Orders written to discontinue IVFs, but they are still running at 125 ml/hr. Second dose of lasix ordered and surgery placed second order to discontinue IVFs. Medications:   Current Facility-Administered Medications   Medication Dose Route Frequency    furosemide (LASIX) injection 20 mg  20 mg IntraVENous ONCE    vancomycin (VANCOCIN) 750 mg in  ml infusion  750 mg IntraVENous Q12H    levothyroxine (SYNTHROID) tablet 75 mcg  75 mcg Oral ACB    famotidine (PEPCID) tablet 20 mg  20 mg Oral BID    oxyCODONE-acetaminophen (PERCOCET) 5-325 mg per tablet 2 Tab  2 Tab Oral Q4H PRN    oxyCODONE-acetaminophen (PERCOCET) 5-325 mg per tablet 1 Tab  1 Tab Oral Q4H PRN    zolpidem (AMBIEN) tablet 5 mg  5 mg Oral QHS PRN    NUTRITIONAL SUPPORT ELECTROLYTE PRN ORDERS   Does Not Apply PRN    diphenhydrAMINE (BENADRYL) injection 25 mg  25 mg IntraVENous QHS PRN    diphenhydrAMINE (BENADRYL) injection 50 mg  50 mg IntraVENous QHS PRN    piperacillin-tazobactam (ZOSYN) 3.375 g in 0.9% sodium chloride (MBP/ADV) 100 mL  3.375 g IntraVENous Q8H    morphine injection 4 mg  4 mg IntraVENous Q3H PRN    morphine injection 2 mg  2 mg IntraVENous Q3H PRN    morphine injection 3 mg  3 mg IntraVENous Q3H PRN    oxyCODONE-acetaminophen (PERCOCET) 5-325 mg per tablet 1 Tab  1 Tab Oral Q4H PRN    sodium chloride (NS) flush 5-10 mL  5-10 mL IntraVENous PRN    ondansetron (ZOFRAN) injection 4 mg  4 mg IntraVENous Q4H PRN       Review of Systems:  No significant dyspnea. No CP    Diet:  GI soft.     Objective:   Vitals:  Visit Vitals    /65    Pulse 88    Temp 97.8 °F (36.6 °C)    Resp 17    Ht 5' 5\" (1.651 m)    Wt 75 kg (165 lb 6.4 oz)    SpO2 95%    Breastfeeding No    BMI 27.52 kg/m2     Intake/Output:  04/20 0701 - 04/20 1900  In: 391 [I.V.:391]  Out: -   04/18 1901 - 04/20 0700  In: 1723 [I.V.:1723]  Out: 31 75 62 [UEBBO:2717; Drains:455]  Exam:  General appearance: alert, cooperative, no distress  Lungs: clear  Heart: regular rate and rhythm  Abdomen: soft, mildly distended and non-tender. Bowel sounds normal. Bilateral abdominal drains in place with dark green bile  Extremities: trace PHILLIP bilaterally. Right hand with mild fransisco  Neuro:  alert and oriented    Data Review (Labs):    Recent Labs      04/19/17   0533  04/18/17   0455   WBC  7.6  10.3   HGB  10.8*  7.0*   HCT  31.7*  21.7*   PLT  560*  609*   MCV  80.5  81.6   NA  145  145   K  3.6  3.3*   CL  113*  112*   CO2  21  20*   BUN  18  26*   CREA  0.81  1.07*   CA  7.6*  7.9*   GLU  89  87       Assessment:     Principal Problem:    Severe sepsis with septic shock ([facility] (4/15/2017)    Active Problems:    Ascites (4/15/2017)      Abdominal distension (4/15/2017)      Hypotension (4/15/2017)      S/P laparoscopic cholecystectomy (4/15/2017)      Overview: 3 weeks ago by Dr. Pam Ríos      OMERO (acute kidney injury) ([facility] (4/15/2017)      Protein calorie malnutrition ([facility] (4/15/2017)      Overview: With Albumin < 2 currently. Anemia (4/16/2017)      69 y/o female admitted with septic shock secondary to bile leak s/p lap patrice. S/p ERCP with stent placement 4/16/17, on zosyn and vanco with improvement in WBC count, bili and alk phos (ALT and AST 31 and 43 respectively on 4/17/17.)  Abdominal fluid culture with GPC (final ID pending), and patient's hgb up to 10 after 2 units PRBCs 4/18.     Plan:   1) continue abx  2) BREE output decreasing  Defer to IR when drains can be pulled  3) will need EGD with stent removal in 6-8 weeks  4) IVFs discontinued by primary team  5) we will sign off and arrange outpatient follow up in 2-4 weeks (to arrange EGD/stent removal). Our office will call nathanael with appointment date and time. Please call with questions/concerns    Patient is seen and examined in collaboration with Dr. Kala Hawthorne. Assessment and plan as per Dr. Tram Sun.   JOSE Owens

## 2017-04-20 NOTE — PROGRESS NOTES
Spoke to Ms. Sai Anguiano, daughter in law, and friend in room 18 about discharge planning. Ms. Sai Anguiano lives with her  in [facility] in [place], [place].  is able and willing to help as needed. Agreed on [facility] RN (recent surgery, surgical drains, sepsis) and PT. Referral, face to face, and home orders placed.

## 2017-04-20 NOTE — PROGRESS NOTES
General Surgery Progress Note    4/20/2017    Admit Date: 4/15/2017    Subjective:     Surgery  Resting in recliner. Feels well. Denies pain. Reports abd still feels distended -- but improving. Still feels \"puffy\" and mildly SOB but better after Lasix. BP stable. UOP adequate. AF, VSS.      Objective:     Visit Vitals    /65    Pulse 88    Temp 97.8 °F (36.6 °C)    Resp 17    Ht 5' 5\" (1.651 m)    Wt 75 kg (165 lb 6.4 oz)    SpO2 95%    Breastfeeding No    BMI 27.52 kg/m2         Intake/Output Summary (Last 24 hours) at 04/20/17 0817  Last data filed at 04/20/17 1151   Gross per 24 hour   Intake             1095 ml   Output             1155 ml   Net              -60 ml        EXAM:    CV: RRR, S1S2, no murmur, normal perfusion, 1+ peripheral edema remains  LUNG: Unlabored, no wheezing, BBS clear with diminished bases  ABD: Less distention, softer, non-tender, active BS, drains x2 with bilious material (L--20cc, R--335cc)         Data Review    Recent Results (from the past 24 hour(s))   PROCALCITONIN    Collection Time: 04/20/17  4:51 AM   Result Value Ref Range    Procalcitonin 0.6 ng/mL        Hospital Problems  Date Reviewed: 4/18/2017          Codes Class Noted POA    Anemia ICD-10-CM: D64.9  ICD-9-CM: 285.9  4/16/2017 Yes        * (Principal)Severe sepsis with septic shock ([facility]) ICD-10-CM: A41.9, R65.21  ICD-9-CM: 038.9, 995.92, 785.52  4/15/2017 Yes        Ascites ICD-10-CM: R18.8  ICD-9-CM: 789.59  4/15/2017 Yes        Abdominal distension ICD-10-CM: R14.0  ICD-9-CM: 787.3  4/15/2017 Yes        Hypotension ICD-10-CM: I95.9  ICD-9-CM: 458.9  4/15/2017 Yes        S/P laparoscopic cholecystectomy ICD-10-CM: Z90.49  ICD-9-CM: V45.89  4/15/2017 Yes    Overview Signed 4/15/2017  9:07 PM by Chioma Kong, DO     3 weeks ago by Dr. Ravindra Patricia             OMERO (acute kidney injury) [facility]) ICD-10-CM: N17.9  ICD-9-CM: 584.9  4/15/2017 Yes        Protein calorie malnutrition ([facility] ICD-10-CM: E46  ICD-9-CM: 263.9  4/15/2017 Yes    Overview Signed 4/15/2017 11:48 PM by Zuleyma Amezquita MD     With Albumin < 2 currently. PLAN:  Lasix again today -- felt better after prev dose  Diet to GI soft  Remains on Abx -- Zosyn/Vanc -- PCT 0.6 today  OOB as tolerated/Ambulate -- PT following  Follow labs/lytes -- supplement PRN      Clearance Kim, NP-C  Patient seen early this morning. Agree with above.   Clint Trivedi MD.

## 2017-04-20 NOTE — PROGRESS NOTES
END OF SHIFT NOTE:    INTAKE/OUTPUT  04/19 0701 - 04/20 0700  In: 1677 [I.V.:1723]  Out: 0330 [Urine:950; Drains:355]  Voiding: YES  Catheter: NO  Drain:   Drain 04/16/17 Left Abdomen (Active)   Site Assessment Clean, dry, & intact 4/20/2017  2:30 PM   Dressing Status Clean, dry, & intact 4/20/2017  2:30 PM   Status Patent;Draining 4/20/2017  2:30 PM   Drainage Description [place] 4/20/2017  2:30 PM   Output (ml) 0 ml 4/20/2017  2:30 PM       Drain 04/16/17 Right Abdomen (Active)   Site Assessment Clean, dry, & intact 4/20/2017  2:30 PM   Dressing Status Clean, dry, & intact 4/20/2017  2:30 PM   Status Patent;Draining 4/20/2017  2:30 PM   Drainage Description Green 4/20/2017  2:30 PM   Output (ml) 30 ml 4/20/2017  2:30 PM               Flatus: Patient does have flatus present. Stool:  1 occurrences. Characteristics:  Stool Assessment  Stool Color: Brown  Stool Appearance: Soft  Stool Amount: Large  Stool Source/Status: Rectum    Emesis: 0 occurrences. Characteristics:        VITAL SIGNS  Patient Vitals for the past 12 hrs:   Temp Pulse Resp BP SpO2   04/20/17 1442 97.7 °F (36.5 °C) 89 16 144/67 96 %   04/20/17 1038 98.1 °F (36.7 °C) 96 17 138/68 97 %   04/20/17 0717 97.8 °F (36.6 °C) 88 17 136/65 95 %       Pain Assessment  Pain Intensity 1: 0 (04/20/17 1440)  Pain Location 1: Generalized  Pain Intervention(s) 1: Medication (see MAR)  Patient Stated Pain Goal: 0    Ambulating  Yes with assistance    Shift report given to oncoming nurse at the bedside.     Inocente Walker RN

## 2017-04-20 NOTE — PROGRESS NOTES
END OF SHIFT NOTE:    INTAKE/OUTPUT  04/19 0701 - 04/20 0700  In: 1723 [I.V.:1723]  Out: 8680 [Urine:950; Drains:355]  Voiding: YES  Catheter: NO  Drain:   Drain 04/16/17 Left Abdomen (Active)   Site Assessment Clean, dry, & intact 4/20/2017  1:25 AM   Dressing Status Clean, dry, & intact 4/20/2017  1:25 AM   Status Patent;Draining 4/20/2017  1:25 AM   Drainage Description Green 4/20/2017  1:25 AM   Output (ml) 0 ml 4/20/2017  3:06 AM       Drain 04/16/17 Right Abdomen (Active)   Site Assessment Clean, dry, & intact 4/20/2017  1:25 AM   Dressing Status Clean, dry, & intact 4/20/2017  1:25 AM   Status Patent;Draining 4/20/2017  1:25 AM   Drainage Description Green 4/20/2017  1:25 AM   Output (ml) 120 ml 4/20/2017  3:06 AM               Flatus: Patient does have flatus present. Stool:  0 occurrences. Characteristics:  Stool Assessment  Stool Color: Brown  Stool Appearance: Loose  Stool Amount: Small  Stool Source/Status: Rectum    Emesis: 0 occurrences. Characteristics:        VITAL SIGNS  Patient Vitals for the past 12 hrs:   Temp Pulse Resp BP SpO2   04/20/17 0307 98.4 °F (36.9 °C) 97 17 147/80 93 %   04/19/17 2310 98.2 °F (36.8 °C) 96 17 145/71 98 %       Pain Assessment  Pain Intensity 1: 4 (04/20/17 0551)  Pain Location 1: Abdomen  Pain Intervention(s) 1: Medication (see MAR)  Patient Stated Pain Goal: 0    Ambulating  Yes    Shift report given to oncoming nurse at the bedside.     Yosef Dolan, RN

## 2017-04-20 NOTE — PROGRESS NOTES
Problem: Mobility Impaired (Adult and Pediatric)  Goal: *Acute Goals and Plan of Care (Insert Text)  STG:  (1.)Ms. Riya Raymond will roll and transition supine<>sitting with supervision within 3-5 day(s). (2.)Ms. Riya Raymond will transfer from bed to chair and chair to bed with contact guard assist using the least restrictive device within 3-5 day(s). (3.)Ms. Riya Raymond will ambulate with contact guard assistance for 100 feet with the least restrictive device within 3-5 day(s). LTG:  (1.)Ms. Riya Raymond will perform supine<>sitting in bed with modified independence within 5-7 day(s). (2.)Ms. Riya Raymond will transfer from bed to chair and chair to bed with modified independence using the least restrictive device within 5-7 day(s). (3.)Ms. Riya Raymond will ambulate with supervision for 150 feet with the least restrictive device within 5-7 day(s).   ________________________________________________________________________________________________       PHYSICAL THERAPY: INITIAL ASSESSMENT 4/19/2017  INPATIENT: Hospital Day: 6  [place] / Product Type: Medicare /      NAME/AGE/GENDER: Jazlyn Le is a 68 y.o. female          PRIMARY DIAGNOSIS: Sepsis ([facility] [address]  Bile leak Severe sepsis with septic shock (HCC) Severe sepsis with septic shock (HCC)  Procedure(s) (LRB):  ENDOSCOPIC RETROGRADE CHOLANGIOPANCREATOGRAPHY (N/A)  ENDOSCOPIC SPHINCTEROTOMY (N/A)  ENDOSCOPIC STONE EXTRACTION/BALLOON SWEEP (N/A)  BILIARY STENT PLACEMENT (N/A)  4 Days Post-Op  ICD-10: Treatment Diagnosis:       · Generalized Muscle Weakness (M62.81)  · Other lack of cordination (R27.8)  · Difficulty in walking, Not elsewhere classified (R26.2)  · Other abnormalities of gait and mobility (R26.89)  · Abnormal posture (R29.3)   Precaution/Allergies:  Ciprofloxacin; Lipitor [atorvastatin]; and Ultram [tramadol]       ASSESSMENT:      Ms. Riya Raymond presents supine in bed upon PT arrival to begin treatment with spouse in room. Patient denied pain. Patient demonstrated 4/5 strength in bilateral LEs for knee flexion/extension and 4+/5 plantarflexion/dorsiflexion bilaterally. Patient transitioned from supine to sitting with CGA x 1 and then proceeded to stand from sitting with min A x 1 to stand at [facility]. Patient then ambulated slowly for 60 feet with RW and CGA. Patient ambulated with forward trunk lean, and returned to room, sitting with minimal assist. Patient transitioned from sitting to supine with min A to assist with R LE. Patient concluded PT with all needs in reach and family in room. Patient will benefit from continued PT to return patient to prior level of function. This section established at most recent assessment   PROBLEM LIST (Impairments causing functional limitations):  1. Decreased Strength  2. Decreased ADL/Functional Activities  3. Decreased Transfer Abilities  4. Decreased Ambulation Ability/Technique  5. Decreased Balance  6. Decreased Activity Tolerance  7. Increased Fatigue    INTERVENTIONS PLANNED: (Benefits and precautions of physical therapy have been discussed with the patient.)  1. Balance Exercise  2. Bed Mobility  3. Gait Training  4. Home Exercise Program (HEP)  5. Manual Therapy  6. Neuromuscular Re-education/Strengthening  7. Group Therapy      TREATMENT PLAN: Frequency/Duration: 5 times/week for 2 weeks  Rehabilitation Potential For Stated Goals: GOOD      RECOMMENDED REHABILITATION/EQUIPMENT: (at time of discharge pending progress): Continue Skilled Therapy, Home Health: Physical Therapy, Rehab, Adaptive Equipment: Walkers, Type: Rolling Walker and potentially 9th floor inpatient rehab pending patient improvement in acute care. HISTORY:   History of Present Injury/Illness (Reason for Referral):  Laureen Rosado is a 68 y.o.  female who presents with abdominal distension and pain since her lap patrice 3 weeks ago.  She was seen in FU with Dr. Corey Cotto since surgery but has continued to get weaker and weaker with diminished PO intake. Her Abd has been swelling almost immediately after surgery. She denies any fevers or chills. BM has been normal. No Nausea or vomiting. Upon arrival she was initially hypertensive but since she has become hypotensive. Has received only 500 cc of NS. Zosyn started given her WBC of 22 K. Surgery feels this is liver failure and she needs GI workup. Therefore hospitalist was asked to admit  Past Medical History/Comorbidities:   Ms. Padron Born  has a past medical history of Breast cancer ([facility] [address] (20 years ago); Former cigarette smoker; and Gout. She also has no past medical history of Adverse effect of anesthesia; Difficult intubation; Malignant hyperthermia due to anesthesia; Nausea & vomiting; or Pseudocholinesterase deficiency. Ms. Padron Born  has a past surgical history that includes breast lumpectomy (Left); colonoscopy; wisdom teeth extraction; and cholecystectomy (2017). Social History/Living Environment:   Home Environment: Private residence  # Steps to Enter: 0  One/Two Story Residence: One story  Living Alone: No  Support Systems: Spouse/Significant Other/Partner, Friends \ neighbors  Patient Expects to be Discharged to[de-identified] Private residence  Current DME Used/Available at Home: None  Prior Level of Function/Work/Activity:  Pt was independent previously and did not use any assistive devices to perform mobility. Number of Personal Factors/Comorbidities that affect the Plan of Care: 1-2: MODERATE COMPLEXITY   EXAMINATION:   Most Recent Physical Functioning:   Gross Assessment:  AROM: Within functional limits  Strength:  Within functional limits  Sensation: Intact               Posture:  Posture (WDL): Exceptions to WDL  Posture Assessment: Rounded shoulders, Forward head  Balance:   Sitting: Intact  Standing: Impaired  Standing - Static: Fair  Standing - Dynamic: Fair Bed Mobility:   Rolling: Supervision  Sit to supine: Contact Guard Assistance  Supine to sit: Minimal Assistance  Wheelchair Mobility:     Transfers:  Sit to stand: Minimal assistance  Stand to sit: Minimal assistance  Stand pivot transfer: Minimal assistance     Gait:   Step length: Left shortened, Right shortened  Gait abnormalities: Shuffling gait  Level of assist: Contact Guard Assistance  Distance (Ft): 60 feet  Assistive device: walker, rolling             Body Structures Involved:  1. Nerves  2. Digestive Structures  3. Metabolic  4. Muscles Body Functions Affected:  1. Cardio  2. Respiratory  3. Neuromusculoskeletal  4. Movement Related  5. Skin Related  6. Digestive Activities and Participation Affected:  1. General Tasks and Demands  2. Mobility  3. Interpersonal Interactions and Relationships  4. Community, Social and [place]   Number of elements that affect the Plan of Care: 4+: HIGH COMPLEXITY   CLINICAL PRESENTATION:   Presentation: Evolving clinical presentation with changing clinical characteristics: MODERATE COMPLEXITY   CLINICAL DECISION MAKIN [facility] Inpatient Short Form  How much difficulty does the patient currently have. .. Unable A Lot A Little None   1. Turning over in bed (including adjusting bedclothes, sheets and blankets)? [ ] 1   [ ] 2   [X] 3   [ ] 4   2. Sitting down on and standing up from a chair with arms ( e.g., wheelchair, bedside commode, etc.)   [ ] 1   [ ] 2   [X] 3   [ ] 4   3. Moving from lying on back to sitting on the side of the bed? [ ] 1   [ ] 2   [X] 3   [ ] 4   How much help from another person does the patient currently need. .. Total A Lot A Little None   4. Moving to and from a bed to a chair (including a wheelchair)? [ ] 1   [ ] 2   [X] 3   [ ] 4   5. Need to walk in hospital room? [ ] 1   [ ] 2   [X] 3   [ ] 4   6. Climbing 3-5 steps with a railing? [ ] 1   [ ] 2   [X] 3   [ ] 4   © , Trustees of [address] [place] [address] [address] [address], under license to [employer].  All rights reserved    Score:  Initial: 18 Most Recent: X (Date: 4/19/17)     Interpretation of Tool:  Represents activities that are increasingly more difficult (i.e. Bed mobility, Transfers, Gait). Score 24 23 22-20 19-15 14-10 9-7 6       Modifier CH CI CJ CK CL CM CN         · Mobility - Walking and Moving Around:               - CURRENT STATUS:    CK - 40%-59% impaired, limited or restricted               - GOAL STATUS:           CJ - 20%-39% impaired, limited or restricted               - D/C STATUS:                       ---------------To be determined---------------  [place] / Product Type: Medicare /       Medical Necessity:     · Skilled intervention continues to be required due to decreased endurance, generalized weakness, reduced strength and impaired balance. .  Reason for Services/Other Comments:  · Patient continues to require skilled intervention due to impaired bed mobility, impaired transfers, and impaired gait. .   Use of outcome tool(s) and clinical judgement create a POC that gives a: Questionable prediction of patient's progress: MODERATE COMPLEXITY                 TREATMENT:   (In addition to Assessment/Re-Assessment sessions the following treatments were rendered)   Pre-treatment Symptoms/Complaints:  Pt denied being in any pain at beginning of PT  Pain: Initial:   Pain Intensity 1: 0  Post Session:  No pain reported      Assessment/Reassessment only, no treatment provided today     Braces/Orthotics/Lines/Etc:   · IV  · drain (two)  · O2 Device: Room air  Treatment/Session Assessment:    · Response to Treatment:  Tolerated well, mildly fatigued following gait  · Interdisciplinary Collaboration:  · Registered Nurse  · After treatment position/precautions:  · Supine in bed  · Bed/Chair-wheels locked  · Bed in low position  · Caregiver at bedside  · Call light within reach  · RN notified  · Family at bedside  · Side rails x 2  · Compliance with Program/Exercises: Will assess as treatment progresses  · Recommendations/Intent for next treatment session: \"Next visit will focus on advancements to more challenging activities\".   Total Treatment Duration: 22 minutes        Alex Pan, PT

## 2017-04-21 LAB
ANION GAP BLD CALC-SCNC: 11 MMOL/L (ref 7–16)
BACTERIA SPEC CULT: ABNORMAL
BACTERIA SPEC CULT: NORMAL
BACTERIA SPEC CULT: NORMAL
BUN SERPL-MCNC: 13 MG/DL (ref 8–23)
CALCIUM SERPL-MCNC: 7.5 MG/DL (ref 8.3–10.4)
CHLORIDE SERPL-SCNC: 111 MMOL/L (ref 98–107)
CO2 SERPL-SCNC: 25 MMOL/L (ref 21–32)
CREAT SERPL-MCNC: 0.75 MG/DL (ref 0.6–1)
ERYTHROCYTE [DISTWIDTH] IN BLOOD BY AUTOMATED COUNT: 13.9 % (ref 11.9–14.6)
GLUCOSE SERPL-MCNC: 86 MG/DL (ref 65–100)
GRAM STN SPEC: ABNORMAL
GRAM STN SPEC: ABNORMAL
HCT VFR BLD AUTO: 30.8 % (ref 35.8–46.3)
HGB BLD-MCNC: 10.2 G/DL (ref 11.7–15.4)
MCH RBC QN AUTO: 27.6 PG (ref 26.1–32.9)
MCHC RBC AUTO-ENTMCNC: 33.1 G/DL (ref 31.4–35)
MCV RBC AUTO: 83.2 FL (ref 79.6–97.8)
PLATELET # BLD AUTO: 227 K/UL (ref 150–450)
PMV BLD AUTO: 10.1 FL (ref 10.8–14.1)
POTASSIUM SERPL-SCNC: 2.9 MMOL/L (ref 3.5–5.1)
POTASSIUM SERPL-SCNC: 3.3 MMOL/L (ref 3.5–5.1)
RBC # BLD AUTO: 3.7 M/UL (ref 4.05–5.25)
SERVICE CMNT-IMP: ABNORMAL
SERVICE CMNT-IMP: NORMAL
SERVICE CMNT-IMP: NORMAL
SODIUM SERPL-SCNC: 147 MMOL/L (ref 136–145)
VANCOMYCIN TROUGH SERPL-MCNC: 15.9 UG/ML (ref 5–20)
WBC # BLD AUTO: 6.5 K/UL (ref 4.3–11.1)

## 2017-04-21 PROCEDURE — 74011000258 HC RX REV CODE- 258: Performed by: INTERNAL MEDICINE

## 2017-04-21 PROCEDURE — 85027 COMPLETE CBC AUTOMATED: CPT | Performed by: INTERNAL MEDICINE

## 2017-04-21 PROCEDURE — 36415 COLL VENOUS BLD VENIPUNCTURE: CPT | Performed by: INTERNAL MEDICINE

## 2017-04-21 PROCEDURE — 74011250636 HC RX REV CODE- 250/636: Performed by: SURGERY

## 2017-04-21 PROCEDURE — 99232 SBSQ HOSP IP/OBS MODERATE 35: CPT | Performed by: INTERNAL MEDICINE

## 2017-04-21 PROCEDURE — 84132 ASSAY OF SERUM POTASSIUM: CPT | Performed by: SURGERY

## 2017-04-21 PROCEDURE — 97530 THERAPEUTIC ACTIVITIES: CPT

## 2017-04-21 PROCEDURE — 65270000029 HC RM PRIVATE

## 2017-04-21 PROCEDURE — 74011250636 HC RX REV CODE- 250/636: Performed by: INTERNAL MEDICINE

## 2017-04-21 PROCEDURE — 80202 ASSAY OF VANCOMYCIN: CPT | Performed by: INTERNAL MEDICINE

## 2017-04-21 PROCEDURE — 80048 BASIC METABOLIC PNL TOTAL CA: CPT | Performed by: INTERNAL MEDICINE

## 2017-04-21 PROCEDURE — 74011250637 HC RX REV CODE- 250/637: Performed by: SURGERY

## 2017-04-21 PROCEDURE — 74011250637 HC RX REV CODE- 250/637: Performed by: NURSE PRACTITIONER

## 2017-04-21 RX ORDER — POTASSIUM CHLORIDE 14.9 MG/ML
20 INJECTION INTRAVENOUS
Status: COMPLETED | OUTPATIENT
Start: 2017-04-21 | End: 2017-04-21

## 2017-04-21 RX ORDER — POTASSIUM CHLORIDE 14.9 MG/ML
20 INJECTION INTRAVENOUS
Status: DISCONTINUED | OUTPATIENT
Start: 2017-04-21 | End: 2017-04-21

## 2017-04-21 RX ORDER — POTASSIUM CHLORIDE 20 MEQ/1
40 TABLET, EXTENDED RELEASE ORAL
Status: COMPLETED | OUTPATIENT
Start: 2017-04-21 | End: 2017-04-21

## 2017-04-21 RX ADMIN — POTASSIUM CHLORIDE 20 MEQ: 14.9 INJECTION, SOLUTION INTRAVENOUS at 11:11

## 2017-04-21 RX ADMIN — FAMOTIDINE 20 MG: 20 TABLET ORAL at 08:52

## 2017-04-21 RX ADMIN — POTASSIUM CHLORIDE 20 MEQ: 14.9 INJECTION, SOLUTION INTRAVENOUS at 12:55

## 2017-04-21 RX ADMIN — ZOLPIDEM TARTRATE 5 MG: 5 TABLET ORAL at 21:17

## 2017-04-21 RX ADMIN — POTASSIUM CHLORIDE 40 MEQ: 20 TABLET, EXTENDED RELEASE ORAL at 11:11

## 2017-04-21 RX ADMIN — FAMOTIDINE 20 MG: 20 TABLET ORAL at 17:14

## 2017-04-21 RX ADMIN — PIPERACILLIN SODIUM,TAZOBACTAM SODIUM 3.38 G: 3; .375 INJECTION, POWDER, FOR SOLUTION INTRAVENOUS at 00:02

## 2017-04-21 RX ADMIN — VANCOMYCIN HYDROCHLORIDE 750 MG: 10 INJECTION, POWDER, LYOPHILIZED, FOR SOLUTION INTRAVENOUS at 01:50

## 2017-04-21 RX ADMIN — PIPERACILLIN SODIUM,TAZOBACTAM SODIUM 3.38 G: 3; .375 INJECTION, POWDER, FOR SOLUTION INTRAVENOUS at 08:52

## 2017-04-21 RX ADMIN — LEVOTHYROXINE SODIUM 75 MCG: 75 TABLET ORAL at 08:52

## 2017-04-21 NOTE — PROGRESS NOTES
Problem: Mobility Impaired (Adult and Pediatric)  Goal: *Acute Goals and Plan of Care (Insert Text)  STG:  (1.)Ms. Martine Hernández will roll and transition supine<>sitting with supervision within 3-5 day(s). (2.)Ms. Martine Hernández will transfer from bed to chair and chair to bed with contact guard assist using the least restrictive device within 3-5 day(s). (3.)Ms. Martine Hernández will ambulate with contact guard assistance for 100 feet with the least restrictive device within 3-5 day(s). LTG:  (1.)Ms. Martine Hernández will perform supine<>sitting in bed with modified independence within 5-7 day(s). (2.)Ms. Martine Hernández will transfer from bed to chair and chair to bed with modified independence using the least restrictive device within 5-7 day(s). (3.)Ms. Martine Hernández will ambulate with supervision for 150 feet with the least restrictive device within 5-7 day(s).   ________________________________________________________________________________________________       PHYSICAL THERAPY: Daily Note, Treatment Day: 1st and AM 4/21/17  INPATIENT: Hospital Day: 7  [place] / Product Type: Medicare /      NAME/AGE/GENDER: Marily Moe is a 68 y.o. female          PRIMARY DIAGNOSIS: Sepsis ([facility] [address]  Bile leak Severe sepsis with septic shock (HCC) Severe sepsis with septic shock (HCC)  Procedure(s) (LRB):  ENDOSCOPIC RETROGRADE CHOLANGIOPANCREATOGRAPHY (N/A)  ENDOSCOPIC SPHINCTEROTOMY (N/A)  ENDOSCOPIC STONE EXTRACTION/BALLOON SWEEP (N/A)  BILIARY STENT PLACEMENT (N/A)  5 Days Post-Op  ICD-10: Treatment Diagnosis:       · Generalized Muscle Weakness (M62.81)  · Other lack of cordination (R27.8)  · Difficulty in walking, Not elsewhere classified (R26.2)  · Other abnormalities of gait and mobility (R26.89)  · Abnormal posture (R29.3)   Precaution/Allergies:  Ciprofloxacin; Lipitor [atorvastatin]; and Ultram [tramadol]       ASSESSMENT:      Ms. Martine Hernández presents supine in bed upon PT arrival to begin treatment with spouse in room. Patient denied pain. She was educated on log roll technique, requiring minimal assistance and significant verbal cues to perform, somewhat frustrated, stating, \"why can't I just pull on my ?!\" She stood with minimal assistance and able to increase distance ambulated to 250' with rolling walker and occasional cues for walker negotiation within room and hallway to avoid hitting objects and to keep walker in close proximity. Patient ambulated with forward trunk lean, and returned to room, sitting with minimal assist in recliner. Discussed with patient, she wishes to obtain rolling walker and bedside commode, both of which she will greatly benefit from at d/c for safety. Discussed with . Patient concluded PT with all needs in reach and family in room. Patient will benefit from continued PT to return patient to prior level of function. This section established at most recent assessment   PROBLEM LIST (Impairments causing functional limitations):  1. Decreased Strength  2. Decreased ADL/Functional Activities  3. Decreased Transfer Abilities  4. Decreased Ambulation Ability/Technique  5. Decreased Balance  6. Decreased Activity Tolerance  7. Increased Fatigue    INTERVENTIONS PLANNED: (Benefits and precautions of physical therapy have been discussed with the patient.)  1. Balance Exercise  2. Bed Mobility  3. Gait Training  4. Home Exercise Program (HEP)  5. Manual Therapy  6. Neuromuscular Re-education/Strengthening  7. Group Therapy      TREATMENT PLAN: Frequency/Duration: 5 times/week for 2 weeks  Rehabilitation Potential For Stated Goals: GOOD      RECOMMENDED REHABILITATION/EQUIPMENT: (at time of discharge pending progress): Continue Skilled Therapy, Home Health: Physical Therapy and Adaptive Equipment: Walkers, Type: Rolling Walker. HISTORY:   History of Present Injury/Illness (Reason for Referral):  Reed Chavarria is a 68 y.o.  female who presents with abdominal distension and pain since her lap patrice 3 weeks ago. She was seen in FU with Dr. Marcell Calle since surgery but has continued to get weaker and weaker with diminished PO intake. Her Abd has been swelling almost immediately after surgery. She denies any fevers or chills. BM has been normal. No Nausea or vomiting. Upon arrival she was initially hypertensive but since she has become hypotensive. Has received only 500 cc of NS. Zosyn started given her WBC of 22 K. Surgery feels this is liver failure and she needs GI workup. Therefore hospitalist was asked to admit  Past Medical History/Comorbidities:   Ms. Pauline Coker  has a past medical history of Breast cancer ([facility] [address] (20 years ago); Former cigarette smoker; and Gout. She also has no past medical history of Adverse effect of anesthesia; Difficult intubation; Malignant hyperthermia due to anesthesia; Nausea & vomiting; or Pseudocholinesterase deficiency. Ms. Pauline Coker  has a past surgical history that includes breast lumpectomy (Left); colonoscopy; wisdom teeth extraction; and cholecystectomy (2017). Social History/Living Environment:   Home Environment: Private residence  # Steps to Enter: 0  One/Two Story Residence: One story  Living Alone: No  Support Systems: Spouse/Significant Other/Partner, Friends \ neighbors  Patient Expects to be Discharged to[de-identified] Private residence  Current DME Used/Available at Home: None  Prior Level of Function/Work/Activity:  Pt was independent previously and did not use any assistive devices to perform mobility.       Number of Personal Factors/Comorbidities that affect the Plan of Care: 1-2: MODERATE COMPLEXITY   EXAMINATION:   Most Recent Physical Functioning:   Gross Assessment:                  Posture:     Balance:  Sitting: Intact  Standing: Impaired  Standing - Static: Fair  Standing - Dynamic : FairSitting: Intact  Standing: Impaired  Standing - Static: Fair  Standing - Dynamic: Fair Bed Mobility:  Rolling: Contact guard assistance  Supine to Sit: Minimum assistance  Scooting: Minimum assistance  Interventions: Safety awareness training;Verbal cuesRolling: Supervision  Sit to supine: Contact Guard Assistance  Supine to sit: Minimal Assistance  Wheelchair Mobility:     Transfers:  Sit to stand: Minimal assistance  Stand to sit: Minimal assistance  Stand pivot transfer: Minimal assistance  Sit to Stand: Minimum assistance  Stand to Sit: Minimum assistance  Bed to Chair: Contact guard assistance  Interventions: Safety awareness training;Verbal cues; Visual cues  Duration: 24 Minutes  Gait:   Step length: Left shortened, Right shortened  Gait abnormalities: Shuffling gait  Level of assist: Contact Guard Assistance  Distance (Ft): 60 feet  Assistive device: walker, rolling     Step Length: Left shortened;Right shortened  Gait Abnormalities: Decreased step clearance;Shuffling gait;Trunk sway increased  Distance (ft): 250 Feet (ft)  Assistive Device: Walker, rolling       Body Structures Involved:  1. Nerves  2. Digestive Structures  3. Metabolic  4. Muscles Body Functions Affected:  1. Cardio  2. Respiratory  3. Neuromusculoskeletal  4. Movement Related  5. Skin Related  6. Digestive Activities and Participation Affected:  1. General Tasks and Demands  2. Mobility  3. Interpersonal Interactions and Relationships  4. Community, Social and [place]   Number of elements that affect the Plan of Care: 4+: HIGH COMPLEXITY   CLINICAL PRESENTATION:   Presentation: Evolving clinical presentation with changing clinical characteristics: MODERATE COMPLEXITY   CLINICAL DECISION MAKIN [facility] Inpatient Short Form  How much difficulty does the patient currently have. .. Unable A Lot A Little None   1. Turning over in bed (including adjusting bedclothes, sheets and blankets)? [ ] 1   [ ] 2   [X] 3   [ ] 4   2.   Sitting down on and standing up from a chair with arms ( e.g., wheelchair, bedside commode, etc.)   [ ] 1   [ ] 2   [X] 3   [ ] 4   3. Moving from lying on back to sitting on the side of the bed? [ ] 1   [ ] 2   [X] 3   [ ] 4   How much help from another person does the patient currently need. .. Total A Lot A Little None   4. Moving to and from a bed to a chair (including a wheelchair)? [ ] 1   [ ] 2   [X] 3   [ ] 4   5. Need to walk in hospital room? [ ] 1   [ ] 2   [X] 3   [ ] 4   6. Climbing 3-5 steps with a railing? [ ] 1   [ ] 2   [X] 3   [ ] 4   © 2007, Trustees of [address] [place] [address] [address] [address], under license to [employer]. All rights reserved    Score:  Initial: 18 Most Recent: X (Date: 4/19/17)     Interpretation of Tool:  Represents activities that are increasingly more difficult (i.e. Bed mobility, Transfers, Gait). Score 24 23 22-20 19-15 14-10 9-7 6       Modifier CH CI CJ CK CL CM CN         · Mobility - Walking and Moving Around:               - CURRENT STATUS:    CK - 40%-59% impaired, limited or restricted               - GOAL STATUS:           CJ - 20%-39% impaired, limited or restricted               - D/C STATUS:                       ---------------To be determined---------------  [place] / Product Type: Medicare /       Medical Necessity:     · Skilled intervention continues to be required due to decreased endurance, generalized weakness, reduced strength and impaired balance. .  Reason for Services/Other Comments:  · Patient continues to require skilled intervention due to impaired bed mobility, impaired transfers, and impaired gait. .   Use of outcome tool(s) and clinical judgement create a POC that gives a: Questionable prediction of patient's progress: MODERATE COMPLEXITY                 TREATMENT:   (In addition to Assessment/Re-Assessment sessions the following treatments were rendered)   Pre-treatment Symptoms/Complaints:  Pt denied being in any pain at beginning of PT  Pain: Initial:   Pain Intensity 1: 0  Post Session:  0/10      Therapeutic Activity: (  24 Minutes): Therapeutic activities including Bed transfers, Chair transfers, Ambulation on level ground and standing balance activities and education on walker negotiation to improve mobility, strength, balance and safety. Required minimal   to promote static and dynamic balance in standing and for walker negotiation within room and hallway. Braces/Orthotics/Lines/Etc:   · IV and drain abdominal  Treatment/Session Assessment:    · Response to Treatment:  Tolerated well, mildly fatigued following gait  · Interdisciplinary Collaboration:  · Registered Nurse  · After treatment position/precautions:  · Up in chair, Bed/Chair-wheels locked, Bed in low position, Caregiver at bedside, Call light within reach, RN notified, Family at bedside and Side rails x 2  · Compliance with Program/Exercises: Will assess as treatment progresses  · Recommendations/Intent for next treatment session: \"Next visit will focus on advancements to more challenging activities\".   Total Treatment Duration: 22 minutes  PT Patient Time In/Time Out  Time In: 1028  Time Out: 8826 [facility], [facility]

## 2017-04-21 NOTE — PROGRESS NOTES
PLAN:  Diet to GI soft  DC IVF  DC - Zosyn & Vanc  DC Drain Left abdomen  OOB as tolerated/Ambulate -- PT following  Follow labs/lytes -- supplement PRN  Possibly DC home later today or in am  Pt will follow up in office with Dr. Jossue Patel 4/27/17    ASSESSMENT:  Admit Date: 4/15/2017   5 Days Post-Op  Procedure(s) with comments:  ENDOSCOPIC RETROGRADE CHOLANGIOPANCREATOGRAPHY - ERCP  ENDOSCOPIC SPHINCTEROTOMY - Sphincterotomy  ENDOSCOPIC STONE EXTRACTION/BALLOON SWEEP - Balloon sweep    BILIARY STENT PLACEMENT - Stent placement    Principal Problem:    Severe sepsis with septic shock ([address] (4/15/2017)    Active Problems:    Ascites (4/15/2017)      Abdominal distension (4/15/2017)      Hypotension (4/15/2017)      S/P laparoscopic cholecystectomy (4/15/2017)      Overview: 3 weeks ago by Dr. Jossue Patel      OMERO (acute kidney injury) ([facility] (4/15/2017)      Protein calorie malnutrition ([facility] (4/15/2017)      Overview: With Albumin < 2 currently. Anemia (4/16/2017)      Edema of both legs (4/20/2017)       SUBJECTIVE:  Pt awake in bed. No new complaints. Slept well last night after taking Ambien. Tolerating GI soft diet without problems. Denies nausea or vomiting. +BM, Voiding without difficulty. Family at bedside. AF, VSS. OBJECTIVE:  Constitutional: Alert, cooperative, no acute distress; appears stated age   Visit Vitals    /69    Pulse 80    Temp 97.8 °F (36.6 °C)    Resp 18    Ht 5' 5\" (1.651 m)    Wt 165 lb 6.4 oz (75 kg)    SpO2 93%    Breastfeeding No    BMI 27.52 kg/m2     Eyes:Sclera are clear. ENMT: no external lesions gross hearing normal; no obvious neck masses, no ear or lip lesions  CV: RRR, S1S2, no murmur, normal perfusion, moderate peripheral edema remains  Resp: No JVD. Breathing is  non-labored; no audible wheezing. GI: Soft, non-distendedr, non-tender, active BS, drains x2 with bilious material (L--90cc, R--405cc)    Musculoskeletal: unremarkable with normal function.  No embolic signs or cyanosis. Neuro:  AOx3; moves all 4; no focal deficits  Psychiatric: normal affect and mood, no memory impairment      Patient Vitals for the past 24 hrs:   BP Temp Pulse Resp SpO2   04/21/17 0754 138/69 97.8 °F (36.6 °C) 80 18 93 %   04/21/17 0320 138/62 98 °F (36.7 °C) 82 16 95 %   04/20/17 2310 128/65 98.2 °F (36.8 °C) 97 16 95 %   04/20/17 1930 137/71 98 °F (36.7 °C) 92 16 94 %   04/20/17 1442 144/67 97.7 °F (36.5 °C) 89 16 96 %   04/20/17 1038 138/68 98.1 °F (36.7 °C) 96 17 97 %     Labs:  Recent Labs      04/21/17   0610   WBC  6.5   HGB  10.2*   PLT  227   NA  147*   K  2.9*   CL  111*   CO2  25   BUN  13   CREA  0.75   GLU  86       Satish Rosario, [facility]-BC    The patient was seen in conjunction with Dr. Rachel Nava who independently evaluated the patient, reviewed the chart and agreed with the assessment and plan.

## 2017-04-21 NOTE — PROGRESS NOTES
Pharmacokinetic Consult to Pharmacist    Saundra Abraham is a 68 y.o. female being treated for possible intra-abdominal infection with vancomycin and pip-tazo. Has ascites - S/P Lap Cholecystectomy about 3 weeks ago. The patient underwent ERCP with stent placement on 4/16/17. Height: 5' 5\" (165.1 cm)  Weight: 75 kg (165 lb 6.4 oz)  Lab Results   Component Value Date/Time    BUN 13 04/21/2017 06:10 AM    Creatinine 0.75 04/21/2017 06:10 AM    WBC 6.5 04/21/2017 06:10 AM    Procalcitonin 0.6 04/20/2017 04:51 AM      Estimated Creatinine Clearance: 64.7 mL/min (based on Cr of 0.75). CULTURES:  4/15   Urine   MSF, E. faecalis, final     Blood X 2  NG, final     MRSA screen  Negative     Body fluid (bile) Heavy strep parasanguinis     Lab Results   Component Value Date/Time    Vancomycin,trough 15.9 04/21/2017 12:04 AM       Day 6 of vancomycin. Goal trough is 15 - 20 . Vancomycin trough of 24.8 yesterday was drawn after the dose was given. Recheck was therapeutic at 15.9. Continue 750 mg IV q12h. Further levels will be ordered as clinically indicated. Pharmacy will continue to follow. Please call with any questions.       Thank you,  Amy Sorto, PharmD  Clinical Pharmacist  [phone]

## 2017-04-21 NOTE — PROGRESS NOTES
Ordered BSC and RW from [address] [place] [address] (phone [phone]; fax [phone]) to be delivered to room 206.

## 2017-04-21 NOTE — PROGRESS NOTES
Critical potassium level 2.9, orders replaced per protocol and Per Ignacia Cervantes, NP said ok to give one 40 mEq po.

## 2017-04-21 NOTE — PROGRESS NOTES
[facility]  Admission Date: 4/15/2017             Daily Progress Note: 4/21/2017    The patient's chart is reviewed and the patient is discussed with the staff. 67 yo female with a history of HNT, HL, hypothyroidism, and DM. She is s/p cholecystectomy 3/24/2017. She presented with abd distention and sob and was found to have abn LFTs, elevated creat,WBC 22,and was hypotensive despite IVF. CT abd with ascites but no obstruction. She was admitted to ICU and General Surgery was consulted. HIDA scan with possible bile leak  And she underwent ERCP/sphincterotomy per GI on 4/16 - cystic duck leak- stent placed, and common bile duct stone noted. Plan for EGD with stent removal in 8-10 weeks. She required levophed post procedure for ongoing hypotension. IR was consulted with peritoneal drain placement 4/16. She received 2 units PRBCs on 4/18. Subjective:     Currently on RA with o2 sat 95%. Occasional minimally productive cough. Denies chest pain or palpitations. Denies nausea - tolerating clear liquid diet.       Current Facility-Administered Medications   Medication Dose Route Frequency    vancomycin (VANCOCIN) 750 mg in  ml infusion  750 mg IntraVENous Q12H    levothyroxine (SYNTHROID) tablet 75 mcg  75 mcg Oral ACB    famotidine (PEPCID) tablet 20 mg  20 mg Oral BID    oxyCODONE-acetaminophen (PERCOCET) 5-325 mg per tablet 2 Tab  2 Tab Oral Q4H PRN    oxyCODONE-acetaminophen (PERCOCET) 5-325 mg per tablet 1 Tab  1 Tab Oral Q4H PRN    zolpidem (AMBIEN) tablet 5 mg  5 mg Oral QHS PRN    NUTRITIONAL SUPPORT ELECTROLYTE PRN ORDERS   Does Not Apply PRN    diphenhydrAMINE (BENADRYL) injection 25 mg  25 mg IntraVENous QHS PRN    diphenhydrAMINE (BENADRYL) injection 50 mg  50 mg IntraVENous QHS PRN    piperacillin-tazobactam (ZOSYN) 3.375 g in 0.9% sodium chloride (MBP/ADV) 100 mL  3.375 g IntraVENous Q8H    morphine injection 4 mg  4 mg IntraVENous Q3H PRN    morphine injection 2 mg  2 mg IntraVENous Q3H PRN    morphine injection 3 mg  3 mg IntraVENous Q3H PRN    oxyCODONE-acetaminophen (PERCOCET) 5-325 mg per tablet 1 Tab  1 Tab Oral Q4H PRN    sodium chloride (NS) flush 5-10 mL  5-10 mL IntraVENous PRN    ondansetron (ZOFRAN) injection 4 mg  4 mg IntraVENous Q4H PRN       Review of Systems  Constitutional: negative for fever, chills, sweats  Cardiovascular: negative for chest pain, palpitations, syncope, ++edema  Gastrointestinal:  negative for dysphagia, reflux, vomiting, diarrhea, abdominal pain, or melena  Neurologic:  negative for focal weakness, numbness, headache    Objective:     Vitals:    04/20/17 1442 04/20/17 1930 04/20/17 2310 04/21/17 0320   BP: 144/67 137/71 128/65 138/62   Pulse: 89 92 97 82   Resp: 16 16 16 16   Temp: 97.7 °F (36.5 °C) 98 °F (36.7 °C) 98.2 °F (36.8 °C) 98 °F (36.7 °C)   SpO2: 96% 94% 95% 95%   Weight:       Height:         Intake and Output:   04/19 1901 - 04/21 0700  In: 1229 [I.V.:1229]  Out: 2425 [Urine:1720; Drains:705]       Physical Exam:   Constitution:  the patient is well developed and in no acute distress  EENMT:  Sclera clear, pupils equal, oral mucosa moist  Respiratory: scattered wheezing that clears with cough, RA  Cardiovascular:  RRR without M,G,R  Gastrointestinal: soft and non-tender; with positive bowel sounds. Musculoskeletal: warm without cyanosis. There is 2+ lower leg edema. Skin:  no jaundice or rashes, abd drains x 2   Neurologic: no gross neuro deficits     Psychiatric:  alert and oriented x 3    CHEST XRAY:   4/16      LAB   Recent Labs      04/19/17   0533   WBC  7.6   HGB  10.8*   HCT  31.7*   PLT  560*     Recent Labs      04/19/17   0533   NA  145   K  3.6   CL  113*   CO2  21   GLU  89   BUN  18   CREA  0.81   CA  7.6*     No results for input(s): PH, PCO2, PO2, HCO3 in the last 72 hours. No results for input(s): LCAD, LAC in the last 72 hours.       Assessment:  (Medical Decision Making) Hospital Problems  Date Reviewed: 4/21/2017          Codes Class Noted POA    Edema of both legs ICD-10-CM: R60.0  ICD-9-CM: 782.3  4/20/2017 Unknown    Ongoing despite lasix      Anemia ICD-10-CM: D64.9  ICD-9-CM: 285.9  4/16/2017 Yes    7.0 >>>10.8  S/P transfusion 4/18      * (Principal)Severe sepsis with septic shock ([facility] [address] ICD-10-CM: A41.9, R65.21  ICD-9-CM: 038.9, 995.92, 785.52  4/15/2017 Yes    abx  Hypotension has resolved      Ascites ICD-10-CM: R18.8  ICD-9-CM: 789.59  4/15/2017 Yes    Secondary to cystic duct leak  S/P ERCP with stent placement  S/P IR drain placement      Abdominal distension ICD-10-CM: R14.0  ICD-9-CM: 787.3  4/15/2017 Yes        Hypotension ICD-10-CM: I95.9  ICD-9-CM: 458.9  4/15/2017 Yes    Resolved      S/P laparoscopic cholecystectomy ICD-10-CM: Z90.49  ICD-9-CM: V45.89  4/15/2017 Yes     3 weeks ago by Dr. [facility]         OMERO (acute kidney injury) [facility]) ICD-10-CM: N17.9  ICD-9-CM: 584.9  4/15/2017 Yes    Resolved  Creat 0.81      Protein calorie malnutrition ([facility] [address] ICD-10-CM: E46  ICD-9-CM: 263.9  4/15/2017 Yes     With Albumin 1.7 on most recent labs          Micro:   4/16 body fluid: GPC - ID/sensitivities pending  4/16 BC: negative x 2  4/15 UC: E.faecalis    Plan:  (Medical Decision Making)     --Zosyn / [address]   PCT 0.6  --received lasix yesterday pm - Incomplete I&Os secondary to incontinence   --may benefit from additional lasix with ongoing LE swelling  --495 ml output from abd drain  --plan repeat ERCP in 8-10 weeks with stent removal  --PT following for mobility    More than 50% of the time documented was spent in face-to-face contact with the patient and in the care of the patient on the floor/unit where the patient is located. Richardson Mullins NP          Lungs:  clear  Heart:  RRR with no Murmur/Rubs/Gallops    Additional Comments:  Stable from respiratory stand point, maybe going home today, no RA,will sign of     I have spoken with and examined the patient. I agree with the above assessment and plan as documented.     Harrison Kerns MD

## 2017-04-21 NOTE — PROGRESS NOTES
END OF SHIFT NOTE:    INTAKE/OUTPUT  04/20 0701 - 04/21 0700  In: 1329 [I.V.:1229]  Out: 3414 [Urine:1420; Drains:395]  Voiding: YES  Catheter: NO  Drain:   Drain 04/16/17 Left Abdomen (Active)   Site Assessment Clean, dry, & intact 4/21/2017  6:58 AM   Dressing Status Clean, dry, & intact 4/21/2017  6:58 AM   Status Patent;Draining 4/21/2017  6:58 AM   Drainage Description Green 4/21/2017  6:58 AM   Intake (ml) 90 ml 4/21/2017  3:20 AM   Output (ml) 0 ml 4/21/2017  1:50 AM       Drain 04/16/17 Right Abdomen (Active)   Site Assessment Clean, dry, & intact 4/21/2017  6:58 AM   Dressing Status Clean, dry, & intact 4/21/2017  6:58 AM   Status Patent;Draining 4/21/2017  6:58 AM   Drainage Description Green 4/21/2017  6:58 AM   Intake (ml) 10 ml 4/21/2017  3:20 AM   Output (ml) 0 ml 4/21/2017  1:50 AM               Flatus: Patient does have flatus present. Stool:  0 occurrences. Characteristics:  Stool Assessment  Stool Color: Brown  Stool Appearance: Soft  Stool Amount: Large  Stool Source/Status: Rectum    Emesis: 0 occurrences. Characteristics:        VITAL SIGNS  Patient Vitals for the past 12 hrs:   Temp Pulse Resp BP SpO2   04/21/17 0320 98 °F (36.7 °C) 82 16 138/62 95 %   04/20/17 2310 98.2 °F (36.8 °C) 97 16 128/65 95 %   04/20/17 1930 98 °F (36.7 °C) 92 16 137/71 94 %       Pain Assessment  Pain Intensity 1: 0 (04/21/17 0658)  Pain Location 1: Generalized  Pain Intervention(s) 1: Medication (see MAR)  Patient Stated Pain Goal: 0    Ambulating  Yes    Shift report given to oncoming nurse at the bedside.     Velma Rutledge RN

## 2017-04-22 VITALS
OXYGEN SATURATION: 95 % | HEIGHT: 65 IN | HEART RATE: 82 BPM | RESPIRATION RATE: 18 BRPM | BODY MASS INDEX: 27.56 KG/M2 | WEIGHT: 165.4 LBS | SYSTOLIC BLOOD PRESSURE: 143 MMHG | DIASTOLIC BLOOD PRESSURE: 68 MMHG | TEMPERATURE: 97.8 F

## 2017-04-22 LAB
ANION GAP BLD CALC-SCNC: 9 MMOL/L (ref 7–16)
BUN SERPL-MCNC: 13 MG/DL (ref 8–23)
CALCIUM SERPL-MCNC: 7.8 MG/DL (ref 8.3–10.4)
CHLORIDE SERPL-SCNC: 111 MMOL/L (ref 98–107)
CO2 SERPL-SCNC: 27 MMOL/L (ref 21–32)
CREAT SERPL-MCNC: 0.81 MG/DL (ref 0.6–1)
GLUCOSE SERPL-MCNC: 128 MG/DL (ref 65–100)
POTASSIUM SERPL-SCNC: 4.3 MMOL/L (ref 3.5–5.1)
SODIUM SERPL-SCNC: 147 MMOL/L (ref 136–145)

## 2017-04-22 PROCEDURE — 36415 COLL VENOUS BLD VENIPUNCTURE: CPT | Performed by: SURGERY

## 2017-04-22 PROCEDURE — 74011250637 HC RX REV CODE- 250/637: Performed by: NURSE PRACTITIONER

## 2017-04-22 PROCEDURE — 80048 BASIC METABOLIC PNL TOTAL CA: CPT | Performed by: SURGERY

## 2017-04-22 PROCEDURE — 74011250637 HC RX REV CODE- 250/637: Performed by: SURGERY

## 2017-04-22 RX ORDER — OXYCODONE AND ACETAMINOPHEN 5; 325 MG/1; MG/1
TABLET ORAL
Qty: 30 TAB | Refills: 0 | Status: SHIPPED
Start: 2017-04-22 | End: 2017-06-22

## 2017-04-22 RX ORDER — AMOXICILLIN AND CLAVULANATE POTASSIUM 875; 125 MG/1; MG/1
1 TABLET, FILM COATED ORAL 2 TIMES DAILY
Qty: 14 TAB | Refills: 0 | Status: SHIPPED
Start: 2017-04-22 | End: 2017-04-29

## 2017-04-22 RX ORDER — ZOLPIDEM TARTRATE 5 MG/1
5 TABLET ORAL
Qty: 30 TAB | Refills: 1 | Status: SHIPPED
Start: 2017-04-22 | End: 2017-05-12

## 2017-04-22 RX ADMIN — FAMOTIDINE 20 MG: 20 TABLET ORAL at 07:55

## 2017-04-22 RX ADMIN — LEVOTHYROXINE SODIUM 75 MCG: 75 TABLET ORAL at 06:16

## 2017-04-22 NOTE — DISCHARGE INSTRUCTIONS
Infection After Surgery: Care Instructions  Your Care Instructions  After surgery, an infection is always possible. It doesn't mean that the surgery didn't go well. Because an infection can be serious, your doctor has taken steps to manage it. Your doctor checked the infection and cleaned it if necessary. He or she may have made an opening in the area so that the pus can drain out. You may have gauze in the cut so that the area will stay open and keep draining. You may need antibiotics. You will need to follow up with your doctor to make sure the infection has gone away. Follow-up care is a key part of your treatment and safety. Be sure to make and go to all appointments, and call your doctor if you are having problems. It's also a good idea to know your test results and keep a list of the medicines you take. How can you care for yourself at home? · Make sure your surgeon knows that you saw a doctor about the infection. · If your doctor prescribed antibiotics, take them as directed. Do not stop taking them just because you feel better. You need to take the full course of antibiotics. · Ask your doctor if you can take an over-the-counter pain medicine, such as acetaminophen (Tylenol), ibuprofen (Advil, Motrin), or naproxen (Aleve). Be safe with medicines. Read and follow all instructions on the label. · Do not take two or more pain medicines at the same time unless the doctor told you to. Many pain medicines have acetaminophen, which is Tylenol. Too much acetaminophen (Tylenol) can be harmful. · Prop up the area on a pillow anytime you sit or lie down during the next 3 days. Try to keep it above the level of your heart. This will help reduce swelling. · Keep the skin clean and dry. · If you have a bandage, keep it clean and dry. · You may have a dressing over the cut (incision). A dressing helps the incision heal and protects it. Your doctor will tell you how to take care of this.  You can expect drainage from the wound. · If your doctor told you how to care for your incision, follow your doctor's instructions. If you did not get instructions, follow this general advice:  ¨ Wash around the incision with clean water 2 times a day. Don't use hydrogen peroxide or alcohol, which can slow healing. When should you call for help? Call your doctor now or seek immediate medical care if:  · You have signs that your infection is getting worse, such as:  ¨ Increased pain, swelling, warmth, or redness in the area. ¨ Red streaks leading from the area. ¨ Pus draining from the wound. ¨ A new or higher fever. Watch closely for changes in your health, and be sure to contact your doctor if you have any problems. Where can you learn more? Go to http://renzo-merced.info/. Enter C340 in the search box to learn more about \"Infection After Surgery: Care Instructions. \"  Current as of: May 27, 2016  Content Version: 11.2  © 7181-0831 [employer]. Care instructions adapted under license by [employer] (which disclaims liability or warranty for this information). If you have questions about a medical condition or this instruction, always ask your healthcare professional. [place] [address] any warranty or liability for your use of this information. Sepsis: Care Instructions  Your Care Instructions  Sepsis is an infection that has spread throughout your body. It is a life-threatening condition and often causes extremely low blood pressure. This can lead to problems with many different organs. The cause of sepsis is not always clear, but it can happen as part of a long-term or sudden illness. Sometimes even a mild illness can lead to sepsis. Follow-up care is a key part of your treatment and safety. Be sure to make and go to all appointments, and call your doctor if you are having problems.  Its also a good idea to know your test results and keep a list of the medicines you take. How can you care for yourself at home? · If your doctor prescribed antibiotics, take them as directed. Do not stop taking them just because you feel better. You need to take the full course of antibiotics. · Drink plenty of fluids, enough so that your urine is light yellow or clear like water. Choose water or caffeine-free clear liquids until you feel better. If you have kidney, heart, or liver disease and have to limit fluids, talk with your doctor before you increase your fluid intake. You can try rehydration drinks, such as Gatorade or Powerade. · Do not drink alcohol. · Eat a healthy diet. Include fruits, vegetables, and whole grains in your diet every day. · Walking is an easy way to get exercise. Gradually increase the amount you walk every day. Make sure your doctor knows that you are starting an exercise program.  · Do not smoke or use other tobacco products. If you need help quitting, talk to your doctor about stop-smoking programs and medicines. These can increase your chances of quitting for good. When should you call for help? Call 911 anytime you think you may need emergency care. For example, call if:  · You passed out (lost consciousness). Call your doctor now or seek immediate medical care if:  · You have a fever or chills. · You have cool, pale, or clammy skin. · You are dizzy or lightheaded, or you feel like you may faint. · You have any new symptoms, such as a cough, pain in one part of your body, or urinary problems. Watch closely for changes in your health, and be sure to contact your doctor if:  · You do not get better as expected. Where can you learn more? Go to http://renzo-merced.info/. Enter A461 in the search box to learn more about \"Sepsis: Care Instructions. \"  Current as of: May 27, 2016  Content Version: 11.2  © 1631-1485 [employer].  Care instructions adapted under license by [employer] (which disclaims liability or warranty for this information). If you have questions about a medical condition or this instruction, always ask your healthcare professional. [place] [address] any warranty or liability for your use of this information.

## 2017-04-22 NOTE — PROGRESS NOTES
General Surgery Progress Note    4/22/2017    Admit Date: 4/15/2017    Subjective:     Surgery  Patient did not go home yesterday due to her hypokalemia. She received several supplements, the last one finishing around 9 PM. She is eating, voiding, moving her bowels and ambulating. She feels \"tired\" when she walks. Objective:     Visit Vitals    /77 (BP 1 Location: Right arm, BP Patient Position: At rest)    Pulse 88    Temp 98 °F (36.7 °C)    Resp 18    Ht 5' 5\" (1.651 m)    Wt 165 lb 6.4 oz (75 kg)    SpO2 96%    Breastfeeding No    BMI 27.52 kg/m2         Intake/Output Summary (Last 24 hours) at 04/22/17 0800  Last data filed at 04/22/17 0355   Gross per 24 hour   Intake              203 ml   Output              490 ml   Net             -287 ml        EXAM:  ABD soft, no significant tenderness to palpation, active BS'S.         Data Review    Recent Results (from the past 24 hour(s))   POTASSIUM    Collection Time: 04/21/17  3:10 PM   Result Value Ref Range    Potassium 3.3 (L) 3.5 - 5.1 mmol/L        Hospital Problems  Date Reviewed: 4/21/2017          Codes Class Noted POA    Edema of both legs ICD-10-CM: R60.0  ICD-9-CM: 782.3  4/20/2017 Unknown        Anemia ICD-10-CM: D64.9  ICD-9-CM: 285.9  4/16/2017 Yes        * (Principal)Severe sepsis with septic shock ([facility] ICD-10-CM: A41.9, R65.21  ICD-9-CM: 038.9, 995.92, 785.52  4/15/2017 Yes        Ascites ICD-10-CM: R18.8  ICD-9-CM: 789.59  4/15/2017 Yes        Abdominal distension ICD-10-CM: R14.0  ICD-9-CM: 787.3  4/15/2017 Yes        Hypotension ICD-10-CM: I95.9  ICD-9-CM: 458.9  4/15/2017 Yes        S/P laparoscopic cholecystectomy ICD-10-CM: Z90.49  ICD-9-CM: V45.89  4/15/2017 Yes    Overview Signed 4/15/2017  9:07 PM by Gavino Mckenzie DO     3 weeks ago by Dr. Pam Ríos             OMERO (acute kidney injury) [facility]) ICD-10-CM: N17.9  ICD-9-CM: 584.9  4/15/2017 Yes        Protein calorie malnutrition ([facility] ICD-10-CM: E46  ICD-9-CM: 263.9 4/15/2017 Yes    Overview Signed 4/15/2017 11:48 PM by Karyna Regalado MD     With Albumin < 2 currently. 1. Check potassium. STAT level ordered. 2. Low fat diet. 3. Possible discharge later today.     Lorenzo Peterson MD.

## 2017-04-22 NOTE — DISCHARGE SUMMARY
Physician Discharge Summary     Patient ID:  Surya Parks  [id number]  61 y.o.  1940    Admit Date: 4/15/2017     H&P: Surya Parks is a 68 y.o.  female who presented to the ED 4/15/17 with c/o fatigue, decreased appetite, and abdominal distention. She is s/p lap patrice on 3/24/17 with Dr. Brian Schofield. Path showed:  DIAGNOSIS GALLBLADDER: CHRONIC CHOLECYSTITIS. Electronically signed out on 3/27/2017 08:26 by Wei Tolliver MD   Microscopic Description   Sections of gallbladder show scattered Rokitansky-Aschoff sinuses and chronic inflammatory cells. No dysplasia is seen. Dr. Bay Felix concurs.      She saw Dr. Brian Schofield post-op last week. Pt states the symptoms started shortly after surgery and have worsened. She reports many years of daily alcohol use up until about 1 month ago. She denies fever, chills, CP, or emesis. Patient s/p ERCP with stent placement 4/16/17 for bile leak. Also has two drains in place in abdomen. Denies nausea, vomiting, abdominal pain. Pt has progressed well. Both drains have been removed. Pt is tolerating GI soft diet without nausea or vomiting. Pt denies pain. Pt is having BM's and is ambulating without difficulty at the time of discharge.      Discharge Date: 4/22/2017    * Admission Diagnoses: Sepsis [facility])  Bile leak    * Discharge Diagnoses:    Hospital Problems as of 4/22/2017  Date Reviewed: 4/21/2017          Codes Class Noted - Resolved POA    Edema of both legs ICD-10-CM: R60.0  ICD-9-CM: 782.3  4/20/2017 - Present Unknown        Anemia ICD-10-CM: D64.9  ICD-9-CM: 285.9  4/16/2017 - Present Yes        * (Principal)Severe sepsis with septic shock ([facility] [address] ICD-10-CM: A41.9, R65.21  ICD-9-CM: 038.9, 995.92, 785.52  4/15/2017 - Present Yes        Ascites ICD-10-CM: R18.8  ICD-9-CM: 789.59  4/15/2017 - Present Yes        Abdominal distension ICD-10-CM: R14.0  ICD-9-CM: 787.3  4/15/2017 - Present Yes        Hypotension ICD-10-CM: I95.9  ICD-9-CM: 458.9 4/15/2017 - Present Yes        S/P laparoscopic cholecystectomy ICD-10-CM: Z90.49  ICD-9-CM: V45.89  4/15/2017 - Present Yes    Overview Signed 4/15/2017  9:07 PM by Jon Hinkle DO     3 weeks ago by Dr. Damián JAMIL (acute kidney injury) [facility]) ICD-10-CM: N17.9  ICD-9-CM: 584.9  4/15/2017 - Present Yes        Protein calorie malnutrition ([facility] [address] ICD-10-CM: E46  ICD-9-CM: 263.9  4/15/2017 - Present Yes    Overview Signed 4/15/2017 11:48 PM by Balbir Chapa MD     With Albumin < 2 currently. RESOLVED: Leukocytosis ICD-10-CM: Z23.548  ICD-9-CM: 288.60  4/15/2017 - 4/19/2017 Yes               Admission Condition: Fair    * Discharge Condition: good    * Procedures: Procedure(s) with comments:  ENDOSCOPIC RETROGRADE CHOLANGIOPANCREATOGRAPHY - ERCP  ENDOSCOPIC SPHINCTEROTOMY - Sphincterotomy  ENDOSCOPIC STONE EXTRACTION/BALLOON SWEEP - Balloon sweep    BILIARY STENT PLACEMENT - Stent placement    * Hospital Course:   Normal hospital course for this procedure. Consults: None    Significant Diagnostic Studies: labs and radiology    * Disposition: Home    Discharge Medications:   Current Discharge Medication List      START taking these medications    Details   oxyCODONE-acetaminophen (PERCOCET) 5-325 mg per tablet 1-2 tabs by mouth every four hours prn pain  Qty: 30 Tab, Refills: 0      amoxicillin-clavulanate (AUGMENTIN) 875-125 mg per tablet Take 1 Tab by mouth two (2) times a day for 7 days. Qty: 14 Tab, Refills: 0         CONTINUE these medications which have CHANGED    Details   zolpidem (AMBIEN) 5 mg tablet Take 1 Tab by mouth nightly as needed for Sleep. Max Daily Amount: 5 mg. Qty: 30 Tab, Refills: 1         CONTINUE these medications which have NOT CHANGED    Details   raNITIdine (ZANTAC) 150 mg tablet Take 1 Tab by mouth two (2) times a day.   Qty: 60 Tab, Refills: 0    Associated Diagnoses: Gastroesophageal reflux disease without esophagitis      metFORMIN (GLUCOPHAGE) 500 mg tablet Take 500 mg by mouth daily. benazepril-hydroCHLOROthiazide (LOTENSIN HCT) 20-12.5 mg per tablet Take 1 Tab by mouth daily. Qty: 90 Tab, Refills: 1    Associated Diagnoses: Benign essential hypertension      fenofibrate (LOFIBRA) 160 mg tablet TAKE 1 TABLET BY MOUTH DAILY  Qty: 90 Tab, Refills: 0    Associated Diagnoses: Hypercholesterolemia      raloxifene (EVISTA) 60 mg tablet Take 1 Tab by mouth daily. Qty: 90 Tab, Refills: 3    Associated Diagnoses: Postmenopausal; History of breast cancer      rosuvastatin (CRESTOR) 10 mg tablet Take 1 Tab by mouth nightly. Qty: 90 Tab, Refills: 3    Associated Diagnoses: Hypercholesterolemia      levothyroxine (SYNTHROID) 75 mcg tablet Take 1 Tab by mouth Daily (before breakfast). Qty: 90 Tab, Refills: 3    Associated Diagnoses: Hypothyroidism, acquired      cholecalciferol (VITAMIN D3) 1,000 unit tablet Take  by mouth daily. Associated Diagnoses: Vitamin D deficiency      DOCOSAHEXANOIC ACID/EPA (FISH OIL PO) Take 1 Tab by mouth daily. folic acid 981 mcg tablet Take 800 mcg by mouth daily. * Follow-up Care/Patient Instructions: Activity: Activity as tolerated  Diet: Soft Diet  Wound Care: Keep wound clean and dry    Follow-up Information     Follow up With Details Comments [address]repo On 5/7/2017 at [address] [facility]  Gastroenterology [facility] [address] [facility] Tristin Nichols MD Go to  [address] [place], [place] [address]nathanael Virgil, WIL [address]  [phone]          Call office on Monday to schedule a follow up appointment with Dr. Frederick Cherry in 1 week. [phone].     Signed:  LINDEN Mcdonald  4/22/2017  10:29 AM

## 2017-04-22 NOTE — PROGRESS NOTES
Discharge instructions reviewed with patient and family, all questions answered. Prescriptions given.

## 2017-04-23 ENCOUNTER — PATIENT OUTREACH (OUTPATIENT)
Dept: CASE MANAGEMENT | Age: 77
End: 2017-04-23

## 2017-04-23 NOTE — PROGRESS NOTES
Second outreach attempt to patient was unsuccessful. Third outreach attempt will be made within 5 business days. This note will not be viewable in [address]

## 2017-04-23 NOTE — PROGRESS NOTES
Initial outreach attempt to patient was unsuccessful. Second outreach attempt will be made within 24 hours. This note will not be viewable in [address]

## 2017-04-24 ENCOUNTER — HOME CARE VISIT (OUTPATIENT)
Dept: SCHEDULING | Facility: HOME HEALTH | Age: 77
End: 2017-04-24
Payer: MEDICARE

## 2017-04-24 ENCOUNTER — TELEPHONE (OUTPATIENT)
Dept: HOME HEALTH SERVICES | Facility: HOME HEALTH | Age: 77
End: 2017-04-24

## 2017-04-24 VITALS
OXYGEN SATURATION: 94 % | DIASTOLIC BLOOD PRESSURE: 74 MMHG | HEART RATE: 82 BPM | SYSTOLIC BLOOD PRESSURE: 140 MMHG | TEMPERATURE: 95.7 F | RESPIRATION RATE: 16 BRPM

## 2017-04-24 PROCEDURE — G0299 HHS/HOSPICE OF RN EA 15 MIN: HCPCS

## 2017-04-24 PROCEDURE — 3331090001 HH PPS REVENUE CREDIT

## 2017-04-24 PROCEDURE — 400013 HH SOC

## 2017-04-24 PROCEDURE — 3331090002 HH PPS REVENUE DEBIT

## 2017-04-24 NOTE — TELEPHONE ENCOUNTER
[address] [place] [address] Lois Lemon Pharmacist consult. Ms. Derrick Petty was discharged Saturday prior to my consult. I have spoken with her today and explained my part of the [facility] team.  We reviewed her discharge medications. She has not taken any pain med yet, but will if needed. She has her antibiotic and is taking per orders. She has a prescription at the drugstore for Ambien, but her insurance requires special authorization for anyone over 72, so is waiting on that. Her BS has been 89 each morning and she wonders if still needs metformin. She has MD appointment on Thursday and I told her to ask about that. She also has swelling in legs and feet, but not hands. She was on furosemide in the hospital, but stopped when potassium got too low. She said the [facility] nurse was coming around noon. I explained that the nurse would evaluate her condition and could ask MD about a diuretic if she felt it was appropriate. I also told her that the Lotensin HCT she is taking for her BP has a diuretic in it. I gave her my cell phone number and asked her to call with any med questions or issues.   4/24/17 7632

## 2017-04-25 ENCOUNTER — HOME CARE VISIT (OUTPATIENT)
Dept: SCHEDULING | Facility: HOME HEALTH | Age: 77
End: 2017-04-25
Payer: MEDICARE

## 2017-04-25 ENCOUNTER — TELEPHONE (OUTPATIENT)
Dept: HOME HEALTH SERVICES | Facility: HOME HEALTH | Age: 77
End: 2017-04-25

## 2017-04-25 VITALS
DIASTOLIC BLOOD PRESSURE: 80 MMHG | HEART RATE: 91 BPM | OXYGEN SATURATION: 97 % | SYSTOLIC BLOOD PRESSURE: 130 MMHG | RESPIRATION RATE: 16 BRPM

## 2017-04-25 PROCEDURE — 3331090001 HH PPS REVENUE CREDIT

## 2017-04-25 PROCEDURE — 3331090002 HH PPS REVENUE DEBIT

## 2017-04-25 PROCEDURE — G0151 HHCP-SERV OF PT,EA 15 MIN: HCPCS

## 2017-04-25 NOTE — TELEPHONE ENCOUNTER
Ms. Bell Hidden called to see if [facility] nurse had gotten her MD about a prescription for furosemide for her leg swelling. I told her I did not know, but would email the nurse for an update. She also wanted to know if Medicare would pay for a new mattress for her bed. She has Medicare Part B. I told her that I did not know, but would pose the question to Home Care Nurse to see if she knew or knew how we could find out. She said she would appreciate any help we could give her. I did email her admitting nurse who saw her yesterday to get updates.

## 2017-04-26 PROCEDURE — 3331090002 HH PPS REVENUE DEBIT

## 2017-04-26 PROCEDURE — 3331090001 HH PPS REVENUE CREDIT

## 2017-04-26 NOTE — TELEPHONE ENCOUNTER
Called to update patient. SN is in staff meeting until around 10:30. I recommended she call Dr. Darby Jacome herself and explain the swelling. She will see Dr. Darby Jacome tomorrow anyway, so she said she would just wait to talk to him until appointment. She said she spoke with her PCP today and will go see on Friday. She is not taking the metformin due to BS being around 89. She said her BP is good, so thinks she does not need the Lotensen. She is going to have PCP review all meds and go from there. I asked her to call me back with any other med issues.

## 2017-04-27 PROCEDURE — 3331090001 HH PPS REVENUE CREDIT

## 2017-04-27 PROCEDURE — 3331090002 HH PPS REVENUE DEBIT

## 2017-04-28 ENCOUNTER — HOME CARE VISIT (OUTPATIENT)
Dept: SCHEDULING | Facility: HOME HEALTH | Age: 77
End: 2017-04-28
Payer: MEDICARE

## 2017-04-28 ENCOUNTER — PATIENT OUTREACH (OUTPATIENT)
Dept: CASE MANAGEMENT | Age: 77
End: 2017-04-28

## 2017-04-28 ENCOUNTER — HOME CARE VISIT (OUTPATIENT)
Dept: HOME HEALTH SERVICES | Facility: HOME HEALTH | Age: 77
End: 2017-04-28
Payer: MEDICARE

## 2017-04-28 PROCEDURE — 3331090002 HH PPS REVENUE DEBIT

## 2017-04-28 PROCEDURE — G0299 HHS/HOSPICE OF RN EA 15 MIN: HCPCS

## 2017-04-28 PROCEDURE — 3331090001 HH PPS REVENUE CREDIT

## 2017-04-28 NOTE — PROGRESS NOTES
Third outreach attempt to patient was unsuccessful. Will close case due to unsuccessful outreach attempts. This note will not be viewable in [address]

## 2017-04-29 VITALS
DIASTOLIC BLOOD PRESSURE: 68 MMHG | RESPIRATION RATE: 18 BRPM | SYSTOLIC BLOOD PRESSURE: 160 MMHG | HEART RATE: 92 BPM | OXYGEN SATURATION: 98 %

## 2017-04-29 PROCEDURE — 3331090001 HH PPS REVENUE CREDIT

## 2017-04-29 PROCEDURE — 3331090002 HH PPS REVENUE DEBIT

## 2017-04-30 PROCEDURE — 3331090002 HH PPS REVENUE DEBIT

## 2017-04-30 PROCEDURE — 3331090001 HH PPS REVENUE CREDIT

## 2017-05-01 PROCEDURE — 3331090002 HH PPS REVENUE DEBIT

## 2017-05-01 PROCEDURE — 3331090001 HH PPS REVENUE CREDIT

## 2017-05-02 ENCOUNTER — TELEPHONE (OUTPATIENT)
Dept: HOME HEALTH SERVICES | Facility: HOME HEALTH | Age: 77
End: 2017-05-02

## 2017-05-02 ENCOUNTER — HOME CARE VISIT (OUTPATIENT)
Dept: SCHEDULING | Facility: HOME HEALTH | Age: 77
End: 2017-05-02
Payer: MEDICARE

## 2017-05-02 ENCOUNTER — HOME CARE VISIT (OUTPATIENT)
Dept: HOME HEALTH SERVICES | Facility: HOME HEALTH | Age: 77
End: 2017-05-02
Payer: MEDICARE

## 2017-05-02 PROCEDURE — 3331090001 HH PPS REVENUE CREDIT

## 2017-05-02 PROCEDURE — G0299 HHS/HOSPICE OF RN EA 15 MIN: HCPCS

## 2017-05-02 PROCEDURE — G0157 HHC PT ASSISTANT EA 15: HCPCS

## 2017-05-02 PROCEDURE — 3331090002 HH PPS REVENUE DEBIT

## 2017-05-02 NOTE — TELEPHONE ENCOUNTER
Mr. Derrick Petty is doing better. She saw MD last week and had blood work. Was called yesterday and told it looked good and that she is getting back to normal.  She was prescribed 3 full meals a day with red meat, chicken and vegetables in order to get her strength back. MD did not give her any lasix as was waiting to see what CR was. She said her right leg swelling is much better. She will have her stint removed [address]. Her [employer] is bring food by today, so a busy day. No med questions at this time. I asked her to call with any med issues.

## 2017-05-03 VITALS
DIASTOLIC BLOOD PRESSURE: 70 MMHG | HEART RATE: 80 BPM | TEMPERATURE: 96.3 F | SYSTOLIC BLOOD PRESSURE: 140 MMHG | RESPIRATION RATE: 16 BRPM

## 2017-05-03 VITALS
DIASTOLIC BLOOD PRESSURE: 62 MMHG | TEMPERATURE: 97 F | RESPIRATION RATE: 16 BRPM | OXYGEN SATURATION: 98 % | SYSTOLIC BLOOD PRESSURE: 142 MMHG | HEART RATE: 90 BPM

## 2017-05-03 PROCEDURE — 3331090001 HH PPS REVENUE CREDIT

## 2017-05-03 PROCEDURE — 3331090002 HH PPS REVENUE DEBIT

## 2017-05-04 ENCOUNTER — HOME CARE VISIT (OUTPATIENT)
Dept: SCHEDULING | Facility: HOME HEALTH | Age: 77
End: 2017-05-04
Payer: MEDICARE

## 2017-05-04 PROCEDURE — 3331090002 HH PPS REVENUE DEBIT

## 2017-05-04 PROCEDURE — G0157 HHC PT ASSISTANT EA 15: HCPCS

## 2017-05-04 PROCEDURE — 3331090001 HH PPS REVENUE CREDIT

## 2017-05-05 VITALS
RESPIRATION RATE: 18 BRPM | SYSTOLIC BLOOD PRESSURE: 152 MMHG | TEMPERATURE: 97.3 F | HEART RATE: 80 BPM | DIASTOLIC BLOOD PRESSURE: 80 MMHG

## 2017-05-05 PROCEDURE — 3331090001 HH PPS REVENUE CREDIT

## 2017-05-05 PROCEDURE — 3331090002 HH PPS REVENUE DEBIT

## 2017-05-06 PROCEDURE — 3331090001 HH PPS REVENUE CREDIT

## 2017-05-06 PROCEDURE — 3331090002 HH PPS REVENUE DEBIT

## 2017-05-07 PROCEDURE — 3331090002 HH PPS REVENUE DEBIT

## 2017-05-07 PROCEDURE — 3331090001 HH PPS REVENUE CREDIT

## 2017-05-08 PROCEDURE — 3331090002 HH PPS REVENUE DEBIT

## 2017-05-08 PROCEDURE — 3331090001 HH PPS REVENUE CREDIT

## 2017-05-09 ENCOUNTER — TELEPHONE (OUTPATIENT)
Dept: HOME HEALTH SERVICES | Facility: HOME HEALTH | Age: 77
End: 2017-05-09

## 2017-05-09 ENCOUNTER — HOME CARE VISIT (OUTPATIENT)
Dept: SCHEDULING | Facility: HOME HEALTH | Age: 77
End: 2017-05-09
Payer: MEDICARE

## 2017-05-09 PROCEDURE — 3331090002 HH PPS REVENUE DEBIT

## 2017-05-09 PROCEDURE — 3331090001 HH PPS REVENUE CREDIT

## 2017-05-09 PROCEDURE — G0157 HHC PT ASSISTANT EA 15: HCPCS

## 2017-05-09 NOTE — TELEPHONE ENCOUNTER
Yaima David said she is having a really good day today. She worked with PT this morning and her BP was up some. She called MD and was told to take BP med. An hour later it was down. She will be seeing Dr. Josie Butterfield on Friday and will see then if she is to resume her BP med. No new medications and no questions at this time. I told her to call after visit on Friday if med changes. I will call her first of next week.

## 2017-05-10 ENCOUNTER — HOME CARE VISIT (OUTPATIENT)
Dept: SCHEDULING | Facility: HOME HEALTH | Age: 77
End: 2017-05-10
Payer: MEDICARE

## 2017-05-10 VITALS
DIASTOLIC BLOOD PRESSURE: 84 MMHG | TEMPERATURE: 98 F | RESPIRATION RATE: 16 BRPM | SYSTOLIC BLOOD PRESSURE: 170 MMHG | HEART RATE: 101 BPM

## 2017-05-10 PROCEDURE — 3331090002 HH PPS REVENUE DEBIT

## 2017-05-10 PROCEDURE — 3331090001 HH PPS REVENUE CREDIT

## 2017-05-10 PROCEDURE — G0299 HHS/HOSPICE OF RN EA 15 MIN: HCPCS

## 2017-05-11 VITALS
HEART RATE: 96 BPM | DIASTOLIC BLOOD PRESSURE: 64 MMHG | RESPIRATION RATE: 18 BRPM | TEMPERATURE: 98.3 F | SYSTOLIC BLOOD PRESSURE: 142 MMHG | OXYGEN SATURATION: 97 %

## 2017-05-11 PROCEDURE — 3331090001 HH PPS REVENUE CREDIT

## 2017-05-11 PROCEDURE — 3331090002 HH PPS REVENUE DEBIT

## 2017-05-12 ENCOUNTER — HOME CARE VISIT (OUTPATIENT)
Dept: HOME HEALTH SERVICES | Facility: HOME HEALTH | Age: 77
End: 2017-05-12
Payer: MEDICARE

## 2017-05-12 PROCEDURE — 3331090002 HH PPS REVENUE DEBIT

## 2017-05-12 PROCEDURE — 3331090001 HH PPS REVENUE CREDIT

## 2017-05-13 PROCEDURE — 3331090001 HH PPS REVENUE CREDIT

## 2017-05-13 PROCEDURE — 3331090002 HH PPS REVENUE DEBIT

## 2017-05-14 PROCEDURE — 3331090002 HH PPS REVENUE DEBIT

## 2017-05-14 PROCEDURE — 3331090001 HH PPS REVENUE CREDIT

## 2017-05-15 ENCOUNTER — TELEPHONE (OUTPATIENT)
Dept: HOME HEALTH SERVICES | Facility: HOME HEALTH | Age: 77
End: 2017-05-15

## 2017-05-15 PROCEDURE — 3331090001 HH PPS REVENUE CREDIT

## 2017-05-15 PROCEDURE — 3331090002 HH PPS REVENUE DEBIT

## 2017-05-16 ENCOUNTER — HOME CARE VISIT (OUTPATIENT)
Dept: SCHEDULING | Facility: HOME HEALTH | Age: 77
End: 2017-05-16
Payer: MEDICARE

## 2017-05-16 VITALS
SYSTOLIC BLOOD PRESSURE: 116 MMHG | TEMPERATURE: 96.3 F | DIASTOLIC BLOOD PRESSURE: 62 MMHG | RESPIRATION RATE: 19 BRPM | HEART RATE: 82 BPM

## 2017-05-16 PROCEDURE — G0151 HHCP-SERV OF PT,EA 15 MIN: HCPCS

## 2017-05-16 PROCEDURE — 3331090002 HH PPS REVENUE DEBIT

## 2017-05-16 PROCEDURE — G0299 HHS/HOSPICE OF RN EA 15 MIN: HCPCS

## 2017-05-16 PROCEDURE — 3331090001 HH PPS REVENUE CREDIT

## 2017-05-17 VITALS
RESPIRATION RATE: 16 BRPM | OXYGEN SATURATION: 98 % | SYSTOLIC BLOOD PRESSURE: 116 MMHG | DIASTOLIC BLOOD PRESSURE: 50 MMHG | HEART RATE: 88 BPM

## 2017-05-17 PROCEDURE — 3331090002 HH PPS REVENUE DEBIT

## 2017-05-17 PROCEDURE — 3331090001 HH PPS REVENUE CREDIT

## 2017-05-18 ENCOUNTER — HOME CARE VISIT (OUTPATIENT)
Dept: SCHEDULING | Facility: HOME HEALTH | Age: 77
End: 2017-05-18
Payer: MEDICARE

## 2017-05-18 PROCEDURE — 3331090003 HH PPS REVENUE ADJ

## 2017-05-18 PROCEDURE — 3331090002 HH PPS REVENUE DEBIT

## 2017-05-18 PROCEDURE — G0299 HHS/HOSPICE OF RN EA 15 MIN: HCPCS

## 2017-05-18 PROCEDURE — 3331090001 HH PPS REVENUE CREDIT

## 2017-05-19 VITALS
RESPIRATION RATE: 16 BRPM | TEMPERATURE: 95.5 F | OXYGEN SATURATION: 97 % | DIASTOLIC BLOOD PRESSURE: 50 MMHG | HEART RATE: 94 BPM | SYSTOLIC BLOOD PRESSURE: 110 MMHG

## 2017-05-19 PROCEDURE — 3331090001 HH PPS REVENUE CREDIT

## 2017-05-19 PROCEDURE — 3331090002 HH PPS REVENUE DEBIT

## 2017-05-20 PROCEDURE — 3331090001 HH PPS REVENUE CREDIT

## 2017-05-20 PROCEDURE — 3331090002 HH PPS REVENUE DEBIT

## 2017-05-21 PROCEDURE — 3331090001 HH PPS REVENUE CREDIT

## 2017-05-21 PROCEDURE — 3331090002 HH PPS REVENUE DEBIT

## 2017-05-22 PROCEDURE — 3331090002 HH PPS REVENUE DEBIT

## 2017-05-22 PROCEDURE — 3331090001 HH PPS REVENUE CREDIT

## 2017-05-23 PROCEDURE — 3331090001 HH PPS REVENUE CREDIT

## 2017-05-23 PROCEDURE — 3331090002 HH PPS REVENUE DEBIT

## 2017-05-24 PROCEDURE — 3331090002 HH PPS REVENUE DEBIT

## 2017-05-24 PROCEDURE — 3331090001 HH PPS REVENUE CREDIT

## 2017-05-25 PROCEDURE — 3331090001 HH PPS REVENUE CREDIT

## 2017-05-25 PROCEDURE — 3331090002 HH PPS REVENUE DEBIT

## 2017-05-26 PROCEDURE — 3331090001 HH PPS REVENUE CREDIT

## 2017-05-26 PROCEDURE — 3331090002 HH PPS REVENUE DEBIT

## 2017-05-27 PROCEDURE — 3331090002 HH PPS REVENUE DEBIT

## 2017-05-27 PROCEDURE — 3331090001 HH PPS REVENUE CREDIT

## 2017-05-28 PROCEDURE — 3331090001 HH PPS REVENUE CREDIT

## 2017-05-28 PROCEDURE — 3331090002 HH PPS REVENUE DEBIT

## 2017-05-29 PROCEDURE — 3331090001 HH PPS REVENUE CREDIT

## 2017-05-29 PROCEDURE — 3331090002 HH PPS REVENUE DEBIT

## 2017-05-30 PROCEDURE — 3331090002 HH PPS REVENUE DEBIT

## 2017-05-30 PROCEDURE — 3331090001 HH PPS REVENUE CREDIT

## 2017-05-31 PROCEDURE — 3331090001 HH PPS REVENUE CREDIT

## 2017-05-31 PROCEDURE — 3331090002 HH PPS REVENUE DEBIT

## 2017-06-01 PROCEDURE — 3331090002 HH PPS REVENUE DEBIT

## 2017-06-01 PROCEDURE — 3331090001 HH PPS REVENUE CREDIT

## 2017-06-02 PROCEDURE — 3331090001 HH PPS REVENUE CREDIT

## 2017-06-02 PROCEDURE — 3331090002 HH PPS REVENUE DEBIT

## 2017-06-03 PROCEDURE — 3331090001 HH PPS REVENUE CREDIT

## 2017-06-03 PROCEDURE — 3331090002 HH PPS REVENUE DEBIT

## 2017-09-15 PROBLEM — R14.0 ABDOMINAL DISTENSION: Status: RESOLVED | Noted: 2017-04-15 | Resolved: 2017-09-15

## 2017-09-15 PROBLEM — R18.8 ASCITES: Status: RESOLVED | Noted: 2017-04-15 | Resolved: 2017-09-15

## 2017-09-15 PROBLEM — E46 PROTEIN CALORIE MALNUTRITION (HCC): Status: RESOLVED | Noted: 2017-04-15 | Resolved: 2017-09-15

## 2017-09-15 PROBLEM — N17.9 AKI (ACUTE KIDNEY INJURY) (HCC): Status: RESOLVED | Noted: 2017-04-15 | Resolved: 2017-09-15

## 2017-09-15 PROBLEM — Z86.19 HISTORY OF SEPSIS: Status: ACTIVE | Noted: 2017-04-15

## 2017-09-15 PROBLEM — D64.9 ANEMIA: Status: RESOLVED | Noted: 2017-04-16 | Resolved: 2017-09-15

## 2017-09-17 PROBLEM — I95.9 HYPOTENSION: Status: RESOLVED | Noted: 2017-04-15 | Resolved: 2017-09-17

## 2017-09-17 PROBLEM — R73.01 IMPAIRED FASTING BLOOD SUGAR: Status: ACTIVE | Noted: 2017-09-17

## 2018-01-10 ENCOUNTER — APPOINTMENT (RX ONLY)
Dept: URBAN - METROPOLITAN AREA CLINIC 23 | Facility: CLINIC | Age: 78
Setting detail: DERMATOLOGY
End: 2018-01-10

## 2018-01-10 DIAGNOSIS — L82.0 INFLAMED SEBORRHEIC KERATOSIS: ICD-10-CM

## 2018-01-10 DIAGNOSIS — L57.0 ACTINIC KERATOSIS: ICD-10-CM

## 2018-01-10 PROBLEM — L85.3 XEROSIS CUTIS: Status: ACTIVE | Noted: 2018-01-10

## 2018-01-10 PROBLEM — E13.9 OTHER SPECIFIED DIABETES MELLITUS WITHOUT COMPLICATIONS: Status: ACTIVE | Noted: 2018-01-10

## 2018-01-10 PROCEDURE — ? COUNSELING

## 2018-01-10 PROCEDURE — 17000 DESTRUCT PREMALG LESION: CPT | Mod: 59

## 2018-01-10 PROCEDURE — ? LIQUID NITROGEN

## 2018-01-10 PROCEDURE — 17003 DESTRUCT PREMALG LES 2-14: CPT

## 2018-01-10 PROCEDURE — 17110 DESTRUCTION B9 LES UP TO 14: CPT

## 2018-01-10 ASSESSMENT — LOCATION SIMPLE DESCRIPTION DERM
LOCATION SIMPLE: RIGHT LIP
LOCATION SIMPLE: RIGHT EYEBROW
LOCATION SIMPLE: LEFT CHEEK

## 2018-01-10 ASSESSMENT — LOCATION ZONE DERM
LOCATION ZONE: LIP
LOCATION ZONE: FACE

## 2018-01-10 ASSESSMENT — LOCATION DETAILED DESCRIPTION DERM
LOCATION DETAILED: RIGHT UPPER CUTANEOUS LIP
LOCATION DETAILED: LEFT CENTRAL MALAR CHEEK
LOCATION DETAILED: RIGHT CENTRAL EYEBROW

## 2018-01-10 NOTE — PROCEDURE: LIQUID NITROGEN
Add 52 Modifier (Optional): no
Detail Level: Detailed
Duration Of Freeze Thaw-Cycle (Seconds): 0
Consent: The patient's consent was obtained including but not limited to risks of crusting, scabbing, blistering, scarring, darker or lighter pigmentary change, recurrence, incomplete removal and infection.
Medical Necessity Clause: This procedure was medically necessary because the lesions that were treated were:irritated
Post-Care Instructions: I reviewed with the patient in detail post-care instructions. Patient is to wear sunprotection, and avoid picking at any of the treated lesions. Pt may apply Vaseline to crusted or scabbing areas.
Medical Necessity Information: It is in your best interest to select a reason for this procedure from the list below. All of these items fulfill various CMS LCD requirements except the new and changing color options.

## 2018-03-26 PROBLEM — R41.89 COGNITIVE DEFICITS: Status: ACTIVE | Noted: 2018-03-26

## 2018-06-12 ENCOUNTER — HOSPITAL ENCOUNTER (OUTPATIENT)
Dept: MAMMOGRAPHY | Age: 78
Discharge: HOME | End: 2018-06-12
Attending: FAMILY MEDICINE
Payer: MEDICARE

## 2018-06-12 DIAGNOSIS — M85.89 OSTEOPENIA OF MULTIPLE SITES: ICD-10-CM

## 2018-06-12 PROCEDURE — 77080 DXA BONE DENSITY AXIAL: CPT

## 2018-06-12 NOTE — PROGRESS NOTES
Please let her know that she does remain osteopenic. Please try to get 1000-1200mg of calcium per day via dietary intake or supplement if not able to get through diet. Also, please take Vit. D3 2000IU daily. Recommend dietary sources of calcium with the best sources being milk/soy milk/soy products, cottage cheese, and yogurt: need 4 servings of this in order to meet daily requirements. Recommend weight bearing exercises with walking being the best and safest for bone health.

## 2018-10-31 PROBLEM — Z85.3 HISTORY OF BREAST CANCER: Status: ACTIVE | Noted: 2018-10-31

## 2019-04-30 PROBLEM — R60.0 EDEMA OF BOTH LEGS: Status: RESOLVED | Noted: 2017-04-20 | Resolved: 2019-04-30

## 2019-04-30 PROBLEM — M85.89 OSTEOPENIA OF MULTIPLE SITES: Status: ACTIVE | Noted: 2019-04-30

## 2020-09-18 PROBLEM — R00.2 PALPITATIONS: Status: ACTIVE | Noted: 2020-09-18

## 2022-03-18 PROBLEM — R00.2 PALPITATIONS: Status: ACTIVE | Noted: 2020-09-18

## 2022-03-18 PROBLEM — Z86.19 HISTORY OF SEPSIS: Status: ACTIVE | Noted: 2017-04-15

## 2022-03-19 PROBLEM — M85.89 OSTEOPENIA OF MULTIPLE SITES: Status: ACTIVE | Noted: 2019-04-30

## 2022-03-19 PROBLEM — Z85.3 HISTORY OF BREAST CANCER: Status: ACTIVE | Noted: 2018-10-31

## 2022-03-19 PROBLEM — Z90.49 S/P LAPAROSCOPIC CHOLECYSTECTOMY: Status: ACTIVE | Noted: 2017-04-15

## 2022-03-19 PROBLEM — R41.89 COGNITIVE DEFICITS: Status: ACTIVE | Noted: 2018-03-26

## 2022-03-20 PROBLEM — R73.01 IMPAIRED FASTING BLOOD SUGAR: Status: ACTIVE | Noted: 2017-09-17

## 2022-07-29 ENCOUNTER — OFFICE VISIT (OUTPATIENT)
Dept: INTERNAL MEDICINE CLINIC | Facility: CLINIC | Age: 82
End: 2022-07-29
Payer: MEDICARE

## 2022-07-29 VITALS
WEIGHT: 149 LBS | RESPIRATION RATE: 16 BRPM | HEART RATE: 84 BPM | SYSTOLIC BLOOD PRESSURE: 128 MMHG | OXYGEN SATURATION: 98 % | HEIGHT: 65 IN | DIASTOLIC BLOOD PRESSURE: 78 MMHG | BODY MASS INDEX: 24.83 KG/M2

## 2022-07-29 DIAGNOSIS — M1A.0790 CHRONIC IDIOPATHIC GOUT INVOLVING TOE WITHOUT TOPHUS, UNSPECIFIED LATERALITY: ICD-10-CM

## 2022-07-29 DIAGNOSIS — Z00.00 MEDICARE ANNUAL WELLNESS VISIT, SUBSEQUENT: Primary | ICD-10-CM

## 2022-07-29 DIAGNOSIS — M85.89 OSTEOPENIA OF MULTIPLE SITES: ICD-10-CM

## 2022-07-29 DIAGNOSIS — I10 HTN (HYPERTENSION), BENIGN: ICD-10-CM

## 2022-07-29 DIAGNOSIS — E03.9 HYPOTHYROIDISM, ADULT: ICD-10-CM

## 2022-07-29 DIAGNOSIS — E78.2 MIXED HYPERLIPIDEMIA: ICD-10-CM

## 2022-07-29 DIAGNOSIS — F41.9 ANXIETY: ICD-10-CM

## 2022-07-29 LAB
ALBUMIN SERPL-MCNC: 3.9 G/DL (ref 3.2–4.6)
ALBUMIN/GLOB SERPL: 1 {RATIO} (ref 1.2–3.5)
ALP SERPL-CCNC: 49 U/L (ref 50–136)
ALT SERPL-CCNC: 24 U/L (ref 12–65)
ANION GAP SERPL CALC-SCNC: 8 MMOL/L (ref 7–16)
AST SERPL-CCNC: 16 U/L (ref 15–37)
BASOPHILS # BLD: 0 K/UL (ref 0–0.2)
BASOPHILS NFR BLD: 1 % (ref 0–2)
BILIRUB DIRECT SERPL-MCNC: 0.1 MG/DL
BILIRUB SERPL-MCNC: 0.4 MG/DL (ref 0.2–1.1)
BUN SERPL-MCNC: 31 MG/DL (ref 8–23)
CALCIUM SERPL-MCNC: 9.2 MG/DL (ref 8.3–10.4)
CHLORIDE SERPL-SCNC: 108 MMOL/L (ref 98–107)
CHOLEST SERPL-MCNC: 125 MG/DL
CO2 SERPL-SCNC: 24 MMOL/L (ref 21–32)
CREAT SERPL-MCNC: 1.1 MG/DL (ref 0.6–1)
DIFFERENTIAL METHOD BLD: NORMAL
EOSINOPHIL # BLD: 0.3 K/UL (ref 0–0.8)
EOSINOPHIL NFR BLD: 4 % (ref 0.5–7.8)
ERYTHROCYTE [DISTWIDTH] IN BLOOD BY AUTOMATED COUNT: 13 % (ref 11.9–14.6)
GLOBULIN SER CALC-MCNC: 3.9 G/DL (ref 2.3–3.5)
GLUCOSE SERPL-MCNC: 126 MG/DL (ref 65–100)
HCT VFR BLD AUTO: 39.1 % (ref 35.8–46.3)
HDLC SERPL-MCNC: 38 MG/DL (ref 40–60)
HDLC SERPL: 3.3 {RATIO}
HGB BLD-MCNC: 12.4 G/DL (ref 11.7–15.4)
IMM GRANULOCYTES # BLD AUTO: 0 K/UL (ref 0–0.5)
IMM GRANULOCYTES NFR BLD AUTO: 0 % (ref 0–5)
LDLC SERPL CALC-MCNC: 55.2 MG/DL
LYMPHOCYTES # BLD: 1.3 K/UL (ref 0.5–4.6)
LYMPHOCYTES NFR BLD: 20 % (ref 13–44)
MCH RBC QN AUTO: 29.1 PG (ref 26.1–32.9)
MCHC RBC AUTO-ENTMCNC: 31.7 G/DL (ref 31.4–35)
MCV RBC AUTO: 91.8 FL (ref 79.6–97.8)
MONOCYTES # BLD: 0.7 K/UL (ref 0.1–1.3)
MONOCYTES NFR BLD: 12 % (ref 4–12)
NEUTS SEG # BLD: 3.9 K/UL (ref 1.7–8.2)
NEUTS SEG NFR BLD: 63 % (ref 43–78)
NRBC # BLD: 0 K/UL (ref 0–0.2)
PLATELET # BLD AUTO: 302 K/UL (ref 150–450)
PMV BLD AUTO: 10.9 FL (ref 9.4–12.3)
POTASSIUM SERPL-SCNC: 4.6 MMOL/L (ref 3.5–5.1)
PROT SERPL-MCNC: 7.8 G/DL (ref 6.3–8.2)
RBC # BLD AUTO: 4.26 M/UL (ref 4.05–5.2)
SODIUM SERPL-SCNC: 140 MMOL/L (ref 136–145)
TRIGL SERPL-MCNC: 159 MG/DL (ref 35–150)
TSH W FREE THYROID IF ABNORMAL: 1.64 UIU/ML (ref 0.36–3.74)
VLDLC SERPL CALC-MCNC: 31.8 MG/DL (ref 6–23)
WBC # BLD AUTO: 6.1 K/UL (ref 4.3–11.1)

## 2022-07-29 PROCEDURE — G0439 PPPS, SUBSEQ VISIT: HCPCS | Performed by: FAMILY MEDICINE

## 2022-07-29 PROCEDURE — 1123F ACP DISCUSS/DSCN MKR DOCD: CPT | Performed by: FAMILY MEDICINE

## 2022-07-29 RX ORDER — INDAPAMIDE 1.25 MG/1
1.25 TABLET, FILM COATED ORAL DAILY
Qty: 90 TABLET | Refills: 1 | Status: SHIPPED | OUTPATIENT
Start: 2022-07-29

## 2022-07-29 RX ORDER — LEVOTHYROXINE SODIUM 0.07 MG/1
75 TABLET ORAL DAILY
Qty: 90 TABLET | Refills: 1 | Status: SHIPPED | OUTPATIENT
Start: 2022-07-29

## 2022-07-29 RX ORDER — RALOXIFENE HYDROCHLORIDE 60 MG/1
60 TABLET, FILM COATED ORAL DAILY
Qty: 90 TABLET | Refills: 1 | Status: SHIPPED | OUTPATIENT
Start: 2022-07-29

## 2022-07-29 RX ORDER — DIAZEPAM 5 MG/1
5 TABLET ORAL NIGHTLY PRN
Qty: 30 TABLET | Refills: 0 | Status: SHIPPED | OUTPATIENT
Start: 2022-07-29 | End: 2022-08-28

## 2022-07-29 RX ORDER — ROSUVASTATIN CALCIUM 5 MG/1
5 TABLET, COATED ORAL DAILY
Qty: 90 TABLET | Refills: 1 | Status: SHIPPED | OUTPATIENT
Start: 2022-07-29

## 2022-07-29 RX ORDER — BENAZEPRIL HYDROCHLORIDE 40 MG/1
40 TABLET, FILM COATED ORAL DAILY
Qty: 90 TABLET | Refills: 1 | Status: SHIPPED | OUTPATIENT
Start: 2022-07-29

## 2022-07-29 RX ORDER — CARVEDILOL 3.12 MG/1
3.12 TABLET ORAL 2 TIMES DAILY WITH MEALS
Qty: 180 TABLET | Refills: 1 | Status: SHIPPED | OUTPATIENT
Start: 2022-07-29

## 2022-07-29 RX ORDER — INDOMETHACIN 50 MG/1
50 CAPSULE ORAL 3 TIMES DAILY PRN
Qty: 90 CAPSULE | Refills: 0 | Status: CANCELLED | OUTPATIENT
Start: 2022-07-29

## 2022-07-29 ASSESSMENT — PATIENT HEALTH QUESTIONNAIRE - PHQ9
SUM OF ALL RESPONSES TO PHQ QUESTIONS 1-9: 0
1. LITTLE INTEREST OR PLEASURE IN DOING THINGS: 0
SUM OF ALL RESPONSES TO PHQ QUESTIONS 1-9: 0
2. FEELING DOWN, DEPRESSED OR HOPELESS: 0
SUM OF ALL RESPONSES TO PHQ9 QUESTIONS 1 & 2: 0
DEPRESSION UNABLE TO ASSESS: PT REFUSES

## 2022-07-29 ASSESSMENT — LIFESTYLE VARIABLES
HOW OFTEN DO YOU HAVE A DRINK CONTAINING ALCOHOL: NEVER
HOW MANY STANDARD DRINKS CONTAINING ALCOHOL DO YOU HAVE ON A TYPICAL DAY: PATIENT DOES NOT DRINK

## 2022-07-29 NOTE — PROGRESS NOTES
Medicare Annual Wellness Visit    Akira Burgos is here for Hypertension and Medicare AWV    Assessment & Plan   Medicare annual wellness visit, subsequent  HTN (hypertension), benign  -     benazepril (LOTENSIN) 40 MG tablet; Take 1 tablet by mouth in the morning., Disp-90 tablet, R-1Normal  -     carvedilol (COREG) 3.125 MG tablet; Take 1 tablet by mouth in the morning and 1 tablet in the evening. Take with meals. , Disp-180 tablet, R-1Normal  -     indapamide (LOZOL) 1.25 MG tablet; Take 1 tablet by mouth in the morning., Disp-90 tablet, R-1Normal  -     CBC with Auto Differential; Future  -     Basic Metabolic Panel; Future  -     Hepatic Function Panel; Future  Hypothyroidism, adult  -     levothyroxine (SYNTHROID) 75 MCG tablet; Take 1 tablet by mouth in the morning., Disp-90 tablet, R-1Normal  -     TSH with Reflex; Future  Mixed hyperlipidemia  -     rosuvastatin (CRESTOR) 5 MG tablet; Take 1 tablet by mouth in the morning., Disp-90 tablet, R-1Normal  -     Lipid Panel; Future  Anxiety  -     diazePAM (VALIUM) 5 MG tablet; Take 1 tablet by mouth nightly as needed for Anxiety for up to 30 days. , Disp-30 tablet, R-0Normal  Osteopenia of multiple sites  -     raloxifene (EVISTA) 60 MG tablet; Take 1 tablet by mouth in the morning., Disp-90 tablet, R-1Normal  Chronic idiopathic gout involving toe without tophus, unspecified laterality      Chronic medical conditions stable. Labs and refills as ordered. Takes diazepam on very prn basis for severe anxiety. Advised her how to take medication properly. Discussed the potential side effects--including addiction--and benefits of the medication. She is to call with any problems or concerns. I have reviewed the patients controlled substance prescription history, as maintained in the [address] prescription monitoring program, so that the prescription(s) for a controlled substance can be given.   Recommendations for Preventive Services Due: see orders and patient instructions/AVS.  Recommended screening schedule for the next 5-10 years is provided to the patient in written form: see Patient Instructions/AVS.     Return for Medicare Annual Wellness Visit in 1 year. Subjective   Patient's complete Health Risk Assessment and screening values have been reviewed and are found in Flowsheets. The following problems were reviewed today and where indicated follow up appointments were made and/or referrals ordered. Positive Risk Factor Screenings with Interventions:      Depression:  Depression Unable to Assess: Pt refuses  PHQ-2 Score: 0  PHQ-9 Total Score: 0    Severity:1-4 = minimal depression, 5-9 = mild depression, 10-14 = moderate depression, 15-19 = moderately severe depression, 20-27 = severe depression          General Health and ACP:  General  In general, how would you say your health is?: Good  In the past 7 days, have you experienced any of the following: New or Increased Pain, New or Increased Fatigue, Loneliness, Social Isolation, Stress or Anger?: No  Do you get the social and emotional support that you need?: Yes  Do you have a Living Will?: Yes    Advance Directives       Power of  Living Will ACP-Advance Directive ACP-Power of     Not on File Not on File Not on File Not on File          General Health Risk Interventions:  Continue with healthy lifestyle interventions. Health Habits/Nutrition:  Physical Activity: Inactive    Days of Exercise per Week: 0 days    Minutes of Exercise per Session: 0 min     Have you lost any weight without trying in the past 3 months?: No  Body mass index: 24.79  Have you seen the dentist within the past year?: Yes  Health Habits/Nutrition Interventions:  Encourage healthy eating, exercise.          Safety:  Do you have working smoke detectors?: Yes  Do you have any tripping hazards - loose or unsecured carpets or rugs?: No  Do you have any tripping hazards - clutter in doorways, halls, or stairs?: No  Do you have either shower bars, grab bars, non-slip mats or non-slip surfaces in your shower or bathtub?: (!) No  Do all of your stairways have a railing or banister?: Yes  Do you always fasten your seatbelt when you are in a car?: Yes  Safety Interventions:  Fall precautions. Objective   Vitals:    07/29/22 0939   BP: 128/78   Site: Left Upper Arm   Position: Sitting   Pulse: 84   Resp: 16   SpO2: 98%   Weight: 149 lb (67.6 kg)   Height: 5' 5\" (1.651 m)      Body mass index is 24.79 kg/m². Physical Exam  Vitals reviewed. HENT:      Head: Normocephalic and atraumatic. Mouth/Throat:      Mouth: Mucous membranes are moist.      Pharynx: Oropharynx is clear. Eyes:      Extraocular Movements: Extraocular movements intact. Conjunctiva/sclera: Conjunctivae normal.      Pupils: Pupils are equal, round, and reactive to light. Cardiovascular:      Rate and Rhythm: Normal rate and regular rhythm. Pulmonary:      Effort: Pulmonary effort is normal.      Breath sounds: Normal breath sounds. Abdominal:      General: Abdomen is flat. Bowel sounds are normal.      Palpations: Abdomen is soft. Musculoskeletal:         General: Normal range of motion. Cervical back: Normal range of motion and neck supple. Skin:     General: Skin is warm and dry. Neurological:      General: No focal deficit present. Mental Status: She is alert. Allergies   Allergen Reactions    Ciprofloxacin Other (See Comments)     Complains of pain in abd    Sulfamethoxazole-Trimethoprim Other (See Comments)     High potassium    Tramadol Nausea Only    Atorvastatin Palpitations     Prior to Visit Medications    Medication Sig Taking? Authorizing Provider   benazepril (LOTENSIN) 40 MG tablet Take 1 tablet by mouth in the morning. Yes Kelechi Jackson Brothers, DO   carvedilol (COREG) 3.125 MG tablet Take 1 tablet by mouth in the morning and 1 tablet in the evening. Take with meals.  Yes Kayce Carpenter, DO diazePAM (VALIUM) 5 MG tablet Take 1 tablet by mouth nightly as needed for Anxiety for up to 30 days. Yes Jay Jay Flynn Brothers, DO   indapamide (LOZOL) 1.25 MG tablet Take 1 tablet by mouth in the morning. Yes Jay Jay Flynn Brothers, DO   levothyroxine (SYNTHROID) 75 MCG tablet Take 1 tablet by mouth in the morning. Yes Jay Jay Flynn Brothers, DO   raloxifene (EVISTA) 60 MG tablet Take 1 tablet by mouth in the morning. Yes Jay Jay Flynn Brothers, DO   rosuvastatin (CRESTOR) 5 MG tablet Take 1 tablet by mouth in the morning.  Yes Jay Jay Flynn Brothers, DO   BIOTIN PO Take by mouth Yes Ar Automatic Reconciliation   vitamin D (CHOLECALCIFEROL) 25 MCG (1000 UT) TABS tablet Take by mouth daily Yes Ar Automatic Reconciliation   fluticasone (FLONASE) 50 MCG/ACT nasal spray 2 sprays by Nasal route daily Yes Ar Automatic Reconciliation   hydrocortisone (ANUSOL-HC) 25 MG suppository Place 25 mg rectally every 12 hours Yes Ar Automatic Reconciliation   indomethacin (INDOCIN) 50 MG capsule Take 50 mg by mouth 3 times daily as needed Yes Ar Automatic Reconciliation       CareTeam (Including outside providers/suppliers regularly involved in providing care):   Patient Care Team:  Alessandro Clark DO as PCP - General  Alessandro Clark DO as PCP - [facility] Empaneled Provider     Reviewed and updated this visit:  Tobacco  Allergies  Meds  Problems  Med Hx  Surg Hx  Soc Hx  Fam Hx

## 2022-11-02 ENCOUNTER — PATIENT MESSAGE (OUTPATIENT)
Dept: INTERNAL MEDICINE CLINIC | Facility: CLINIC | Age: 82
End: 2022-11-02

## 2022-11-02 DIAGNOSIS — H66.91 OTITIS OF RIGHT EAR: Primary | ICD-10-CM

## 2022-11-02 RX ORDER — AMOXICILLIN 500 MG/1
500 CAPSULE ORAL 2 TIMES DAILY
Qty: 14 CAPSULE | Refills: 0 | Status: SHIPPED | OUTPATIENT
Start: 2022-11-02 | End: 2022-11-09 | Stop reason: ALTCHOICE

## 2022-11-09 ENCOUNTER — OFFICE VISIT (OUTPATIENT)
Dept: INTERNAL MEDICINE CLINIC | Facility: CLINIC | Age: 82
End: 2022-11-09
Payer: MEDICARE

## 2022-11-09 VITALS
RESPIRATION RATE: 16 BRPM | DIASTOLIC BLOOD PRESSURE: 70 MMHG | WEIGHT: 149 LBS | OXYGEN SATURATION: 98 % | HEIGHT: 65 IN | HEART RATE: 88 BPM | BODY MASS INDEX: 24.83 KG/M2 | SYSTOLIC BLOOD PRESSURE: 130 MMHG

## 2022-11-09 DIAGNOSIS — J01.00 ACUTE NON-RECURRENT MAXILLARY SINUSITIS: Primary | ICD-10-CM

## 2022-11-09 DIAGNOSIS — H61.23 BILATERAL IMPACTED CERUMEN: ICD-10-CM

## 2022-11-09 PROCEDURE — G8420 CALC BMI NORM PARAMETERS: HCPCS | Performed by: FAMILY MEDICINE

## 2022-11-09 PROCEDURE — 69210 REMOVE IMPACTED EAR WAX UNI: CPT | Performed by: FAMILY MEDICINE

## 2022-11-09 PROCEDURE — 3074F SYST BP LT 130 MM HG: CPT | Performed by: FAMILY MEDICINE

## 2022-11-09 PROCEDURE — G8399 PT W/DXA RESULTS DOCUMENT: HCPCS | Performed by: FAMILY MEDICINE

## 2022-11-09 PROCEDURE — 1123F ACP DISCUSS/DSCN MKR DOCD: CPT | Performed by: FAMILY MEDICINE

## 2022-11-09 PROCEDURE — 99213 OFFICE O/P EST LOW 20 MIN: CPT | Performed by: FAMILY MEDICINE

## 2022-11-09 PROCEDURE — 3078F DIAST BP <80 MM HG: CPT | Performed by: FAMILY MEDICINE

## 2022-11-09 PROCEDURE — 1090F PRES/ABSN URINE INCON ASSESS: CPT | Performed by: FAMILY MEDICINE

## 2022-11-09 PROCEDURE — G8484 FLU IMMUNIZE NO ADMIN: HCPCS | Performed by: FAMILY MEDICINE

## 2022-11-09 PROCEDURE — G8427 DOCREV CUR MEDS BY ELIG CLIN: HCPCS | Performed by: FAMILY MEDICINE

## 2022-11-09 PROCEDURE — 1036F TOBACCO NON-USER: CPT | Performed by: FAMILY MEDICINE

## 2022-11-09 RX ORDER — AMOXICILLIN AND CLAVULANATE POTASSIUM 875; 125 MG/1; MG/1
1 TABLET, FILM COATED ORAL 2 TIMES DAILY
Qty: 20 TABLET | Refills: 0 | Status: SHIPPED | OUTPATIENT
Start: 2022-11-09 | End: 2022-11-19

## 2022-11-09 ASSESSMENT — PATIENT HEALTH QUESTIONNAIRE - PHQ9
SUM OF ALL RESPONSES TO PHQ9 QUESTIONS 1 & 2: 0
SUM OF ALL RESPONSES TO PHQ QUESTIONS 1-9: 0
2. FEELING DOWN, DEPRESSED OR HOPELESS: 0
1. LITTLE INTEREST OR PLEASURE IN DOING THINGS: 0

## 2022-11-09 NOTE — PROGRESS NOTES
Lary Rich DO  [employer]iplomate of the [employer] of [address] [place] [address] Internal Medicine      Amanda Vasquez (: 1940) is a 80 y.o. female, here for evaluation of the following chief complaint(s):  Cough, Nasal Congestion, and Otalgia (Right sided)       ASSESSMENT/PLAN:  1. Acute non-recurrent maxillary sinusitis  -Symptomatic therapy suggested: gargle salt water for sore throat, use nasal saline mist at bedside for congestion. Apply facial warm packs for sinus pain. May use acetaminophen, ibuprofen, antihistamine of choice. Increase fluids and rest.  -Instructed on the proper use of antibiotics. Also stressed the importance of taking the full course to help prevent the risk of resistance. Advised taking pro-biotics (yogurt, align or equivalent) while on antibiotics to reduce the risk of C. Diff infection. -     amoxicillin-clavulanate (AUGMENTIN) 875-125 MG per tablet; Take 1 tablet by mouth 2 times daily for 10 days, Disp-20 tablet, R-0Normal  2. Bilateral impacted cerumen  -Cerumen impaction was observed. An ear irrigation was performed on both ear(s). Irrigation proved to be beneficial in clearing a moderate amount of cerumen. Cerumen removed with lavage/currette. Patient tolerated well. Ear canal is now visible and clear after the procedure. The procedure lasted 10 minutes. Instructions for home care to prevent wax buildup are given. -     60024 - SD REMOVE IMPACTED EAR WAX             SUBJECTIVE/OBJECTIVE:  HPI:  -Patient's  has been in the hospital.  She has been having persistent worsening sinus pressure with drainage as well as right ear pain. We did go ahead and call her out some amoxicillin, but she only took a few doses. Denies any fever. No shortness of breath or purulent productive cough. No CP. ROS negative except as noted above today.     Social History     Tobacco Use    Smoking status: Former     Packs/day: 1.00     Types: Cigarettes     Quit date: 1983     Years since quittin.8    Smokeless tobacco: Never   Substance Use Topics    Alcohol use: Yes     Alcohol/week: 0.0 standard drinks    Drug use: No     Vitals:    22 1037   BP: 130/70   Site: Left Upper Arm   Position: Sitting   Pulse: 88   Resp: 16   SpO2: 98%   Weight: 149 lb (67.6 kg)   Height: 5' 5\" (1.651 m)      Body mass index is 24.79 kg/m². Physical Exam  Vitals reviewed. HENT:      Right Ear: There is impacted cerumen. Left Ear: There is impacted cerumen. Mouth/Throat:      Mouth: Mucous membranes are moist.      Pharynx: Posterior oropharyngeal erythema present. No oropharyngeal exudate. Cardiovascular:      Rate and Rhythm: Normal rate and regular rhythm. Pulmonary:      Effort: Pulmonary effort is normal.      Breath sounds: Normal breath sounds. Skin:     General: Skin is warm and dry. Neurological:      Mental Status: She is alert. An electronic signature was used to authenticate this note.   Renee Schuler,

## 2022-11-21 ENCOUNTER — TELEPHONE (OUTPATIENT)
Dept: INTERNAL MEDICINE CLINIC | Facility: CLINIC | Age: 82
End: 2022-11-21

## 2022-11-21 NOTE — TELEPHONE ENCOUNTER
Patient was seen approx 2 weeks ago for URI symptoms. She still has complaints of same symptoms: runny nose, sneezing, ears popping, and cough. Please advise if there is anything else patient needs either rx or OTC meds.

## 2022-12-15 ENCOUNTER — OFFICE VISIT (OUTPATIENT)
Dept: ENT CLINIC | Age: 82
End: 2022-12-15
Payer: MEDICARE

## 2022-12-15 VITALS — HEIGHT: 65 IN | OXYGEN SATURATION: 98 % | HEART RATE: 81 BPM | BODY MASS INDEX: 24.66 KG/M2 | WEIGHT: 148 LBS

## 2022-12-15 DIAGNOSIS — H69.81 ETD (EUSTACHIAN TUBE DYSFUNCTION), RIGHT: Primary | ICD-10-CM

## 2022-12-15 DIAGNOSIS — H73.891 TYMPANIC MEMBRANE RETRACTION, RIGHT: ICD-10-CM

## 2022-12-15 DIAGNOSIS — H61.23 BILATERAL IMPACTED CERUMEN: ICD-10-CM

## 2022-12-15 DIAGNOSIS — R94.120 TYPE C TYMPANOGRAM: ICD-10-CM

## 2022-12-15 PROCEDURE — G8399 PT W/DXA RESULTS DOCUMENT: HCPCS | Performed by: STUDENT IN AN ORGANIZED HEALTH CARE EDUCATION/TRAINING PROGRAM

## 2022-12-15 PROCEDURE — 1123F ACP DISCUSS/DSCN MKR DOCD: CPT | Performed by: STUDENT IN AN ORGANIZED HEALTH CARE EDUCATION/TRAINING PROGRAM

## 2022-12-15 PROCEDURE — G8484 FLU IMMUNIZE NO ADMIN: HCPCS | Performed by: STUDENT IN AN ORGANIZED HEALTH CARE EDUCATION/TRAINING PROGRAM

## 2022-12-15 PROCEDURE — G8420 CALC BMI NORM PARAMETERS: HCPCS | Performed by: STUDENT IN AN ORGANIZED HEALTH CARE EDUCATION/TRAINING PROGRAM

## 2022-12-15 PROCEDURE — 69210 REMOVE IMPACTED EAR WAX UNI: CPT | Performed by: STUDENT IN AN ORGANIZED HEALTH CARE EDUCATION/TRAINING PROGRAM

## 2022-12-15 PROCEDURE — 1036F TOBACCO NON-USER: CPT | Performed by: STUDENT IN AN ORGANIZED HEALTH CARE EDUCATION/TRAINING PROGRAM

## 2022-12-15 PROCEDURE — 1090F PRES/ABSN URINE INCON ASSESS: CPT | Performed by: STUDENT IN AN ORGANIZED HEALTH CARE EDUCATION/TRAINING PROGRAM

## 2022-12-15 PROCEDURE — G8427 DOCREV CUR MEDS BY ELIG CLIN: HCPCS | Performed by: STUDENT IN AN ORGANIZED HEALTH CARE EDUCATION/TRAINING PROGRAM

## 2022-12-15 PROCEDURE — 99204 OFFICE O/P NEW MOD 45 MIN: CPT | Performed by: STUDENT IN AN ORGANIZED HEALTH CARE EDUCATION/TRAINING PROGRAM

## 2022-12-15 RX ORDER — PREDNISONE 20 MG/1
TABLET ORAL
Qty: 16 TABLET | Refills: 0 | Status: SHIPPED | OUTPATIENT
Start: 2022-12-15

## 2022-12-15 ASSESSMENT — ENCOUNTER SYMPTOMS
COLOR CHANGE: 0
APNEA: 0
EYE DISCHARGE: 0
ABDOMINAL DISTENTION: 0

## 2022-12-15 NOTE — PROGRESS NOTES
HPI:  Elida Trujillo is a 80 y.o. female seen Established   Chief Complaint   Patient presents with    Ear Problem     Patient presents today with c/o R sided fullness / water in her ear sensation . Patient states that antihistamines have not helped symptoms. 55-year-old female seen as a follow-up evaluation last evaluated in 2020 for complaints of hearing loss. She is continue to have baseline hearing loss without the need for hearing aids. Over the last 6 weeks she has had a noticeable increase right-sided ear fullness and a water sensation. His symptoms were brought on by typical URI type event with increased nasal congestion and rhinorrhea having ear ache on the right side. She has been trialed on oral antihistamines and Flonase pretty consistently without improvement. She was told that there was some cerumen to her ear but not a complete impaction. She does have a history of significant ear cerumen impactions in the past.  She denies any otalgia otorrhea dizziness vertigo outside of just having muffled hearing sensation to the right with a water fluid sensation when she moves her head. Past Medical History, Past Surgical History, Family history, Social History, and Medications were all reviewed with the patient today and updated as necessary.      Allergies   Allergen Reactions    Ciprofloxacin Other (See Comments)     Complains of pain in abd    Sulfamethoxazole-Trimethoprim Other (See Comments)     High potassium    Tramadol Nausea Only    Atorvastatin Palpitations       Patient Active Problem List   Diagnosis    Palpitations    History of sepsis    White coat syndrome with hypertension    Cognitive deficits    History of breast cancer    S/P laparoscopic cholecystectomy    Osteopenia of multiple sites    Hypothyroidism, adult    Mixed hyperlipidemia    Impaired fasting blood sugar    HTN (hypertension), benign    Chronic idiopathic gout involving toe without tophus       Current Outpatient Medications   Medication Sig    predniSONE (DELTASONE) 20 MG tablet 60 mg (3 tabs) PO once daily for 3 days; 40 mg (2 tabs) for 2 days; 20 mg (1 tab) for 2 days; 10 mg (1/2 tab) for 2 days    benazepril (LOTENSIN) 40 MG tablet Take 1 tablet by mouth in the morning. carvedilol (COREG) 3.125 MG tablet Take 1 tablet by mouth in the morning and 1 tablet in the evening. Take with meals. indapamide (LOZOL) 1.25 MG tablet Take 1 tablet by mouth in the morning. levothyroxine (SYNTHROID) 75 MCG tablet Take 1 tablet by mouth in the morning. raloxifene (EVISTA) 60 MG tablet Take 1 tablet by mouth in the morning. rosuvastatin (CRESTOR) 5 MG tablet Take 1 tablet by mouth in the morning. BIOTIN PO Take by mouth    vitamin D (CHOLECALCIFEROL) 25 MCG (1000 UT) TABS tablet Take by mouth daily    fluticasone (FLONASE) 50 MCG/ACT nasal spray 2 sprays by Nasal route daily    hydrocortisone (ANUSOL-HC) 25 MG suppository Place 25 mg rectally every 12 hours    indomethacin (INDOCIN) 50 MG capsule Take 50 mg by mouth 3 times daily as needed     No current facility-administered medications for this visit.        Past Medical History:   Diagnosis Date    Breast cancer ([facility] [address] 20 years ago    Left breast- treated with Chemo and radiation    Chronic idiopathic gout involving toe without tophus 7/29/2022    Cognitive deficits 3/26/2018    Ear problems     Former cigarette smoker     GERD (gastroesophageal reflux disease)     Gout     hx of - no recent episodes    Hearing reduced     History of breast cancer 10/31/2018    Hypertension     Hypothyroidism, adult     Mixed hyperlipidemia     Osteopenia of multiple sites 4/30/2019    Thyroid disease        Past Surgical History:   Procedure Laterality Date    BREAST LUMPECTOMY Left     CHOLECYSTECTOMY  2017    with stent and stent removal    COLONOSCOPY      WISDOM TOOTH EXTRACTION         Social History     Tobacco Use    Smoking status: Former     Packs/day: 1.00 Types: Cigarettes     Quit date: 1983     Years since quittin.9    Smokeless tobacco: Never   Substance Use Topics    Alcohol use: Yes     Alcohol/week: 0.0 standard drinks       Family History   Problem Relation Age of Onset    Cancer Mother         Breast CA    Heart Attack Father         M.I    Diabetes Father     Heart Disease Mother         ROS:    Review of Systems   Constitutional:  Negative for activity change. HENT:  Negative for congestion. Eyes:  Negative for discharge. Respiratory:  Negative for apnea. Cardiovascular:  Negative for chest pain. Gastrointestinal:  Negative for abdominal distention. Endocrine: Negative for cold intolerance. Genitourinary:  Negative for difficulty urinating. Musculoskeletal:  Negative for arthralgias. Skin:  Negative for color change. Allergic/Immunologic: Negative for environmental allergies. Neurological:  Negative for dizziness. Hematological:  Negative for adenopathy. Psychiatric/Behavioral:  Negative for agitation. PHYSICAL EXAM:    Pulse 81   Ht 5' 5\" (1.651 m)   Wt 148 lb (67.1 kg)   SpO2 98%   BMI 24.63 kg/m²     Physical Exam  Vitals and nursing note reviewed. Constitutional:       Appearance: Normal appearance. HENT:      Head: Normocephalic and atraumatic. Right Ear: Ear canal and external ear normal. No middle ear effusion. There is no impacted cerumen. Tympanic membrane is retracted. Tympanic membrane is not scarred, perforated or erythematous. Left Ear: Tympanic membrane, ear canal and external ear normal.  No middle ear effusion. There is no impacted cerumen. Tympanic membrane is not scarred, perforated, erythematous or retracted. Ears:      Comments: Right-sided TM retraction mild dullness without obvious middle ear effusion. Slight TM mobility on pneumatic otoscopy. Type C tympanogram.    Left-sided TM without retraction or middle ear effusion.      Nose: Nose normal. No congestion or rhinorrhea. Mouth/Throat:      Mouth: Mucous membranes are moist.      Pharynx: Oropharynx is clear. No oropharyngeal exudate or posterior oropharyngeal erythema. Eyes:      General: No scleral icterus. Pulmonary:      Effort: Pulmonary effort is normal.   Musculoskeletal:      Cervical back: Normal range of motion and neck supple. No rigidity. Lymphadenopathy:      Cervical: No cervical adenopathy. Skin:     General: Skin is warm and dry. Neurological:      Mental Status: She is alert and oriented to person, place, and time. Psychiatric:         Mood and Affect: Mood normal.         Behavior: Behavior normal.        PROCEDURE: Cerumen removal under binocular microscopy  INDICATIONS: Cerumen impaction  DESCRIPTION: After verbal consent was obtained and a timeout was performed, the otologic microscope was used to visualize both ears. There were normal appearing auricles bilaterally. There was cerumen impaction bilaterally. Ear cleaning performed under the scope w/ a right angle hook,alligator forceps, and potter suctions. After cleaning, the ear canal skin was healthy and both TMs were intact w/ no perforations. Both middle ear spaces were clear w/ no effusions. The patient tolerated the procedure well and there were no complications. ASSESSMENT and PLAN        ICD-10-CM    1. ETD (Eustachian tube dysfunction), right  H69.81       2. Tympanic membrane retraction, right  H73.891       3. Type C tympanogram  R94.120       4. Bilateral impacted cerumen  H61.23 REMOVE IMPACTED EAR WAX          Following cerumen removal she had right-sided TM slightly retracted with dullness without obvious middle ear effusion. Left-sided TM intact no perforation retraction or middle ear effusion.   Due to the above findings tympanostomy testing was performed which showed a right-sided type C tympanogram and a left-sided type a tympanogram.  We discussed pathophysiology of eustachian tube dysfunction and its relationship to her right-sided ear symptoms over the last several weeks. She has had slight improvement in the recent week or so and we discussed that she most likely had a recent right-sided otitis media with effusion which is slowly resolving with residual type C negative pressure on that side. For this I recommended continued Flonase daily oral antihistamine daily and a prednisone taper to help with decongesting and opening her eustachian tube. I will see her back as needed if symptoms persist or worsen and we can consider a hearing test and if recurrence of middle ear effusion tympanostomy tube.     Celeste Aparicio,   12/15/2022

## 2023-01-23 ENCOUNTER — OFFICE VISIT (OUTPATIENT)
Dept: ENT CLINIC | Age: 83
End: 2023-01-23
Payer: MEDICARE

## 2023-01-23 VITALS — HEIGHT: 65 IN | RESPIRATION RATE: 16 BRPM | BODY MASS INDEX: 24.99 KG/M2 | WEIGHT: 150 LBS

## 2023-01-23 DIAGNOSIS — H69.81 ETD (EUSTACHIAN TUBE DYSFUNCTION), RIGHT: Primary | ICD-10-CM

## 2023-01-23 DIAGNOSIS — J34.89 NASAL OBSTRUCTION: ICD-10-CM

## 2023-01-23 DIAGNOSIS — J34.2 DEVIATED NASAL SEPTUM: ICD-10-CM

## 2023-01-23 PROCEDURE — 1036F TOBACCO NON-USER: CPT | Performed by: STUDENT IN AN ORGANIZED HEALTH CARE EDUCATION/TRAINING PROGRAM

## 2023-01-23 PROCEDURE — 1123F ACP DISCUSS/DSCN MKR DOCD: CPT | Performed by: STUDENT IN AN ORGANIZED HEALTH CARE EDUCATION/TRAINING PROGRAM

## 2023-01-23 PROCEDURE — G8399 PT W/DXA RESULTS DOCUMENT: HCPCS | Performed by: STUDENT IN AN ORGANIZED HEALTH CARE EDUCATION/TRAINING PROGRAM

## 2023-01-23 PROCEDURE — G8427 DOCREV CUR MEDS BY ELIG CLIN: HCPCS | Performed by: STUDENT IN AN ORGANIZED HEALTH CARE EDUCATION/TRAINING PROGRAM

## 2023-01-23 PROCEDURE — G8484 FLU IMMUNIZE NO ADMIN: HCPCS | Performed by: STUDENT IN AN ORGANIZED HEALTH CARE EDUCATION/TRAINING PROGRAM

## 2023-01-23 PROCEDURE — 99213 OFFICE O/P EST LOW 20 MIN: CPT | Performed by: STUDENT IN AN ORGANIZED HEALTH CARE EDUCATION/TRAINING PROGRAM

## 2023-01-23 PROCEDURE — 1090F PRES/ABSN URINE INCON ASSESS: CPT | Performed by: STUDENT IN AN ORGANIZED HEALTH CARE EDUCATION/TRAINING PROGRAM

## 2023-01-23 PROCEDURE — 92504 EAR MICROSCOPY EXAMINATION: CPT | Performed by: STUDENT IN AN ORGANIZED HEALTH CARE EDUCATION/TRAINING PROGRAM

## 2023-01-23 PROCEDURE — G8420 CALC BMI NORM PARAMETERS: HCPCS | Performed by: STUDENT IN AN ORGANIZED HEALTH CARE EDUCATION/TRAINING PROGRAM

## 2023-01-23 ASSESSMENT — ENCOUNTER SYMPTOMS
APNEA: 0
COLOR CHANGE: 0
EYE DISCHARGE: 0
ABDOMINAL DISTENTION: 0

## 2023-01-23 NOTE — PROGRESS NOTES
HPI:  Amanda Morris is a 82 y.o. female seen Established   Chief Complaint   Patient presents with    Ear Problem     Patient presents today with c/o R sided fullness . She states that she has recently felt relief , she is sure it has resolved but would like to just have it checked today .          82-year-old female seen as a follow-up evaluation having a history of right-sided eustachian tube dysfunction and a exam 1 month ago showing right-sided TM retraction and a type C tympanogram.  We discussed eustachian tube dysfunction and likely that she was having a resolving middle ear effusion after having had a URI event in early December.  She is continue to use medical therapy including intranasal corticosteroid spray such as Flonase daily, oral antihistamines and eventually when she was prescribed prednisone taper she had relief of symptoms temporarily for a few days and then rebounding mildly for which she trialed Sudafed with eventual relief coming after having an urgent care evaluation with a repeated dosage of prednisone for 5 days.  She now feels as though her ear is mostly back to normal.    Past Medical History, Past Surgical History, Family history, Social History, and Medications were all reviewed with the patient today and updated as necessary.     Allergies   Allergen Reactions    Ciprofloxacin Other (See Comments)     Complains of pain in abd    Sulfamethoxazole-Trimethoprim Other (See Comments)     High potassium    Tramadol Nausea Only    Atorvastatin Palpitations       Patient Active Problem List   Diagnosis    Palpitations    History of sepsis    White coat syndrome with hypertension    Cognitive deficits    History of breast cancer    S/P laparoscopic cholecystectomy    Osteopenia of multiple sites    Hypothyroidism, adult    Mixed hyperlipidemia    Impaired fasting blood sugar    HTN (hypertension), benign    Chronic idiopathic gout involving toe without tophus       Current Outpatient  Medications   Medication Sig    predniSONE (DELTASONE) 20 MG tablet 60 mg (3 tabs) PO once daily for 3 days; 40 mg (2 tabs) for 2 days; 20 mg (1 tab) for 2 days; 10 mg (1/2 tab) for 2 days    benazepril (LOTENSIN) 40 MG tablet Take 1 tablet by mouth in the morning. carvedilol (COREG) 3.125 MG tablet Take 1 tablet by mouth in the morning and 1 tablet in the evening. Take with meals. indapamide (LOZOL) 1.25 MG tablet Take 1 tablet by mouth in the morning. levothyroxine (SYNTHROID) 75 MCG tablet Take 1 tablet by mouth in the morning. raloxifene (EVISTA) 60 MG tablet Take 1 tablet by mouth in the morning. rosuvastatin (CRESTOR) 5 MG tablet Take 1 tablet by mouth in the morning. BIOTIN PO Take by mouth    vitamin D (CHOLECALCIFEROL) 25 MCG (1000 UT) TABS tablet Take by mouth daily    fluticasone (FLONASE) 50 MCG/ACT nasal spray 2 sprays by Nasal route daily    hydrocortisone (ANUSOL-HC) 25 MG suppository Place 25 mg rectally every 12 hours    indomethacin (INDOCIN) 50 MG capsule Take 50 mg by mouth 3 times daily as needed     No current facility-administered medications for this visit.        Past Medical History:   Diagnosis Date    Breast cancer ([facility] [address] 20 years ago    Left breast- treated with Chemo and radiation    Chronic idiopathic gout involving toe without tophus 7/29/2022    Cognitive deficits 3/26/2018    Ear problems     Former cigarette smoker     GERD (gastroesophageal reflux disease)     Gout     hx of - no recent episodes    Hearing reduced     History of breast cancer 10/31/2018    Hypertension     Hypothyroidism, adult     Mixed hyperlipidemia     Osteopenia of multiple sites 4/30/2019    Thyroid disease        Past Surgical History:   Procedure Laterality Date    BREAST LUMPECTOMY Left     CHOLECYSTECTOMY  2017    with stent and stent removal    COLONOSCOPY      WISDOM TOOTH EXTRACTION         Social History     Tobacco Use    Smoking status: Former     Packs/day: 1.00     Types: Cigarettes     Quit date: 1983     Years since quittin.0    Smokeless tobacco: Never   Substance Use Topics    Alcohol use: Yes     Alcohol/week: 0.0 standard drinks       Family History   Problem Relation Age of Onset    Cancer Mother         Breast CA    Heart Attack Father         M.I    Diabetes Father     Heart Disease Mother         ROS:    Review of Systems   Constitutional:  Negative for activity change. HENT:  Negative for congestion. Eyes:  Negative for discharge. Respiratory:  Negative for apnea. Cardiovascular:  Negative for chest pain. Gastrointestinal:  Negative for abdominal distention. Endocrine: Negative for cold intolerance. Genitourinary:  Negative for difficulty urinating. Musculoskeletal:  Negative for arthralgias. Skin:  Negative for color change. Allergic/Immunologic: Negative for environmental allergies. Neurological:  Negative for dizziness. Hematological:  Negative for adenopathy. Psychiatric/Behavioral:  Negative for agitation. PHYSICAL EXAM:    Resp 16   Ht 5' 5\" (1.651 m)   Wt 150 lb (68 kg)   BMI 24.96 kg/m²     Physical Exam  Vitals and nursing note reviewed. Constitutional:       Appearance: Normal appearance. HENT:      Head: Normocephalic and atraumatic. Right Ear: Tympanic membrane, ear canal and external ear normal. No middle ear effusion. There is no impacted cerumen. Tympanic membrane is not scarred, perforated, erythematous or retracted. Left Ear: Tympanic membrane, ear canal and external ear normal.  No middle ear effusion. There is no impacted cerumen. Tympanic membrane is not scarred, perforated, erythematous or retracted. Ears:      Comments: Binocular microscopy revealed:    Bilateral EACs patent without edema erythema or lesions  Bilateral TMs intact with no perforations retractions or middle ear effusions       Nose: Septal deviation present. No congestion or rhinorrhea.       Comments: Anterior rhinoscopy with left-sided DNS. Right-sided middle meatus patent without obvious lesions. Mouth/Throat:      Mouth: Mucous membranes are moist.      Pharynx: Oropharynx is clear. No oropharyngeal exudate or posterior oropharyngeal erythema. Eyes:      General: No scleral icterus. Pulmonary:      Effort: Pulmonary effort is normal.   Musculoskeletal:      Cervical back: Normal range of motion and neck supple. No rigidity. Lymphadenopathy:      Cervical: No cervical adenopathy. Skin:     General: Skin is warm and dry. Neurological:      Mental Status: She is alert and oriented to person, place, and time. Psychiatric:         Mood and Affect: Mood normal.         Behavior: Behavior normal.              ASSESSMENT and PLAN        ICD-10-CM    1. ETD (Eustachian tube dysfunction), right  H69.81 EAR MICROSCOPY EXAMINATION      2. Deviated nasal septum  J34.2       3. Nasal obstruction  J34.89           Patient was reassured of a normal ear exam with the bilateral TMs intact no perforations retractions or middle ear effusions. Anterior rhinoscopy exam shows left-sided DNS and a patent right middle meatus without concerning lesions. We have again reviewed the pathophysiology of ETD and its association with her recent symptoms following her URI event in early December. We discussed that if symptoms were to rebound in the future she can reinitiate the use of Flonase and oral antihistamines and Sudafed as needed for 2 or 3 days. If symptoms were to rebound similar to what she had before then she should call for a follow-up evaluation for which we will consider right-sided myringotomy with tympanostomy tube placement as well as nasal endoscopy to further evaluate the nasopharynx.      Willam Foss DO  1/23/2023

## 2023-01-24 DIAGNOSIS — H69.81 ETD (EUSTACHIAN TUBE DYSFUNCTION), RIGHT: Primary | ICD-10-CM

## 2023-01-24 PROCEDURE — 92567 TYMPANOMETRY: CPT | Performed by: STUDENT IN AN ORGANIZED HEALTH CARE EDUCATION/TRAINING PROGRAM

## 2023-01-30 ENCOUNTER — OFFICE VISIT (OUTPATIENT)
Dept: INTERNAL MEDICINE CLINIC | Facility: CLINIC | Age: 83
End: 2023-01-30
Payer: MEDICARE

## 2023-01-30 VITALS
OXYGEN SATURATION: 98 % | HEIGHT: 65 IN | WEIGHT: 149 LBS | BODY MASS INDEX: 24.83 KG/M2 | DIASTOLIC BLOOD PRESSURE: 70 MMHG | RESPIRATION RATE: 16 BRPM | SYSTOLIC BLOOD PRESSURE: 130 MMHG | HEART RATE: 83 BPM

## 2023-01-30 DIAGNOSIS — E78.2 MIXED HYPERLIPIDEMIA: ICD-10-CM

## 2023-01-30 DIAGNOSIS — M1A.0710 CHRONIC IDIOPATHIC GOUT INVOLVING TOE OF RIGHT FOOT WITHOUT TOPHUS: ICD-10-CM

## 2023-01-30 DIAGNOSIS — N18.31 CHRONIC KIDNEY DISEASE, STAGE 3A (HCC): ICD-10-CM

## 2023-01-30 DIAGNOSIS — M85.89 OSTEOPENIA OF MULTIPLE SITES: ICD-10-CM

## 2023-01-30 DIAGNOSIS — E03.9 HYPOTHYROIDISM, ADULT: ICD-10-CM

## 2023-01-30 DIAGNOSIS — I10 HTN (HYPERTENSION), BENIGN: Primary | ICD-10-CM

## 2023-01-30 DIAGNOSIS — H69.81 DYSFUNCTION OF RIGHT EUSTACHIAN TUBE: ICD-10-CM

## 2023-01-30 LAB
ANION GAP SERPL CALC-SCNC: 9 MMOL/L (ref 2–11)
BUN SERPL-MCNC: 26 MG/DL (ref 8–23)
CALCIUM SERPL-MCNC: 10.1 MG/DL (ref 8.3–10.4)
CHLORIDE SERPL-SCNC: 109 MMOL/L (ref 101–110)
CO2 SERPL-SCNC: 25 MMOL/L (ref 21–32)
CREAT SERPL-MCNC: 1.1 MG/DL (ref 0.6–1)
GLUCOSE SERPL-MCNC: 127 MG/DL (ref 65–100)
POTASSIUM SERPL-SCNC: 4.4 MMOL/L (ref 3.5–5.1)
SODIUM SERPL-SCNC: 143 MMOL/L (ref 133–143)

## 2023-01-30 PROCEDURE — 1036F TOBACCO NON-USER: CPT | Performed by: FAMILY MEDICINE

## 2023-01-30 PROCEDURE — 1090F PRES/ABSN URINE INCON ASSESS: CPT | Performed by: FAMILY MEDICINE

## 2023-01-30 PROCEDURE — G8399 PT W/DXA RESULTS DOCUMENT: HCPCS | Performed by: FAMILY MEDICINE

## 2023-01-30 PROCEDURE — 99214 OFFICE O/P EST MOD 30 MIN: CPT | Performed by: FAMILY MEDICINE

## 2023-01-30 PROCEDURE — 3075F SYST BP GE 130 - 139MM HG: CPT | Performed by: FAMILY MEDICINE

## 2023-01-30 PROCEDURE — G8427 DOCREV CUR MEDS BY ELIG CLIN: HCPCS | Performed by: FAMILY MEDICINE

## 2023-01-30 PROCEDURE — G8484 FLU IMMUNIZE NO ADMIN: HCPCS | Performed by: FAMILY MEDICINE

## 2023-01-30 PROCEDURE — 3078F DIAST BP <80 MM HG: CPT | Performed by: FAMILY MEDICINE

## 2023-01-30 PROCEDURE — 1123F ACP DISCUSS/DSCN MKR DOCD: CPT | Performed by: FAMILY MEDICINE

## 2023-01-30 PROCEDURE — G8420 CALC BMI NORM PARAMETERS: HCPCS | Performed by: FAMILY MEDICINE

## 2023-01-30 RX ORDER — ROSUVASTATIN CALCIUM 5 MG/1
5 TABLET, COATED ORAL DAILY
Qty: 90 TABLET | Refills: 1 | Status: SHIPPED | OUTPATIENT
Start: 2023-01-30

## 2023-01-30 RX ORDER — LEVOTHYROXINE SODIUM 0.07 MG/1
75 TABLET ORAL DAILY
Qty: 90 TABLET | Refills: 1 | Status: SHIPPED | OUTPATIENT
Start: 2023-01-30

## 2023-01-30 RX ORDER — RALOXIFENE HYDROCHLORIDE 60 MG/1
60 TABLET, FILM COATED ORAL DAILY
Qty: 90 TABLET | Refills: 1 | Status: SHIPPED | OUTPATIENT
Start: 2023-01-30

## 2023-01-30 RX ORDER — CARVEDILOL 3.12 MG/1
3.12 TABLET ORAL 2 TIMES DAILY WITH MEALS
Qty: 180 TABLET | Refills: 1 | Status: SHIPPED | OUTPATIENT
Start: 2023-01-30

## 2023-01-30 RX ORDER — BENAZEPRIL HYDROCHLORIDE 40 MG/1
40 TABLET, FILM COATED ORAL DAILY
Qty: 90 TABLET | Refills: 1 | Status: SHIPPED | OUTPATIENT
Start: 2023-01-30

## 2023-01-30 RX ORDER — INDOMETHACIN 50 MG/1
50 CAPSULE ORAL 3 TIMES DAILY PRN
Qty: 60 CAPSULE | Refills: 5 | Status: SHIPPED | OUTPATIENT
Start: 2023-01-30

## 2023-01-30 RX ORDER — INDAPAMIDE 1.25 MG/1
1.25 TABLET, FILM COATED ORAL DAILY
Qty: 90 TABLET | Refills: 1 | Status: SHIPPED | OUTPATIENT
Start: 2023-01-30

## 2023-01-30 SDOH — ECONOMIC STABILITY: FOOD INSECURITY: WITHIN THE PAST 12 MONTHS, THE FOOD YOU BOUGHT JUST DIDN'T LAST AND YOU DIDN'T HAVE MONEY TO GET MORE.: NEVER TRUE

## 2023-01-30 SDOH — ECONOMIC STABILITY: FOOD INSECURITY: WITHIN THE PAST 12 MONTHS, YOU WORRIED THAT YOUR FOOD WOULD RUN OUT BEFORE YOU GOT MONEY TO BUY MORE.: NEVER TRUE

## 2023-01-30 ASSESSMENT — PATIENT HEALTH QUESTIONNAIRE - PHQ9
2. FEELING DOWN, DEPRESSED OR HOPELESS: 0
SUM OF ALL RESPONSES TO PHQ QUESTIONS 1-9: 0
SUM OF ALL RESPONSES TO PHQ QUESTIONS 1-9: 0
SUM OF ALL RESPONSES TO PHQ9 QUESTIONS 1 & 2: 0
1. LITTLE INTEREST OR PLEASURE IN DOING THINGS: 0
SUM OF ALL RESPONSES TO PHQ QUESTIONS 1-9: 0
SUM OF ALL RESPONSES TO PHQ QUESTIONS 1-9: 0

## 2023-01-30 ASSESSMENT — SOCIAL DETERMINANTS OF HEALTH (SDOH): HOW HARD IS IT FOR YOU TO PAY FOR THE VERY BASICS LIKE FOOD, HOUSING, MEDICAL CARE, AND HEATING?: NOT HARD AT ALL

## 2023-01-30 NOTE — PROGRESS NOTES
Feliz Closs, DO  [employer] [address] [place] [address] Internal Medicine      Amanda Lopez (: 1940) is a 80 y.o. female, here for evaluation of the following chief complaint(s):  Hypertension       ASSESSMENT/PLAN:  1. HTN (hypertension), benign  -     benazepril (LOTENSIN) 40 MG tablet; Take 1 tablet by mouth daily, Disp-90 tablet, R-1Normal  -     carvedilol (COREG) 3.125 MG tablet; Take 1 tablet by mouth 2 times daily (with meals), Disp-180 tablet, R-1Normal  -     indapamide (LOZOL) 1.25 MG tablet; Take 1 tablet by mouth daily, Disp-90 tablet, R-1Normal  2. Dysfunction of right eustachian tube  3. Hypothyroidism, adult  -     levothyroxine (SYNTHROID) 75 MCG tablet; Take 1 tablet by mouth Daily, Disp-90 tablet, R-1Normal  4. Chronic idiopathic gout involving toe of right foot without tophus  -     indomethacin (INDOCIN) 50 MG capsule; Take 1 capsule by mouth 3 times daily as needed for Pain, Disp-60 capsule, R-5Normal  5. Mixed hyperlipidemia  -     rosuvastatin (CRESTOR) 5 MG tablet; Take 1 tablet by mouth daily, Disp-90 tablet, R-1Normal  6. Osteopenia of multiple sites  -     raloxifene (EVISTA) 60 MG tablet; Take 1 tablet by mouth daily, Disp-90 tablet, R-1Normal  7. Chronic kidney disease, stage 3a ([address]  -     Basic Metabolic Panel; Future     Tolerating medication well for HTN. Achieving desired therapeutic response with   Key Anti-Hypertensive Meds            benazepril (LOTENSIN) 40 MG tablet (Taking)    Sig - Route: Take 1 tablet by mouth daily - Oral    carvedilol (COREG) 3.125 MG tablet (Taking)    Sig - Route: Take 1 tablet by mouth 2 times daily (with meals) - Oral    indapamide (LOZOL) 1.25 MG tablet (Taking)    Sig - Route: Take 1 tablet by mouth daily - Oral         --will continue. Will periodically review and adjust if needed. Encourage home monitoring. Recommended restarting Flonase on daily basis. Clinically appears euthyroid.   Will continue with current thyroid supplementation dosing as providing therapeutic benefit and periodically check levels. Continue with indomethacin on rare basis for gout flare up. Will recheck renal function. Encouraged good hydration. Will continue to periodically monitor. Continue Evista, weight bearing exercises. SUBJECTIVE/OBJECTIVE:  HPI:  HTN: Home BP Monitoring: yes. taking medications as instructed, no medication side effects noted  Continues to have persistent issues with right eustachian tube dysfunction. She was recently on another round of steroids and it did completely resolve her symptoms. She has followed up with ENT. She is currently not taking Flonase on a regular basis. Has hypothyroidism and reports denies fatigue, weight changes, heat/cold intolerance, bowel/skin changes or CVS symptoms, no new problems. Only rarely uses indocin---one dose will resolve gout symptoms. CKD: Has had good urine output. Urine not overly concentrated. No dysuria/hematuria. Trying to maintain good fluid intake. ROS negative except as noted above today. Social History     Tobacco Use    Smoking status: Former     Packs/day: 1.00     Types: Cigarettes     Quit date: 1983     Years since quittin.1    Smokeless tobacco: Never   Substance Use Topics    Alcohol use: Yes     Alcohol/week: 0.0 standard drinks    Drug use: No     Vitals:    23 0926   BP: 130/70   Site: Left Upper Arm   Position: Sitting   Pulse: 83   Resp: 16   SpO2: 98%   Weight: 149 lb (67.6 kg)   Height: 5' 5\" (1.651 m)      Body mass index is 24.79 kg/m². Physical Exam  Vitals reviewed. Cardiovascular:      Rate and Rhythm: Normal rate and regular rhythm. Pulmonary:      Effort: Pulmonary effort is normal.      Breath sounds: Normal breath sounds. Skin:     General: Skin is warm and dry. Neurological:      Mental Status: She is alert. An electronic signature was used to authenticate this note.   [employer], DO

## 2023-02-08 ENCOUNTER — TELEPHONE (OUTPATIENT)
Dept: ENT CLINIC | Age: 83
End: 2023-02-08

## 2023-02-08 NOTE — TELEPHONE ENCOUNTER
Patient left  stating that she has R ear fullnes . She would like to know what Dr. Romina Devine would recommend . Patient states that this has been a problem for 3 months. She was seen in our office on 1/23/23 .

## 2023-04-15 ENCOUNTER — PATIENT MESSAGE (OUTPATIENT)
Dept: INTERNAL MEDICINE CLINIC | Facility: CLINIC | Age: 83
End: 2023-04-15

## 2023-04-15 DIAGNOSIS — I10 HTN (HYPERTENSION), BENIGN: ICD-10-CM

## 2023-04-17 RX ORDER — INDAPAMIDE 1.25 MG/1
1.25 TABLET, FILM COATED ORAL DAILY
Qty: 90 TABLET | Refills: 1 | Status: SHIPPED | OUTPATIENT
Start: 2023-04-17

## 2023-04-17 NOTE — TELEPHONE ENCOUNTER
From: Dionne Arriaga  To: Dr. Calvin Juarez  Sent: 4/15/2023 5:45 PM EDT  Subject: Refill    Dr. Jessica Sanches, could you please refill my Indapamide  1.25mgtablets? 1 a day.  thanks so much!!!!

## 2023-06-23 ENCOUNTER — NURSE TRIAGE (OUTPATIENT)
Dept: OTHER | Facility: CLINIC | Age: 83
End: 2023-06-23

## 2023-06-23 NOTE — TELEPHONE ENCOUNTER
Location of patient: [place]    [facility] [employer] [facility]. Subjective: Caller states \"It's been about 4 days. The weird thing about it, it's basically it's in the toe next to my little toe. It gets blood red, swollen up. The whole foot gets swollen and you can't stand to touch it, the sheet or anything. I have medicine that Dr. Jose Andrade gave me years ago. He doesn't want me to take it because it causes kidney damage. I have to take something. Tylenol doesn't do too much. It's just swollen across my toes, the usual. I've had Gout for years. \"     Current Symptoms: Right foot, swelling in right toes/foot, painful, redness     Hx of Gout    Onset: 4 days ago; waxing and waning    Associated Symptoms: reduced activity    Pain Severity: 5/10, goes up to 10/10; pain; waxing and waning    Temperature: Denies    What has been tried: Tylenol    LMP: NA Pregnant: NA    Recommended disposition: See in Office Today. Patient agreeable. Care advice provided, patient verbalizes understanding; denies any other questions or concerns; instructed to call back for any new or worsening symptoms. Patient/Caller agrees with recommended disposition; writer provided warm transfer to [facility] for appointment scheduling. Patient disconnected line prior to transfer. [facility] will call patient back at verified phone number. Attention Provider: Thank you for allowing me to participate in the care of your patient. The patient was connected to triage in response to information provided to the ECC/PSC. Please do not respond through this encounter as the response is not directed to a shared pool. Reason for Disposition   SEVERE pain (e.g., excruciating, unable to do any normal activities)    Protocols used:  Foot Pain-ADULT-[place]

## 2023-07-12 ENCOUNTER — PATIENT MESSAGE (OUTPATIENT)
Dept: INTERNAL MEDICINE CLINIC | Facility: CLINIC | Age: 83
End: 2023-07-12

## 2023-07-12 DIAGNOSIS — L23.7 POISON IVY DERMATITIS: Primary | ICD-10-CM

## 2023-07-12 RX ORDER — PREDNISONE 20 MG/1
TABLET ORAL
Qty: 21 TABLET | Refills: 0 | Status: SHIPPED | OUTPATIENT
Start: 2023-07-12

## 2023-07-12 RX ORDER — TRIAMCINOLONE ACETONIDE 1 MG/G
CREAM TOPICAL
Qty: 454 G | Refills: 1 | Status: SHIPPED | OUTPATIENT
Start: 2023-07-12

## 2023-08-01 SDOH — ECONOMIC STABILITY: TRANSPORTATION INSECURITY
IN THE PAST 12 MONTHS, HAS LACK OF TRANSPORTATION KEPT YOU FROM MEETINGS, WORK, OR FROM GETTING THINGS NEEDED FOR DAILY LIVING?: NO

## 2023-08-01 SDOH — ECONOMIC STABILITY: INCOME INSECURITY: HOW HARD IS IT FOR YOU TO PAY FOR THE VERY BASICS LIKE FOOD, HOUSING, MEDICAL CARE, AND HEATING?: NOT VERY HARD

## 2023-08-01 SDOH — ECONOMIC STABILITY: HOUSING INSECURITY
IN THE LAST 12 MONTHS, WAS THERE A TIME WHEN YOU DID NOT HAVE A STEADY PLACE TO SLEEP OR SLEPT IN A SHELTER (INCLUDING NOW)?: NO

## 2023-08-01 SDOH — ECONOMIC STABILITY: FOOD INSECURITY: WITHIN THE PAST 12 MONTHS, YOU WORRIED THAT YOUR FOOD WOULD RUN OUT BEFORE YOU GOT MONEY TO BUY MORE.: NEVER TRUE

## 2023-08-01 SDOH — ECONOMIC STABILITY: FOOD INSECURITY: WITHIN THE PAST 12 MONTHS, THE FOOD YOU BOUGHT JUST DIDN'T LAST AND YOU DIDN'T HAVE MONEY TO GET MORE.: NEVER TRUE

## 2023-08-01 ASSESSMENT — PATIENT HEALTH QUESTIONNAIRE - PHQ9
1. LITTLE INTEREST OR PLEASURE IN DOING THINGS: NOT AT ALL
2. FEELING DOWN, DEPRESSED OR HOPELESS: 0
SUM OF ALL RESPONSES TO PHQ QUESTIONS 1-9: 0
SUM OF ALL RESPONSES TO PHQ QUESTIONS 1-9: 0
SUM OF ALL RESPONSES TO PHQ9 QUESTIONS 1 & 2: 0
1. LITTLE INTEREST OR PLEASURE IN DOING THINGS: 0
SUM OF ALL RESPONSES TO PHQ QUESTIONS 1-9: 0
SUM OF ALL RESPONSES TO PHQ9 QUESTIONS 1 & 2: 0
2. FEELING DOWN, DEPRESSED OR HOPELESS: NOT AT ALL
SUM OF ALL RESPONSES TO PHQ QUESTIONS 1-9: 0

## 2023-08-04 ENCOUNTER — OFFICE VISIT (OUTPATIENT)
Dept: INTERNAL MEDICINE CLINIC | Facility: CLINIC | Age: 83
End: 2023-08-04

## 2023-08-04 VITALS
HEART RATE: 87 BPM | HEIGHT: 65 IN | OXYGEN SATURATION: 98 % | BODY MASS INDEX: 24.83 KG/M2 | WEIGHT: 149 LBS | TEMPERATURE: 97.7 F | SYSTOLIC BLOOD PRESSURE: 138 MMHG | DIASTOLIC BLOOD PRESSURE: 60 MMHG

## 2023-08-04 DIAGNOSIS — I10 HTN (HYPERTENSION), BENIGN: Primary | ICD-10-CM

## 2023-08-04 DIAGNOSIS — M85.89 OSTEOPENIA OF MULTIPLE SITES: ICD-10-CM

## 2023-08-04 DIAGNOSIS — E03.9 HYPOTHYROIDISM, ADULT: ICD-10-CM

## 2023-08-04 DIAGNOSIS — E78.2 MIXED HYPERLIPIDEMIA: ICD-10-CM

## 2023-08-04 LAB
ALBUMIN SERPL-MCNC: 4 G/DL (ref 3.2–4.6)
ALBUMIN/GLOB SERPL: 1.1 (ref 0.4–1.6)
ALP SERPL-CCNC: 45 U/L (ref 50–136)
ALT SERPL-CCNC: 25 U/L (ref 12–65)
ANION GAP SERPL CALC-SCNC: 7 MMOL/L (ref 2–11)
AST SERPL-CCNC: 18 U/L (ref 15–37)
BILIRUB DIRECT SERPL-MCNC: 0.1 MG/DL
BILIRUB SERPL-MCNC: 0.5 MG/DL (ref 0.2–1.1)
BUN SERPL-MCNC: 29 MG/DL (ref 8–23)
CALCIUM SERPL-MCNC: 9.7 MG/DL (ref 8.3–10.4)
CHLORIDE SERPL-SCNC: 110 MMOL/L (ref 101–110)
CHOLEST SERPL-MCNC: 114 MG/DL
CO2 SERPL-SCNC: 25 MMOL/L (ref 21–32)
CREAT SERPL-MCNC: 1 MG/DL (ref 0.6–1)
ERYTHROCYTE [DISTWIDTH] IN BLOOD BY AUTOMATED COUNT: 12.9 % (ref 11.9–14.6)
GLOBULIN SER CALC-MCNC: 3.7 G/DL (ref 2.8–4.5)
GLUCOSE SERPL-MCNC: 126 MG/DL (ref 65–100)
HCT VFR BLD AUTO: 40.2 % (ref 35.8–46.3)
HDLC SERPL-MCNC: 39 MG/DL (ref 40–60)
HDLC SERPL: 2.9
HGB BLD-MCNC: 13 G/DL (ref 11.7–15.4)
LDLC SERPL CALC-MCNC: 51.6 MG/DL
MCH RBC QN AUTO: 29.3 PG (ref 26.1–32.9)
MCHC RBC AUTO-ENTMCNC: 32.3 G/DL (ref 31.4–35)
MCV RBC AUTO: 90.7 FL (ref 82–102)
NRBC # BLD: 0 K/UL (ref 0–0.2)
PLATELET # BLD AUTO: 298 K/UL (ref 150–450)
PMV BLD AUTO: 10.6 FL (ref 9.4–12.3)
POTASSIUM SERPL-SCNC: 4.6 MMOL/L (ref 3.5–5.1)
PROT SERPL-MCNC: 7.7 G/DL (ref 6.3–8.2)
RBC # BLD AUTO: 4.43 M/UL (ref 4.05–5.2)
SODIUM SERPL-SCNC: 142 MMOL/L (ref 133–143)
TRIGL SERPL-MCNC: 117 MG/DL (ref 35–150)
TSH W FREE THYROID IF ABNORMAL: 0.7 UIU/ML (ref 0.36–3.74)
VLDLC SERPL CALC-MCNC: 23.4 MG/DL (ref 6–23)
WBC # BLD AUTO: 5.8 K/UL (ref 4.3–11.1)

## 2023-08-04 RX ORDER — ROSUVASTATIN CALCIUM 5 MG/1
5 TABLET, COATED ORAL DAILY
Qty: 90 TABLET | Refills: 1 | Status: SHIPPED | OUTPATIENT
Start: 2023-08-04

## 2023-08-04 RX ORDER — RALOXIFENE HYDROCHLORIDE 60 MG/1
60 TABLET, FILM COATED ORAL DAILY
Qty: 90 TABLET | Refills: 1 | Status: SHIPPED | OUTPATIENT
Start: 2023-08-04

## 2023-08-04 RX ORDER — LEVOTHYROXINE SODIUM 0.07 MG/1
75 TABLET ORAL DAILY
Qty: 90 TABLET | Refills: 1 | Status: SHIPPED | OUTPATIENT
Start: 2023-08-04

## 2023-08-04 RX ORDER — CARVEDILOL 3.12 MG/1
3.12 TABLET ORAL 2 TIMES DAILY WITH MEALS
Qty: 180 TABLET | Refills: 1 | Status: SHIPPED | OUTPATIENT
Start: 2023-08-04

## 2023-08-04 RX ORDER — BENAZEPRIL HYDROCHLORIDE 40 MG/1
40 TABLET, FILM COATED ORAL DAILY
Qty: 90 TABLET | Refills: 1 | Status: SHIPPED | OUTPATIENT
Start: 2023-08-04

## 2023-08-04 NOTE — PROGRESS NOTES
Claudine Vera DO  Diplomate of the [employer] [address] Internal Medicine      Amanda Soria (: 1940) is a 80 y.o. female, here for evaluation of the following chief complaint(s):  Follow-up (6 month follow up HTN)       ASSESSMENT/PLAN:  1. HTN (hypertension), benign  -     benazepril (LOTENSIN) 40 MG tablet; Take 1 tablet by mouth daily, Disp-90 tablet, R-1Normal  -     carvedilol (COREG) 3.125 MG tablet; Take 1 tablet by mouth 2 times daily (with meals), Disp-180 tablet, R-1Normal  -     Basic Metabolic Panel; Future  -     CBC; Future  2. Hypothyroidism, adult  -     levothyroxine (SYNTHROID) 75 MCG tablet; Take 1 tablet by mouth Daily, Disp-90 tablet, R-1Normal  -     TSH with Reflex; Future  3. Mixed hyperlipidemia  -     rosuvastatin (CRESTOR) 5 MG tablet; Take 1 tablet by mouth daily, Disp-90 tablet, R-1Normal  -     Hepatic Function Panel; Future  -     Lipid Panel; Future  4. Osteopenia of multiple sites  -     raloxifene (EVISTA) 60 MG tablet; Take 1 tablet by mouth daily, Disp-90 tablet, R-1Normal     -Tolerating medication well for HTN. Achieving desired therapeutic response with   Key Anti-Hypertensive Meds            benazepril (LOTENSIN) 40 MG tablet (Taking)    Sig - Route: Take 1 tablet by mouth daily - Oral    carvedilol (COREG) 3.125 MG tablet (Taking)    Sig - Route: Take 1 tablet by mouth 2 times daily (with meals) - Oral    indapamide (LOZOL) 1.25 MG tablet (Taking)    Sig - Route: Take 1 tablet by mouth daily - Oral         --will continue. Will periodically review and adjust if needed. Encourage home monitoring.   -Tolerating levothyroxine well. Will recheck TSH and make adjustments to medication dosing as indicated. -Recheck lipids and encourage healthy eating/exercise as able. -Will continue with raloxifene and weight bearing exercises with Vit D supplementation.         SUBJECTIVE/OBJECTIVE:  HPI:  -HTN: Home BP Monitoring: no. taking

## 2023-09-22 ENCOUNTER — OFFICE VISIT (OUTPATIENT)
Dept: INTERNAL MEDICINE CLINIC | Facility: CLINIC | Age: 83
End: 2023-09-22

## 2023-09-22 VITALS
SYSTOLIC BLOOD PRESSURE: 126 MMHG | HEART RATE: 78 BPM | DIASTOLIC BLOOD PRESSURE: 74 MMHG | RESPIRATION RATE: 17 BRPM | WEIGHT: 150 LBS | BODY MASS INDEX: 24.99 KG/M2 | OXYGEN SATURATION: 98 % | HEIGHT: 65 IN

## 2023-09-22 DIAGNOSIS — L03.90 CELLULITIS, UNSPECIFIED CELLULITIS SITE: Primary | ICD-10-CM
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
## 2023-09-22 DIAGNOSIS — Z00.00 MEDICARE ANNUAL WELLNESS VISIT, SUBSEQUENT: ICD-10-CM

## 2023-09-22 DIAGNOSIS — S80.869A INSECT BITE OF LOWER LEG, UNSPECIFIED LATERALITY, INITIAL ENCOUNTER: ICD-10-CM

## 2023-09-22 RX ORDER — DOXYCYCLINE HYCLATE 100 MG
100 TABLET ORAL 2 TIMES DAILY
Qty: 14 TABLET | Refills: 0 | Status: SHIPPED | OUTPATIENT
Start: 2023-09-22 | End: 2023-09-29

## 2023-09-22 RX ORDER — TRIAMCINOLONE ACETONIDE 1 MG/G
CREAM TOPICAL
Qty: 454 G | Refills: 1 | Status: SHIPPED | OUTPATIENT
Start: 2023-09-22

## 2023-09-22 ASSESSMENT — PATIENT HEALTH QUESTIONNAIRE - PHQ9
SUM OF ALL RESPONSES TO PHQ QUESTIONS 1-9: 0
1. LITTLE INTEREST OR PLEASURE IN DOING THINGS: 0
SUM OF ALL RESPONSES TO PHQ QUESTIONS 1-9: 0
SUM OF ALL RESPONSES TO PHQ QUESTIONS 1-9: 0
SUM OF ALL RESPONSES TO PHQ9 QUESTIONS 1 & 2: 0
SUM OF ALL RESPONSES TO PHQ QUESTIONS 1-9: 0
2. FEELING DOWN, DEPRESSED OR HOPELESS: 0

## 2023-09-22 ASSESSMENT — LIFESTYLE VARIABLES
HOW OFTEN DURING THE LAST YEAR HAVE YOU HAD A FEELING OF GUILT OR REMORSE AFTER DRINKING: 0
HOW OFTEN DURING THE LAST YEAR HAVE YOU FOUND THAT YOU WERE NOT ABLE TO STOP DRINKING ONCE YOU HAD STARTED: 0
HOW OFTEN DURING THE LAST YEAR HAVE YOU NEEDED AN ALCOHOLIC DRINK FIRST THING IN THE MORNING TO GET YOURSELF GOING AFTER A NIGHT OF HEAVY DRINKING: 0
HAS A RELATIVE, FRIEND, DOCTOR, OR ANOTHER HEALTH PROFESSIONAL EXPRESSED CONCERN ABOUT YOUR DRINKING OR SUGGESTED YOU CUT DOWN: 0
HOW OFTEN DURING THE LAST YEAR HAVE YOU FAILED TO DO WHAT WAS NORMALLY EXPECTED FROM YOU BECAUSE OF DRINKING: 0
HOW OFTEN DO YOU HAVE A DRINK CONTAINING ALCOHOL: 4 OR MORE TIMES A WEEK
HOW MANY STANDARD DRINKS CONTAINING ALCOHOL DO YOU HAVE ON A TYPICAL DAY: 1 OR 2
HAVE YOU OR SOMEONE ELSE BEEN INJURED AS A RESULT OF YOUR DRINKING: 0
HOW OFTEN DURING THE LAST YEAR HAVE YOU BEEN UNABLE TO REMEMBER WHAT HAPPENED THE NIGHT BEFORE BECAUSE YOU HAD BEEN DRINKING: 0

## 2023-09-22 NOTE — PROGRESS NOTES
Medicare Annual Wellness Visit    [address] is here for Insect Bite (Left leg) and Medicare AWV    Assessment & Plan   Cellulitis, unspecified cellulitis site  -     doxycycline hyclate (VIBRA-TABS) 100 MG tablet; Take 1 tablet by mouth 2 times daily for 7 days, Disp-14 tablet, R-0Normal  Insect bite of lower leg, unspecified laterality, initial encounter  -     triamcinolone (KENALOG) 0.1 % cream; Apply topically 2 times daily. , Disp-454 g, R-1, Normal  Medicare annual wellness visit, subsequent      Instructed on the proper use of antibiotics. Also stressed the importance of taking the full course to help prevent the risk of resistance. Advised taking pro-biotics (yogurt, align or equivalent) while on antibiotics to reduce the risk of C. Diff infection. Topical triamcinolone for itching. Recommendations for Preventive Services Due: see orders and patient instructions/AVS.  Recommended screening schedule for the next 5-10 years is provided to the patient in written form: see Patient Instructions/AVS.     Return if symptoms worsen or fail to improve. Subjective   The following acute and/or chronic problems were also addressed today:  Pt was cleaning, removing spider webs and received several bites to legs and arms. Thinks bites have become infected. Has been using topical hydrocortisone due to severe itching. ROS negative except as noted above today. Patient's complete Health Risk Assessment and screening values have been reviewed and are found in Flowsheets. The following problems were reviewed today and where indicated follow up appointments were made and/or referrals ordered.     Positive Risk Factor Screenings with Interventions:                 Weight and Activity:  Physical Activity: Inactive (9/22/2023)    Exercise Vital Sign     Days of Exercise per Week: 0 days     Minutes of Exercise per Session: 0 min     On average, how many days per week do you engage in moderate to strenuous

## 2023-10-26 DIAGNOSIS — I10 HTN (HYPERTENSION), BENIGN: ICD-10-CM

## 2023-10-27 RX ORDER — INDAPAMIDE 1.25 MG/1
1.25 TABLET ORAL DAILY
Qty: 90 TABLET | Refills: 1 | Status: SHIPPED | OUTPATIENT
Start: 2023-10-27

## 2023-11-02 ENCOUNTER — NURSE ONLY (OUTPATIENT)
Dept: INTERNAL MEDICINE CLINIC | Facility: CLINIC | Age: 83
End: 2023-11-02
Payer: MEDICARE

## 2023-11-02 ENCOUNTER — TELEPHONE (OUTPATIENT)
Dept: INTERNAL MEDICINE CLINIC | Facility: CLINIC | Age: 83
End: 2023-11-02

## 2023-11-02 DIAGNOSIS — R30.9 URINARY PAIN: Primary | ICD-10-CM

## 2023-11-02 DIAGNOSIS — N30.00 ACUTE CYSTITIS WITHOUT HEMATURIA: Primary | ICD-10-CM

## 2023-11-02 LAB
BILIRUBIN, URINE, POC: NEGATIVE
BLOOD URINE, POC: NORMAL
GLUCOSE URINE, POC: NEGATIVE
KETONES, URINE, POC: POSITIVE
LEUKOCYTE ESTERASE, URINE, POC: NORMAL
NITRITE, URINE, POC: NEGATIVE
PH, URINE, POC: 6.09 (ref 4.6–8)
PROTEIN,URINE, POC: POSITIVE
SPECIFIC GRAVITY, URINE, POC: 1.03 (ref 1–1.03)
URINALYSIS CLARITY, POC: CLEAR
URINALYSIS COLOR, POC: YELLOW
UROBILINOGEN, POC: NORMAL

## 2023-11-02 PROCEDURE — 81003 URINALYSIS AUTO W/O SCOPE: CPT | Performed by: FAMILY MEDICINE

## 2023-11-02 RX ORDER — NITROFURANTOIN MACROCRYSTALS 100 MG/1
100 CAPSULE ORAL 2 TIMES DAILY
Qty: 10 CAPSULE | Refills: 0 | Status: SHIPPED | OUTPATIENT
Start: 2023-11-02 | End: 2023-11-07

## 2023-11-02 NOTE — TELEPHONE ENCOUNTER
[facility] and [facility]. Notified patient to bring in urine specimen. Specimen showed possible UTI (see results notes) There was not enough to culture. Please advise and send in abx if warranted.

## 2023-11-07 ENCOUNTER — PATIENT MESSAGE (OUTPATIENT)
Dept: INTERNAL MEDICINE CLINIC | Facility: CLINIC | Age: 83
End: 2023-11-07

## 2023-11-07 DIAGNOSIS — M1A.0790 CHRONIC IDIOPATHIC GOUT INVOLVING TOE WITHOUT TOPHUS, UNSPECIFIED LATERALITY: Primary | ICD-10-CM

## 2023-11-07 RX ORDER — METHYLPREDNISOLONE 4 MG/1
TABLET ORAL
Qty: 1 KIT | Refills: 0 | Status: SHIPPED | OUTPATIENT
Start: 2023-11-07

## 2023-12-04 ENCOUNTER — TELEPHONE (OUTPATIENT)
Dept: INTERNAL MEDICINE CLINIC | Facility: CLINIC | Age: 83
End: 2023-12-04

## 2023-12-04 ENCOUNTER — OFFICE VISIT (OUTPATIENT)
Dept: INTERNAL MEDICINE CLINIC | Facility: CLINIC | Age: 83
End: 2023-12-04
Payer: MEDICARE

## 2023-12-04 VITALS
WEIGHT: 149 LBS | BODY MASS INDEX: 24.83 KG/M2 | HEIGHT: 65 IN | TEMPERATURE: 98.3 F | SYSTOLIC BLOOD PRESSURE: 134 MMHG | DIASTOLIC BLOOD PRESSURE: 62 MMHG | OXYGEN SATURATION: 99 % | HEART RATE: 84 BPM

## 2023-12-04 DIAGNOSIS — R73.01 IMPAIRED FASTING BLOOD SUGAR: ICD-10-CM

## 2023-12-04 DIAGNOSIS — M10.079 ACUTE IDIOPATHIC GOUT OF FOOT, UNSPECIFIED LATERALITY: Primary | ICD-10-CM

## 2023-12-04 LAB
ALBUMIN SERPL-MCNC: 3.9 G/DL (ref 3.2–4.6)
ALBUMIN/GLOB SERPL: 1.1 (ref 0.4–1.6)
ALP SERPL-CCNC: 52 U/L (ref 50–136)
ALT SERPL-CCNC: 20 U/L (ref 12–65)
ANION GAP SERPL CALC-SCNC: 8 MMOL/L (ref 2–11)
AST SERPL-CCNC: 18 U/L (ref 15–37)
BILIRUB SERPL-MCNC: 0.3 MG/DL (ref 0.2–1.1)
BUN SERPL-MCNC: 28 MG/DL (ref 8–23)
CALCIUM SERPL-MCNC: 10.3 MG/DL (ref 8.3–10.4)
CHLORIDE SERPL-SCNC: 108 MMOL/L (ref 101–110)
CO2 SERPL-SCNC: 25 MMOL/L (ref 21–32)
CREAT SERPL-MCNC: 1 MG/DL (ref 0.6–1)
ERYTHROCYTE [DISTWIDTH] IN BLOOD BY AUTOMATED COUNT: 12.6 % (ref 11.9–14.6)
GLOBULIN SER CALC-MCNC: 3.7 G/DL (ref 2.8–4.5)
GLUCOSE SERPL-MCNC: 89 MG/DL (ref 65–100)
HCT VFR BLD AUTO: 37.6 % (ref 35.8–46.3)
HGB BLD-MCNC: 12 G/DL (ref 11.7–15.4)
MCH RBC QN AUTO: 28.7 PG (ref 26.1–32.9)
MCHC RBC AUTO-ENTMCNC: 31.9 G/DL (ref 31.4–35)
MCV RBC AUTO: 90 FL (ref 82–102)
NRBC # BLD: 0 K/UL (ref 0–0.2)
PLATELET # BLD AUTO: 330 K/UL (ref 150–450)
PMV BLD AUTO: 10.5 FL (ref 9.4–12.3)
POTASSIUM SERPL-SCNC: 4.2 MMOL/L (ref 3.5–5.1)
PROT SERPL-MCNC: 7.6 G/DL (ref 6.3–8.2)
RBC # BLD AUTO: 4.18 M/UL (ref 4.05–5.2)
SODIUM SERPL-SCNC: 141 MMOL/L (ref 133–143)
URATE SERPL-MCNC: 8.3 MG/DL (ref 2.6–6)
WBC # BLD AUTO: 7.2 K/UL (ref 4.3–11.1)

## 2023-12-04 PROCEDURE — G8420 CALC BMI NORM PARAMETERS: HCPCS | Performed by: NURSE PRACTITIONER

## 2023-12-04 PROCEDURE — 1123F ACP DISCUSS/DSCN MKR DOCD: CPT | Performed by: NURSE PRACTITIONER

## 2023-12-04 PROCEDURE — G8427 DOCREV CUR MEDS BY ELIG CLIN: HCPCS | Performed by: NURSE PRACTITIONER

## 2023-12-04 PROCEDURE — 99214 OFFICE O/P EST MOD 30 MIN: CPT | Performed by: NURSE PRACTITIONER

## 2023-12-04 PROCEDURE — 3075F SYST BP GE 130 - 139MM HG: CPT | Performed by: NURSE PRACTITIONER

## 2023-12-04 PROCEDURE — 1036F TOBACCO NON-USER: CPT | Performed by: NURSE PRACTITIONER

## 2023-12-04 PROCEDURE — 1090F PRES/ABSN URINE INCON ASSESS: CPT | Performed by: NURSE PRACTITIONER

## 2023-12-04 PROCEDURE — 3078F DIAST BP <80 MM HG: CPT | Performed by: NURSE PRACTITIONER

## 2023-12-04 PROCEDURE — G8484 FLU IMMUNIZE NO ADMIN: HCPCS | Performed by: NURSE PRACTITIONER

## 2023-12-04 PROCEDURE — G8399 PT W/DXA RESULTS DOCUMENT: HCPCS | Performed by: NURSE PRACTITIONER

## 2023-12-04 RX ORDER — PREDNISONE 20 MG/1
TABLET ORAL
Qty: 10 TABLET | Refills: 0 | Status: SHIPPED | OUTPATIENT
Start: 2023-12-04 | End: 2023-12-04 | Stop reason: SDUPTHER

## 2023-12-04 RX ORDER — PREDNISONE 20 MG/1
TABLET ORAL
Qty: 20 TABLET | Refills: 0 | Status: SHIPPED | OUTPATIENT
Start: 2023-12-04

## 2023-12-04 NOTE — PROGRESS NOTES
Yolie Cabrera (:  1940) is a 80 y.o. female,Established patient, here for evaluation of the following chief complaint(s):  Gout (In both feet/)         ASSESSMENT/PLAN:  1. Acute idiopathic gout of foot, unspecified laterality  -     CBC; Future  -     Comprehensive Metabolic Panel; Future  -     Uric Acid; Future  2. Impaired fasting blood sugar  -     Hemoglobin A1C; Future      No follow-ups on file. Gout in bilateral feet and knee pain, likely also gout  Check uric acid  Doesn't like steroid but agrees to another taper as has interactions with colchicine  Hydrate well  Requests A1c check   F/u to see improvement in gout        Subjective   SUBJECTIVE/OBJECTIVE:  Patient is here for feet pain. She has had the worst gout flare. She has hx of gout for years off and on. She was given a steroid pack recently  She got gout in right foot and she didn't take the indomethacin because of her kidney function. The pain started in the right second/third toes so she didn't take anything at all for a while. It got so severe and swollen and red and horrible. She waited way too long - she got prednisone but it worked but didn't end it. She finished the steroid and then started ibuprofen for the pain. She then started to get near finished that pain and then it started in the left foot in the great toe. It got red and swollen and painful. It is still a little swollen. She then felt knee pain when getting up from the toilet, it just hurts in the knee but is a little swollen. Gout        Review of Systems   Musculoskeletal:  Positive for gout. Objective   Physical Exam             An electronic signature was used to authenticate this note.     -- Bowels, APRN - CNP

## 2023-12-04 NOTE — TELEPHONE ENCOUNTER
[facility] [place] called and states that they are needing clarification on the patients prescription for her predniSONE (DELTASONE) 20 MG tablet. Please Advise.

## 2023-12-05 ENCOUNTER — PATIENT MESSAGE (OUTPATIENT)
Dept: INTERNAL MEDICINE CLINIC | Facility: CLINIC | Age: 83
End: 2023-12-05

## 2023-12-05 ENCOUNTER — TELEPHONE (OUTPATIENT)
Dept: INTERNAL MEDICINE CLINIC | Facility: CLINIC | Age: 83
End: 2023-12-05

## 2023-12-05 LAB
EST. AVERAGE GLUCOSE BLD GHB EST-MCNC: 148 MG/DL
HBA1C MFR BLD: 6.8 % (ref 4.8–5.6)

## 2023-12-05 RX ORDER — GLUCOSAMINE HCL/CHONDROITIN SU 500-400 MG
CAPSULE ORAL
Qty: 100 STRIP | Refills: 3 | Status: SHIPPED | OUTPATIENT
Start: 2023-12-05 | End: 2023-12-05 | Stop reason: SDUPTHER

## 2023-12-05 RX ORDER — LANCETS 30 GAUGE
1 EACH MISCELLANEOUS DAILY
Qty: 100 EACH | Refills: 5 | Status: SHIPPED | OUTPATIENT
Start: 2023-12-05 | End: 2023-12-05 | Stop reason: SDUPTHER

## 2023-12-05 RX ORDER — BLOOD-GLUCOSE METER
KIT MISCELLANEOUS
Qty: 1 KIT | Refills: 0 | Status: SHIPPED | OUTPATIENT
Start: 2023-12-05

## 2023-12-05 RX ORDER — LANCETS 30 GAUGE
EACH MISCELLANEOUS
Qty: 100 EACH | Refills: 5 | Status: SHIPPED | OUTPATIENT
Start: 2023-12-05

## 2023-12-05 RX ORDER — GLUCOSAMINE HCL/CHONDROITIN SU 500-400 MG
CAPSULE ORAL
Qty: 100 STRIP | Refills: 5 | Status: SHIPPED | OUTPATIENT
Start: 2023-12-05

## 2023-12-05 RX ORDER — BLOOD-GLUCOSE METER
1 KIT MISCELLANEOUS DAILY
Qty: 1 KIT | Refills: 0 | Status: SHIPPED | OUTPATIENT
Start: 2023-12-05 | End: 2023-12-05 | Stop reason: SDUPTHER

## 2023-12-05 NOTE — TELEPHONE ENCOUNTER
I would recommend a f/u appt after she finished the steroid to discuss gout prevention and diabetes trx. I sent over glucometer.

## 2023-12-05 NOTE — TELEPHONE ENCOUNTER
From: Emir Piedra  To: Melva Hem  Sent: 12/5/2023 8:02 AM EST  Subject: Gout    Just got my test results! Not surprised but not happy with A1C! What do I need to do now and do I need another appointment, I never saw you after my bloodwork so I left! Really enjoyed my visit with you!!! [facility] did not have the Rx for prednisone yesterday so I guess I will get it today! I would like to get a glucose monitor! Years ago I never had to pay to get anything. Insurance always paid! Can you get one for me or what do I need to do? Thanks again for everything!

## 2023-12-05 NOTE — TELEPHONE ENCOUNTER
[facility] in [place] called and states that they are needing the max daily amount use on her test strips. Please Advise.

## 2023-12-31 ENCOUNTER — PATIENT MESSAGE (OUTPATIENT)
Dept: INTERNAL MEDICINE CLINIC | Facility: CLINIC | Age: 83
End: 2023-12-31

## 2023-12-31 DIAGNOSIS — R60.0 BILATERAL LEG EDEMA: Primary | ICD-10-CM

## 2024-01-02 RX ORDER — FUROSEMIDE 20 MG/1
20 TABLET ORAL DAILY PRN
Qty: 30 TABLET | Refills: 5 | Status: SHIPPED | OUTPATIENT
Start: 2024-01-02

## 2024-01-02 NOTE — TELEPHONE ENCOUNTER
-she needs to stay on allupurinol for now.  -will send out lasix 20mg daily as needed for swelling.  -Bring in Friday.  
From: Amanda Morris  To: Dr. Anthony Babcokc  Sent: 12/31/2023 7:31 PM EST  Subject: Allopurinol    Dr. Babcock, I started allopurinol but just not happy taking it! Having swelling in my ankles and I never have that. I was taken off the Indapamide. Having some nausea . Surely there is something else to do. I really don’t like having swollen ankles. It says you can’t just quit taking this so Ihope to hear from you soon! Hope you have a happy new year!!!!! Thanks!  
PAIN/NAUSEA

## 2024-01-03 ASSESSMENT — PATIENT HEALTH QUESTIONNAIRE - PHQ9
2. FEELING DOWN, DEPRESSED OR HOPELESS: 0
1. LITTLE INTEREST OR PLEASURE IN DOING THINGS: NOT AT ALL
1. LITTLE INTEREST OR PLEASURE IN DOING THINGS: 0
SUM OF ALL RESPONSES TO PHQ QUESTIONS 1-9: 0
SUM OF ALL RESPONSES TO PHQ9 QUESTIONS 1 & 2: 0
2. FEELING DOWN, DEPRESSED OR HOPELESS: NOT AT ALL
SUM OF ALL RESPONSES TO PHQ9 QUESTIONS 1 & 2: 0
SUM OF ALL RESPONSES TO PHQ QUESTIONS 1-9: 0

## 2024-01-05 ENCOUNTER — OFFICE VISIT (OUTPATIENT)
Dept: INTERNAL MEDICINE CLINIC | Facility: CLINIC | Age: 84
End: 2024-01-05

## 2024-01-05 VITALS
BODY MASS INDEX: 25.66 KG/M2 | OXYGEN SATURATION: 97 % | HEIGHT: 65 IN | DIASTOLIC BLOOD PRESSURE: 86 MMHG | RESPIRATION RATE: 16 BRPM | HEART RATE: 76 BPM | WEIGHT: 154 LBS | SYSTOLIC BLOOD PRESSURE: 184 MMHG

## 2024-01-05 DIAGNOSIS — N18.31 CHRONIC KIDNEY DISEASE, STAGE 3A (HCC): ICD-10-CM

## 2024-01-05 DIAGNOSIS — M1A.0790 CHRONIC IDIOPATHIC GOUT INVOLVING TOE WITHOUT TOPHUS, UNSPECIFIED LATERALITY: ICD-10-CM

## 2024-01-05 DIAGNOSIS — M1A.09X0 IDIOPATHIC CHRONIC GOUT OF MULTIPLE SITES WITHOUT TOPHUS: ICD-10-CM

## 2024-01-05 DIAGNOSIS — I10 HTN (HYPERTENSION), BENIGN: Primary | ICD-10-CM

## 2024-01-05 LAB
ALBUMIN SERPL-MCNC: 3.5 G/DL (ref 3.2–4.6)
ALBUMIN/GLOB SERPL: 1.1 (ref 0.4–1.6)
ALP SERPL-CCNC: 48 U/L (ref 50–136)
ALT SERPL-CCNC: 26 U/L (ref 12–65)
ANION GAP SERPL CALC-SCNC: 4 MMOL/L (ref 2–11)
AST SERPL-CCNC: 17 U/L (ref 15–37)
BILIRUB SERPL-MCNC: 0.4 MG/DL (ref 0.2–1.1)
BUN SERPL-MCNC: 25 MG/DL (ref 8–23)
CALCIUM SERPL-MCNC: 9.2 MG/DL (ref 8.3–10.4)
CHLORIDE SERPL-SCNC: 111 MMOL/L (ref 103–113)
CO2 SERPL-SCNC: 24 MMOL/L (ref 21–32)
CREAT SERPL-MCNC: 1.2 MG/DL (ref 0.6–1)
GLOBULIN SER CALC-MCNC: 3.3 G/DL (ref 2.8–4.5)
GLUCOSE SERPL-MCNC: 121 MG/DL (ref 65–100)
POTASSIUM SERPL-SCNC: 4.4 MMOL/L (ref 3.5–5.1)
PROT SERPL-MCNC: 6.8 G/DL (ref 6.3–8.2)
SODIUM SERPL-SCNC: 139 MMOL/L (ref 136–146)
URATE SERPL-MCNC: 6.6 MG/DL (ref 2.6–6)

## 2024-01-05 RX ORDER — OLMESARTAN MEDOXOMIL 40 MG/1
40 TABLET ORAL DAILY
Qty: 30 TABLET | Refills: 5 | Status: SHIPPED | OUTPATIENT
Start: 2024-01-05

## 2024-01-05 RX ORDER — HYDRALAZINE HYDROCHLORIDE 25 MG/1
25 TABLET, FILM COATED ORAL 3 TIMES DAILY
Qty: 90 TABLET | Refills: 5 | Status: SHIPPED | OUTPATIENT
Start: 2024-01-05

## 2024-01-05 NOTE — PATIENT INSTRUCTIONS
-stop the benazepril  -start olmesartan for blood pressure  -start hydralazine for blood pressure  -continue allopurinol (gout)  -take furosemide daily as needed for swelling

## 2024-01-05 NOTE — PROGRESS NOTES
Anthony Babcock DO  Diplomate of the American Board of Family Medicine  [facility]      Amanda Morris (: 1940) is a 83 y.o. female, here for evaluation of the following chief complaint(s):  Swelling (After being taken off of Indapamide ) and Knee Pain (left)       ASSESSMENT/PLAN:  1. HTN (hypertension), benign  -     olmesartan (BENICAR) 40 MG tablet; Take 1 tablet by mouth daily, Disp-30 tablet, R-5Normal  -     hydrALAZINE (APRESOLINE) 25 MG tablet; Take 1 tablet by mouth 3 times daily, Disp-90 tablet, R-5Normal  2. Chronic kidney disease, stage 3a (HCC)  3. Chronic idiopathic gout involving toe without tophus, unspecified laterality  4. Idiopathic chronic gout of multiple sites without tophus  -     Uric Acid  -     Comprehensive Metabolic Panel       -We will switch over to olmesartan 40 mg daily add on hydralazine.  Continue carvedilol.  She cannot take amlodipine due to previous profound edema.  Recheck renal function and encourage good hydration.  Continue with allopurinol at current dosing.  Stressed dietary adherence.  Will also continue with indomethacin only on as needed basis.  Recheck uric acid.      SUBJECTIVE/OBJECTIVE:  HPI:  Patient comes in today for follow-up on her blood pressure.  We had stopped indapamide due to severe gout.  Blood pressures at home have been running anywhere from 150s to 180s systolic.  She denies any chest pain or palpitations associated with this.  She did bring her home blood pressure cuff in as well.  Gout has been improved as she has been taking some indomethacin.  She does continue on allopurinol.  She has developed some bilateral lower extremity edema, but has not taken Lasix because she was unsure about this.  This started to occur after coming off of the indapamide.  ROS negative except as noted above today.    Social History     Tobacco Use    Smoking status: Former     Current packs/day: 0.00     Types: Cigarettes     Quit

## 2024-01-10 ENCOUNTER — PATIENT MESSAGE (OUTPATIENT)
Dept: INTERNAL MEDICINE CLINIC | Facility: CLINIC | Age: 84
End: 2024-01-10

## 2024-01-10 RX ORDER — FLUCONAZOLE 150 MG/1
TABLET ORAL
Qty: 2 TABLET | Refills: 0 | Status: SHIPPED | OUTPATIENT
Start: 2024-01-10

## 2024-01-10 NOTE — TELEPHONE ENCOUNTER
From: Amanda Morris  To: Carol Ying Jaimes  Sent: 1/10/2024 5:16 AM EST  Subject: Vaginal itch    Carol Ying, I have not had this   Kind of thing in years I do not have a discharge! Could youplease call me something in? Hope it’s not from the new meds! Very uncomfortable!!! Thanks so much!!!!!

## 2024-01-11 DIAGNOSIS — M10.9 GOUT, UNSPECIFIED CAUSE, UNSPECIFIED CHRONICITY, UNSPECIFIED SITE: Primary | ICD-10-CM

## 2024-01-22 ENCOUNTER — OFFICE VISIT (OUTPATIENT)
Dept: INTERNAL MEDICINE CLINIC | Facility: CLINIC | Age: 84
End: 2024-01-22
Payer: MEDICARE

## 2024-01-22 VITALS
HEIGHT: 65 IN | WEIGHT: 148 LBS | RESPIRATION RATE: 16 BRPM | HEART RATE: 96 BPM | OXYGEN SATURATION: 97 % | SYSTOLIC BLOOD PRESSURE: 136 MMHG | DIASTOLIC BLOOD PRESSURE: 70 MMHG | BODY MASS INDEX: 24.66 KG/M2

## 2024-01-22 DIAGNOSIS — I10 WHITE COAT SYNDROME WITH HYPERTENSION: Primary | ICD-10-CM

## 2024-01-22 DIAGNOSIS — N18.31 CHRONIC KIDNEY DISEASE, STAGE 3A (HCC): ICD-10-CM

## 2024-01-22 DIAGNOSIS — M1A.0790 CHRONIC IDIOPATHIC GOUT INVOLVING TOE WITHOUT TOPHUS, UNSPECIFIED LATERALITY: ICD-10-CM

## 2024-01-22 PROCEDURE — G8484 FLU IMMUNIZE NO ADMIN: HCPCS | Performed by: FAMILY MEDICINE

## 2024-01-22 PROCEDURE — G8427 DOCREV CUR MEDS BY ELIG CLIN: HCPCS | Performed by: FAMILY MEDICINE

## 2024-01-22 PROCEDURE — G8420 CALC BMI NORM PARAMETERS: HCPCS | Performed by: FAMILY MEDICINE

## 2024-01-22 PROCEDURE — 3078F DIAST BP <80 MM HG: CPT | Performed by: FAMILY MEDICINE

## 2024-01-22 PROCEDURE — 1123F ACP DISCUSS/DSCN MKR DOCD: CPT | Performed by: FAMILY MEDICINE

## 2024-01-22 PROCEDURE — 1036F TOBACCO NON-USER: CPT | Performed by: FAMILY MEDICINE

## 2024-01-22 PROCEDURE — 99214 OFFICE O/P EST MOD 30 MIN: CPT | Performed by: FAMILY MEDICINE

## 2024-01-22 PROCEDURE — 3075F SYST BP GE 130 - 139MM HG: CPT | Performed by: FAMILY MEDICINE

## 2024-01-22 PROCEDURE — 1090F PRES/ABSN URINE INCON ASSESS: CPT | Performed by: FAMILY MEDICINE

## 2024-01-22 PROCEDURE — G8399 PT W/DXA RESULTS DOCUMENT: HCPCS | Performed by: FAMILY MEDICINE

## 2024-01-22 NOTE — PROGRESS NOTES
Anthony Babcock DO  Diplomate of the American Board of Family Medicine  [facility]      Amanda Morris (: 1940) is a 83 y.o. female, here for evaluation of the following chief complaint(s):  Hypertension       ASSESSMENT/PLAN:  1. White coat syndrome with hypertension  2. Chronic idiopathic gout involving toe without tophus, unspecified laterality  3. Chronic kidney disease, stage 3a (HCC)     Tolerating medication well for HTN. Achieving desired therapeutic response with   Key Anti-Hypertensive Meds            olmesartan (BENICAR) 40 MG tablet (Taking)    Sig - Route: Take 1 tablet by mouth daily - Oral    hydrALAZINE (APRESOLINE) 25 MG tablet (Taking)    Sig - Route: Take 1 tablet by mouth 3 times daily - Oral    furosemide (LASIX) 20 MG tablet (Taking)    Sig - Route: Take 1 tablet by mouth daily as needed (leg swelling) - Oral    carvedilol (COREG) 3.125 MG tablet (Taking)    Sig - Route: Take 1 tablet by mouth 2 times daily (with meals) - Oral         --will continue. Will periodically review and adjust if needed.  Encourage home monitoring.   -Continue with allopurinol and dietary adherence.  -Encourage good hydration and will periodically monitor renal function.        SUBJECTIVE/OBJECTIVE:  HPI:  Patient's blood pressure is actually doing much better now.  She states running in the 120s 130s at home systolic.  Tolerating medication well without significant adverse effects.  No chest pain.  Gout has finally come under control.  She is taking allopurinol as directed and taking a tart cherry supplement.  Joint pain has much improved.  Reports good urine output.  No dysuria or hematuria.  ROS negative except as noted above today.    Social History     Tobacco Use    Smoking status: Former     Current packs/day: 0.00     Types: Cigarettes     Quit date: 1983     Years since quittin.0    Smokeless tobacco: Never   Substance Use Topics    Alcohol use: Yes

## 2024-01-23 ENCOUNTER — PATIENT MESSAGE (OUTPATIENT)
Dept: INTERNAL MEDICINE CLINIC | Facility: CLINIC | Age: 84
End: 2024-01-23

## 2024-01-23 PROBLEM — N18.30 CONTROLLED TYPE 2 DIABETES MELLITUS WITH STAGE 3 CHRONIC KIDNEY DISEASE, WITHOUT LONG-TERM CURRENT USE OF INSULIN (HCC): Status: ACTIVE | Noted: 2024-01-23

## 2024-01-23 PROBLEM — E11.22 CONTROLLED TYPE 2 DIABETES MELLITUS WITH STAGE 3 CHRONIC KIDNEY DISEASE, WITHOUT LONG-TERM CURRENT USE OF INSULIN (HCC): Status: ACTIVE | Noted: 2024-01-23

## 2024-01-24 NOTE — TELEPHONE ENCOUNTER
From: Amanda Morris  To: Dr. Anthony Babcock  Sent: 1/23/2024 6:59 AM EST  Subject: Pain relievers    Dr Babcock, I forgot to ask you yesterday, what pain relievers can I take now? I have only been using Tylenol. Can I use Ibuprofen now or what do you recommend? Thanks!

## 2024-01-26 DIAGNOSIS — M1A.0790 CHRONIC IDIOPATHIC GOUT INVOLVING TOE WITHOUT TOPHUS, UNSPECIFIED LATERALITY: Primary | ICD-10-CM

## 2024-01-28 DIAGNOSIS — M85.89 OSTEOPENIA OF MULTIPLE SITES: ICD-10-CM

## 2024-01-28 DIAGNOSIS — E78.2 MIXED HYPERLIPIDEMIA: ICD-10-CM

## 2024-01-30 DIAGNOSIS — M85.89 OSTEOPENIA OF MULTIPLE SITES: ICD-10-CM

## 2024-01-30 DIAGNOSIS — E03.9 HYPOTHYROIDISM, ADULT: ICD-10-CM

## 2024-01-30 DIAGNOSIS — E78.2 MIXED HYPERLIPIDEMIA: ICD-10-CM

## 2024-01-30 RX ORDER — RALOXIFENE HYDROCHLORIDE 60 MG/1
60 TABLET, FILM COATED ORAL DAILY
Qty: 90 TABLET | Refills: 1 | Status: SHIPPED | OUTPATIENT
Start: 2024-01-30

## 2024-01-30 RX ORDER — ROSUVASTATIN CALCIUM 5 MG/1
5 TABLET, COATED ORAL DAILY
Qty: 90 TABLET | Refills: 1 | Status: SHIPPED | OUTPATIENT
Start: 2024-01-30

## 2024-01-30 RX ORDER — LEVOTHYROXINE SODIUM 0.07 MG/1
75 TABLET ORAL DAILY
Qty: 90 TABLET | Refills: 1 | Status: SHIPPED | OUTPATIENT
Start: 2024-01-30

## 2024-01-30 RX ORDER — ROSUVASTATIN CALCIUM 5 MG/1
5 TABLET, COATED ORAL DAILY
Qty: 90 TABLET | Refills: 1 | OUTPATIENT
Start: 2024-01-30

## 2024-01-30 RX ORDER — RALOXIFENE HYDROCHLORIDE 60 MG/1
60 TABLET, FILM COATED ORAL DAILY
Qty: 90 TABLET | Refills: 1 | OUTPATIENT
Start: 2024-01-30

## 2024-02-15 DIAGNOSIS — I10 HTN (HYPERTENSION), BENIGN: ICD-10-CM

## 2024-02-16 DIAGNOSIS — I10 HTN (HYPERTENSION), BENIGN: ICD-10-CM

## 2024-02-16 RX ORDER — CARVEDILOL 3.12 MG/1
3.12 TABLET ORAL 2 TIMES DAILY WITH MEALS
Qty: 180 TABLET | Refills: 1 | Status: SHIPPED | OUTPATIENT
Start: 2024-02-16

## 2024-02-16 RX ORDER — CARVEDILOL 3.12 MG/1
3.12 TABLET ORAL 2 TIMES DAILY WITH MEALS
Qty: 180 TABLET | Refills: 1 | OUTPATIENT
Start: 2024-02-16

## 2024-02-16 RX ORDER — ALLOPURINOL 100 MG/1
50 TABLET ORAL DAILY
Qty: 30 TABLET | Refills: 5 | Status: SHIPPED | OUTPATIENT
Start: 2024-02-16

## 2024-02-19 ENCOUNTER — PATIENT MESSAGE (OUTPATIENT)
Dept: INTERNAL MEDICINE CLINIC | Facility: CLINIC | Age: 84
End: 2024-02-19

## 2024-02-21 RX ORDER — ALLOPURINOL 100 MG/1
100 TABLET ORAL DAILY
Qty: 30 TABLET | Refills: 5 | Status: SHIPPED | OUTPATIENT
Start: 2024-02-21

## 2024-03-06 ENCOUNTER — OFFICE VISIT (OUTPATIENT)
Dept: INTERNAL MEDICINE CLINIC | Facility: CLINIC | Age: 84
End: 2024-03-06
Payer: MEDICARE

## 2024-03-06 VITALS
WEIGHT: 148 LBS | OXYGEN SATURATION: 98 % | DIASTOLIC BLOOD PRESSURE: 82 MMHG | HEART RATE: 80 BPM | BODY MASS INDEX: 24.66 KG/M2 | HEIGHT: 65 IN | SYSTOLIC BLOOD PRESSURE: 160 MMHG

## 2024-03-06 DIAGNOSIS — N18.30 CONTROLLED TYPE 2 DIABETES MELLITUS WITH STAGE 3 CHRONIC KIDNEY DISEASE, WITHOUT LONG-TERM CURRENT USE OF INSULIN (HCC): ICD-10-CM

## 2024-03-06 DIAGNOSIS — E11.22 CONTROLLED TYPE 2 DIABETES MELLITUS WITH STAGE 3 CHRONIC KIDNEY DISEASE, WITHOUT LONG-TERM CURRENT USE OF INSULIN (HCC): ICD-10-CM

## 2024-03-06 DIAGNOSIS — M1A.0790 CHRONIC IDIOPATHIC GOUT INVOLVING TOE WITHOUT TOPHUS, UNSPECIFIED LATERALITY: Primary | ICD-10-CM

## 2024-03-06 DIAGNOSIS — N18.31 CHRONIC KIDNEY DISEASE, STAGE 3A (HCC): ICD-10-CM

## 2024-03-06 DIAGNOSIS — I10 WHITE COAT SYNDROME WITH HYPERTENSION: ICD-10-CM

## 2024-03-06 LAB
ANION GAP SERPL CALC-SCNC: 5 MMOL/L (ref 2–11)
BUN SERPL-MCNC: 23 MG/DL (ref 8–23)
CALCIUM SERPL-MCNC: 10.3 MG/DL (ref 8.3–10.4)
CO2 SERPL-SCNC: 27 MMOL/L (ref 21–32)
CREAT SERPL-MCNC: 0.8 MG/DL (ref 0.6–1)
GLUCOSE SERPL-MCNC: 108 MG/DL (ref 65–100)
SODIUM SERPL-SCNC: 141 MMOL/L (ref 136–146)

## 2024-03-06 PROCEDURE — G8427 DOCREV CUR MEDS BY ELIG CLIN: HCPCS | Performed by: FAMILY MEDICINE

## 2024-03-06 PROCEDURE — G8484 FLU IMMUNIZE NO ADMIN: HCPCS | Performed by: FAMILY MEDICINE

## 2024-03-06 PROCEDURE — 99214 OFFICE O/P EST MOD 30 MIN: CPT | Performed by: FAMILY MEDICINE

## 2024-03-06 PROCEDURE — 1036F TOBACCO NON-USER: CPT | Performed by: FAMILY MEDICINE

## 2024-03-06 PROCEDURE — 3078F DIAST BP <80 MM HG: CPT | Performed by: FAMILY MEDICINE

## 2024-03-06 PROCEDURE — 1123F ACP DISCUSS/DSCN MKR DOCD: CPT | Performed by: FAMILY MEDICINE

## 2024-03-06 PROCEDURE — G8399 PT W/DXA RESULTS DOCUMENT: HCPCS | Performed by: FAMILY MEDICINE

## 2024-03-06 PROCEDURE — 1090F PRES/ABSN URINE INCON ASSESS: CPT | Performed by: FAMILY MEDICINE

## 2024-03-06 PROCEDURE — 3075F SYST BP GE 130 - 139MM HG: CPT | Performed by: FAMILY MEDICINE

## 2024-03-06 PROCEDURE — G8420 CALC BMI NORM PARAMETERS: HCPCS | Performed by: FAMILY MEDICINE

## 2024-03-06 RX ORDER — HYDRALAZINE HYDROCHLORIDE 50 MG/1
50 TABLET, FILM COATED ORAL 3 TIMES DAILY
Qty: 90 TABLET | Refills: 5 | Status: SHIPPED | OUTPATIENT
Start: 2024-03-06

## 2024-03-06 RX ORDER — ALLOPURINOL 100 MG/1
150 TABLET ORAL DAILY
Qty: 60 TABLET | Refills: 5 | Status: SHIPPED | OUTPATIENT
Start: 2024-03-06

## 2024-03-06 NOTE — PROGRESS NOTES
1327 03/06/24 1342   BP: 134/70 (!) 160/82   Site: Left Upper Arm    Position: Sitting    Pulse: 80    SpO2: 98%    Weight: 67.1 kg (148 lb)    Height: 1.651 m (5' 5\")       Body mass index is 24.63 kg/m².  Physical Exam  Vitals reviewed.   Cardiovascular:      Rate and Rhythm: Normal rate and regular rhythm.      Comments: L ankle/foot with edema.  Pulmonary:      Effort: Pulmonary effort is normal.      Breath sounds: Normal breath sounds.   Skin:     General: Skin is warm and dry.   Neurological:      Mental Status: She is alert.       An electronic signature was used to authenticate this note.  Anthony Babcock DO   Elements of this note have been dictated via voice recognition software.  Text and phrases may be limited by the accuracy and autoconversion of the software.  The chart has been reviewed, but errors may still be present.

## 2024-03-07 LAB
EST. AVERAGE GLUCOSE BLD GHB EST-MCNC: 137 MG/DL
HBA1C MFR BLD: 6.4 % (ref 4.8–5.6)

## 2024-04-15 ENCOUNTER — OFFICE VISIT (OUTPATIENT)
Dept: INTERNAL MEDICINE CLINIC | Facility: CLINIC | Age: 84
End: 2024-04-15
Payer: MEDICARE

## 2024-04-15 VITALS
BODY MASS INDEX: 24.83 KG/M2 | SYSTOLIC BLOOD PRESSURE: 178 MMHG | DIASTOLIC BLOOD PRESSURE: 82 MMHG | WEIGHT: 149 LBS | OXYGEN SATURATION: 98 % | HEART RATE: 88 BPM | HEIGHT: 65 IN

## 2024-04-15 DIAGNOSIS — H53.8 BLURRY VISION, BILATERAL: Primary | ICD-10-CM

## 2024-04-15 DIAGNOSIS — I10 HTN (HYPERTENSION), BENIGN: ICD-10-CM

## 2024-04-15 PROCEDURE — 1123F ACP DISCUSS/DSCN MKR DOCD: CPT | Performed by: NURSE PRACTITIONER

## 2024-04-15 PROCEDURE — 99214 OFFICE O/P EST MOD 30 MIN: CPT | Performed by: NURSE PRACTITIONER

## 2024-04-15 PROCEDURE — 3079F DIAST BP 80-89 MM HG: CPT | Performed by: NURSE PRACTITIONER

## 2024-04-15 PROCEDURE — 1090F PRES/ABSN URINE INCON ASSESS: CPT | Performed by: NURSE PRACTITIONER

## 2024-04-15 PROCEDURE — G8420 CALC BMI NORM PARAMETERS: HCPCS | Performed by: NURSE PRACTITIONER

## 2024-04-15 PROCEDURE — 1036F TOBACCO NON-USER: CPT | Performed by: NURSE PRACTITIONER

## 2024-04-15 PROCEDURE — G8399 PT W/DXA RESULTS DOCUMENT: HCPCS | Performed by: NURSE PRACTITIONER

## 2024-04-15 PROCEDURE — 3077F SYST BP >= 140 MM HG: CPT | Performed by: NURSE PRACTITIONER

## 2024-04-15 PROCEDURE — G8427 DOCREV CUR MEDS BY ELIG CLIN: HCPCS | Performed by: NURSE PRACTITIONER

## 2024-04-15 RX ORDER — CLONIDINE 0.1 MG/24H
1 PATCH, EXTENDED RELEASE TRANSDERMAL
Qty: 4 PATCH | Refills: 1 | Status: SHIPPED | OUTPATIENT
Start: 2024-04-15 | End: 2025-04-15

## 2024-04-15 RX ORDER — BENAZEPRIL HYDROCHLORIDE 40 MG/1
40 TABLET ORAL DAILY
Qty: 30 TABLET | Refills: 3 | Status: SHIPPED | OUTPATIENT
Start: 2024-04-15

## 2024-04-15 NOTE — PROGRESS NOTES
Amanda Morris (:  1940) is a 83 y.o. female,Established patient, here for evaluation of the following chief complaint(s):  Hypertension         ASSESSMENT/PLAN:  1. Blurry vision, bilateral  -     Vascular duplex carotid bilateral; Future  2. HTN (hypertension), benign  -     Vascular duplex carotid bilateral; Future      No follow-ups on file.     BP elevated in office and at home  Blurry vision with hydralazine and olmesartan  Switch back to benazepril at her request  Stop hydralazine and try clonidine patch  Get carotid duplex  Continue coreg  F/u for bp recheck  Continue to monitor BP at home      Subjective   SUBJECTIVE/OBJECTIVE:  Patient is here for her BP. She is having blurry episodes - she went to get glasses changed but eyes were good.   When her hydralazine was doubled that got worse. On Saturday at 9 she couldn't see the signs to drive. She has seen 2 eye doctors but thinks it is from medication.  She stopped olmesartan and hydralazine and vision was clear as a bell.   She restarted them because her BP was very high.       Hypertension        Review of Systems       Objective   Physical Exam  Vitals reviewed.   Constitutional:       Appearance: Normal appearance.   HENT:      Head: Normocephalic.      Right Ear: External ear normal.      Left Ear: External ear normal.   Eyes:      Extraocular Movements: Extraocular movements intact.      Pupils: Pupils are equal, round, and reactive to light.   Cardiovascular:      Rate and Rhythm: Normal rate and regular rhythm.   Pulmonary:      Effort: Pulmonary effort is normal.      Breath sounds: No wheezing.   Musculoskeletal:      Cervical back: Neck supple.   Neurological:      General: No focal deficit present.      Mental Status: She is alert. Mental status is at baseline.   Psychiatric:         Mood and Affect: Mood normal.                  An electronic signature was used to authenticate this note.    --Carol Jaimes, APRN - CNP

## 2024-04-23 ENCOUNTER — PATIENT MESSAGE (OUTPATIENT)
Dept: INTERNAL MEDICINE CLINIC | Facility: CLINIC | Age: 84
End: 2024-04-23

## 2024-04-23 DIAGNOSIS — I10 UNCONTROLLED HYPERTENSION: ICD-10-CM

## 2024-04-24 RX ORDER — CLONIDINE 0.2 MG/24H
1 PATCH, EXTENDED RELEASE TRANSDERMAL
Qty: 14 PATCH | Refills: 5 | Status: SHIPPED | OUTPATIENT
Start: 2024-04-24 | End: 2025-04-24

## 2024-04-29 ENCOUNTER — HOSPITAL ENCOUNTER (OUTPATIENT)
Dept: ULTRASOUND IMAGING | Age: 84
Discharge: HOME | End: 2024-05-02
Payer: MEDICARE

## 2024-04-29 ENCOUNTER — OFFICE VISIT (OUTPATIENT)
Dept: INTERNAL MEDICINE CLINIC | Facility: CLINIC | Age: 84
End: 2024-04-29
Payer: MEDICARE

## 2024-04-29 VITALS
HEART RATE: 80 BPM | SYSTOLIC BLOOD PRESSURE: 180 MMHG | WEIGHT: 146 LBS | HEIGHT: 65 IN | DIASTOLIC BLOOD PRESSURE: 90 MMHG | OXYGEN SATURATION: 98 % | BODY MASS INDEX: 24.32 KG/M2

## 2024-04-29 DIAGNOSIS — H53.8 BLURRED VISION: ICD-10-CM

## 2024-04-29 DIAGNOSIS — I10 HTN (HYPERTENSION), BENIGN: Primary | ICD-10-CM

## 2024-04-29 DIAGNOSIS — I10 HTN (HYPERTENSION), BENIGN: ICD-10-CM

## 2024-04-29 DIAGNOSIS — H53.8 BLURRY VISION, BILATERAL: ICD-10-CM

## 2024-04-29 PROCEDURE — G8427 DOCREV CUR MEDS BY ELIG CLIN: HCPCS | Performed by: FAMILY MEDICINE

## 2024-04-29 PROCEDURE — 1036F TOBACCO NON-USER: CPT | Performed by: FAMILY MEDICINE

## 2024-04-29 PROCEDURE — 93880 EXTRACRANIAL BILAT STUDY: CPT

## 2024-04-29 PROCEDURE — 3077F SYST BP >= 140 MM HG: CPT | Performed by: FAMILY MEDICINE

## 2024-04-29 PROCEDURE — 93880 EXTRACRANIAL BILAT STUDY: CPT | Performed by: RADIOLOGY

## 2024-04-29 PROCEDURE — 3079F DIAST BP 80-89 MM HG: CPT | Performed by: FAMILY MEDICINE

## 2024-04-29 PROCEDURE — 99214 OFFICE O/P EST MOD 30 MIN: CPT | Performed by: FAMILY MEDICINE

## 2024-04-29 PROCEDURE — G8399 PT W/DXA RESULTS DOCUMENT: HCPCS | Performed by: FAMILY MEDICINE

## 2024-04-29 PROCEDURE — 1090F PRES/ABSN URINE INCON ASSESS: CPT | Performed by: FAMILY MEDICINE

## 2024-04-29 PROCEDURE — 1123F ACP DISCUSS/DSCN MKR DOCD: CPT | Performed by: FAMILY MEDICINE

## 2024-04-29 PROCEDURE — G8420 CALC BMI NORM PARAMETERS: HCPCS | Performed by: FAMILY MEDICINE

## 2024-04-29 RX ORDER — CARVEDILOL 12.5 MG/1
12.5 TABLET ORAL 2 TIMES DAILY WITH MEALS
Qty: 180 TABLET | Refills: 1 | Status: SHIPPED | OUTPATIENT
Start: 2024-04-29

## 2024-04-29 RX ORDER — CLONIDINE HYDROCHLORIDE 0.1 MG/1
0.1 TABLET ORAL 2 TIMES DAILY
Qty: 60 TABLET | Refills: 3 | Status: SHIPPED | OUTPATIENT
Start: 2024-04-29

## 2024-04-29 RX ORDER — CLONIDINE 0.1 MG/24H
1 PATCH, EXTENDED RELEASE TRANSDERMAL WEEKLY
COMMUNITY

## 2024-04-29 NOTE — PROGRESS NOTES
SpO2: 98%   Weight: 66.2 kg (146 lb)   Height: 1.651 m (5' 5\")      Body mass index is 24.3 kg/m².  Physical Exam  Vitals reviewed.   Cardiovascular:      Rate and Rhythm: Normal rate and regular rhythm.   Pulmonary:      Effort: Pulmonary effort is normal.      Breath sounds: Normal breath sounds.   Skin:     General: Skin is warm and dry.   Neurological:      General: No focal deficit present.      Mental Status: She is alert.       An electronic signature was used to authenticate this note.  Anthony Babcock DO   Elements of this note have been dictated via voice recognition software.  Text and phrases may be limited by the accuracy and autoconversion of the software.  The chart has been reviewed, but errors may still be present.

## 2024-05-06 ENCOUNTER — OFFICE VISIT (OUTPATIENT)
Dept: INTERNAL MEDICINE CLINIC | Facility: CLINIC | Age: 84
End: 2024-05-06
Payer: MEDICARE

## 2024-05-06 VITALS
WEIGHT: 145 LBS | DIASTOLIC BLOOD PRESSURE: 80 MMHG | OXYGEN SATURATION: 97 % | HEIGHT: 65 IN | HEART RATE: 80 BPM | BODY MASS INDEX: 24.16 KG/M2 | SYSTOLIC BLOOD PRESSURE: 172 MMHG

## 2024-05-06 DIAGNOSIS — R60.0 BILATERAL LEG EDEMA: ICD-10-CM

## 2024-05-06 DIAGNOSIS — F41.9 ANXIETY: ICD-10-CM

## 2024-05-06 DIAGNOSIS — R09.89 LABILE HYPERTENSION: Primary | ICD-10-CM

## 2024-05-06 PROCEDURE — G8420 CALC BMI NORM PARAMETERS: HCPCS | Performed by: FAMILY MEDICINE

## 2024-05-06 PROCEDURE — 99214 OFFICE O/P EST MOD 30 MIN: CPT | Performed by: FAMILY MEDICINE

## 2024-05-06 PROCEDURE — G8427 DOCREV CUR MEDS BY ELIG CLIN: HCPCS | Performed by: FAMILY MEDICINE

## 2024-05-06 PROCEDURE — 1090F PRES/ABSN URINE INCON ASSESS: CPT | Performed by: FAMILY MEDICINE

## 2024-05-06 PROCEDURE — 1036F TOBACCO NON-USER: CPT | Performed by: FAMILY MEDICINE

## 2024-05-06 PROCEDURE — G8399 PT W/DXA RESULTS DOCUMENT: HCPCS | Performed by: FAMILY MEDICINE

## 2024-05-06 PROCEDURE — 3079F DIAST BP 80-89 MM HG: CPT | Performed by: FAMILY MEDICINE

## 2024-05-06 PROCEDURE — 1123F ACP DISCUSS/DSCN MKR DOCD: CPT | Performed by: FAMILY MEDICINE

## 2024-05-06 PROCEDURE — 3077F SYST BP >= 140 MM HG: CPT | Performed by: FAMILY MEDICINE

## 2024-05-06 NOTE — PROGRESS NOTES
Anthony Babcock DO  Diplomate of the American Board of Family Medicine  [facility]      Amanda Morris (: 1940) is a 83 y.o. female, here for evaluation of the following chief complaint(s):  Hypertension       ASSESSMENT/PLAN:  1. Labile hypertension  -     BS - Care Coordination/Social Work - [facility] Care Management  2. Bilateral leg edema  3. Anxiety     Will continue with the carvedilol/benazepril/clonidine.  Will go ahead and switch clonidine to first dose around 6 AM and second dose early to mid afternoon to see if this helps with afternoon blood pressures.  Will also go ahead and enroll her in [facility] to monitor more closely.  Encouraged continued home readings.  Continue with Lasix on a as needed basis for lower extremity edema.  Will continue with diazepam as needed for severe anxiety.  States that she really only needs this on an intermittent basis.        SUBJECTIVE/OBJECTIVE:  HPI:  Patient can today for follow-up on her blood pressure.  Continues to have some labile hypertension.  States usually in the mornings, her readings are typically in the 130s.  However by the mid afternoon to late afternoon, they do increase up sometimes as high as the 160s to 170s.  She does admit to having some anxiety.  Denies any chest pain/palpitations.  No neurologic symptoms.  Does have some intermittent leg edema which she takes Lasix on a as needed basis and gets good relief.  Does admit to having some anxiety at times, which she thinks contributes to her elevated pressures.  ROS negative except as noted above today.    Social History     Tobacco Use    Smoking status: Former     Current packs/day: 0.00     Types: Cigarettes     Quit date: 1983     Years since quittin.3    Smokeless tobacco: Never   Vaping Use    Vaping Use: Never used   Substance Use Topics    Alcohol use: Yes     Alcohol/week: 0.0 standard drinks of alcohol    Drug use: No     Vitals:    24 1609

## 2024-05-07 ENCOUNTER — CARE COORDINATION (OUTPATIENT)
Dept: CARE COORDINATION | Facility: CLINIC | Age: 84
End: 2024-05-07

## 2024-05-07 ENCOUNTER — CARE COORDINATION (OUTPATIENT)
Facility: CLINIC | Age: 84
End: 2024-05-07

## 2024-05-07 DIAGNOSIS — R09.89 LABILE HYPERTENSION: Primary | ICD-10-CM

## 2024-05-07 NOTE — PROGRESS NOTES
Remote Patient Monitoring Treatment Plan    Received request from [facility]/CTRadha Lang RN   to order remote patient monitoring for in home monitoring of HTN; Condition managed by PCP.  and order completed.     Patient will be monitoring blood pressure . Patient will also be doing an afternoon reading - between 2-3 pm - per PCP instructions. Afternoon BP has been running high for this patient.      Patient will engage in Remote Patient Monitoring each day to develop the skills necessary for self management.       RPM Care Team Responsibilities:   Alerts will be reviewed daily and addressed within 2-4 hours during operational hours (Monday -Friday 9 am-4 pm)  Alert response and intervention documented in patient medical record  Alert response escalated to PCP per protocol and documented in patient medical record  Patient monitored over approximately  days  Discharge from program based on self-management readiness    See care coordination encounters for additional details.

## 2024-05-07 NOTE — CARE COORDINATION
Remote Patient Kit Ordering Note      Date/Time:  5/7/2024 3:24 PM      CCSS placed phone call to patient/family today to notify of RPM kit order; patient/family was unavailable; Left HIPAA compliant voicemail regarding RPM kit.    [] [facility]  [] Patient will receive package over the next 1-3 business days. Someone 21 years or older must be present to sign for UPS delivery.  [] HRS will contact patient within 24 hours, an HRS  will call the patient directly: If the patient does not answer, HRS will follow up with the clinical team notifying them about the unsuccessful attempt to contact the patient. HRS will make three call attempts to the patient.Provide patient with [facility] Virtual install number is: [phone].  [x] ACM will contact patient once equipment is active to welcome them to the program.                                                         [] Hours of RPM monitoring - Monday-Friday [id number]; encourage patient to get vitals entered by Noon each day to have the alert addressed same day.  [x][facility] mailed RPM Patient flyer to patient.                      ACM made aware the RPM kit has been ordered.

## 2024-05-07 NOTE — CARE COORDINATION
Ambulatory Care Coordination Note     5/7/2024 1:55 PM     Outreach per Direct PCP Referral - patient needs RPM including additional afternoon BP reading.  Unable to reach  Left a VM  Will repeat outreach pending return call     ACM: Radha Shah RN    PCP/Specialist follow up:   Future Appointments         Provider Specialty Dept Phone    6/5/2024 9:15 AM Anthony Babcock DO Internal Medicine [phone]    8/13/2024 2:00 PM Florian Gonzalez MD Family Medicine [phone]          
6/5/2024 9:15 AM Anthony Babcock DO Internal Medicine [phone]    8/13/2024 2:00 PM Florian Gonzalez MD [facility] Medicine [phone]

## 2024-05-14 ENCOUNTER — CARE COORDINATION (OUTPATIENT)
Dept: CARE COORDINATION | Facility: CLINIC | Age: 84
End: 2024-05-14

## 2024-05-14 ENCOUNTER — TELEPHONE (OUTPATIENT)
Age: 84
End: 2024-05-14

## 2024-05-14 NOTE — TELEPHONE ENCOUNTER
She loved  but her PCP pitched a fit about her having  follow her blood pressure.In December all BP meds were stopped and she does not know why.PCP has changed BP meds so many times.She said she just needs to come in.I told pt.she was last seen 2024 can not advise over the phone will need an appt.    I made appt.5/23/24 pt.v/u.

## 2024-05-14 NOTE — CARE COORDINATION
Patient will see cardiology next week - 5/23  Hold on RPM for now.    Patient will take BP at home as she has been doing  Keep a log and take this with her to cardiology    PLAN:  ACM will call Friday to follow up on BP numbers for the remainder of the week.

## 2024-05-14 NOTE — CARE COORDINATION
Ambulatory Care Coordination Note     5/14/2024 4:51 PM     Care Coordination call with PCP   - increase clonidine to 0.2 mg twice daily   - keep track of BP each morning, keep a record    PLAN:  spoke with patient re: above order  ACM to call patient Friday to update BP numbers  Patient to call ACM with BP number that is \"too high\"     ACM: Radha Shah RN       PCP/Specialist follow up:   Future Appointments         Provider Specialty Dept Phone    5/16/2024 10:00 AM Carol Jaimes APRN - CNP Internal Medicine [phone]    5/23/2024 9:15 AM Harshil Yeh MD Cardiology [phone]    6/5/2024 9:15 AM Anthony Babcock,  Internal Medicine [phone]    8/13/2024 2:00 PM Florian Gonzalez MD Family Medicine [phone]

## 2024-05-14 NOTE — TELEPHONE ENCOUNTER
Made an appt for 06/18 but is having high BP issues Anything she should do until then? Please call

## 2024-05-14 NOTE — CARE COORDINATION
Ambulatory Care Coordination Note     5/14/2024 10:01 AM     Outreach for High Risk Care Management   - made patient aware that RPM kit is out for delivery today.    Care Summary Note:   Patient very concerned, BP today 175/93, over the weekend it was even higher with the systolic close to 200.   - patient has called cardiology this morning, appt scheduled for 6/18 but was told a triage nurse would call her today in consideration of an earlier time/day.  Reports medication compliance  Taking BP daily, realizes that her anxiety over the high numbers exacerbates the numbers.    PCP out today.  Will forward note to cardiology triage nurses.     ACM: Radha Shah RN     Challenges to be reviewed by the provider   Additional needs identified to be addressed with provider Yes  Consideration for a work in appointment re: ongoing high BP               Method of communication with provider: chart routing.    Offered patient enrollment in the Remote Patient Monitoring (RPM) program for in-home monitoring: Yes, patient enrolled; current status is awaiting kit.    PCP/Specialist follow up:   Future Appointments         Provider Specialty Dept Phone    6/5/2024 9:15 AM Anthony Babcock,  Internal Medicine [phone]    6/18/2024 9:45 AM Harshil Yeh MD Cardiology [phone]    8/13/2024 2:00 PM Florian Gonzalez MD Family Medicine [phone]

## 2024-05-15 ENCOUNTER — CARE COORDINATION (OUTPATIENT)
Dept: CARE COORDINATION | Facility: CLINIC | Age: 84
End: 2024-05-15

## 2024-05-15 NOTE — CARE COORDINATION
Ambulatory Care Coordination Note     5/15/2024 10:26 AM     Incoming call from patient, left  for this ACM  Care Summary Note:   Reports she took 2 clonidine this morning  Also reports she felt a little \"drunk\" from the second pill.  BP:  111/71    PLAN:  route chart to PCP  Continue to monitor     ACM: Radha Shah RN       Method of communication with provider: chart routing.    PCP/Specialist follow up:   Future Appointments         Provider Specialty Dept Phone    5/23/2024 9:15 AM Harshil Yeh MD Cardiology [phone]    6/5/2024 9:15 AM Anthony Babcock,  Internal Medicine [phone]    8/13/2024 2:00 PM Florian Gonzalez MD Family Medicine [phone]

## 2024-05-17 ENCOUNTER — CARE COORDINATION (OUTPATIENT)
Dept: CARE COORDINATION | Facility: CLINIC | Age: 84
End: 2024-05-17

## 2024-05-17 DIAGNOSIS — I10 HTN (HYPERTENSION), BENIGN: ICD-10-CM

## 2024-05-17 RX ORDER — CLONIDINE HYDROCHLORIDE 0.1 MG/1
0.2 TABLET ORAL 2 TIMES DAILY
Qty: 120 TABLET | Refills: 5 | Status: SHIPPED | OUTPATIENT
Start: 2024-05-17

## 2024-05-17 NOTE — CARE COORDINATION
Ambulatory Care Coordination Note  5/17/2024    Patient Current Location:    Home: [address] [place] [address] Rd  [place] [address]     Outreach for High Risk Case Management  Following for HTN  Patient was to take 0.2 mg twice daily.  Yesterday (5/16) BP was:  111/75 - with two tabs; felt light-headed, slept all morning  This morning has taken 1 tab (and will take 2 tabs tonight)  BP:  138/75 this morning    Will keep track of BP over the weekend  ACM to check in with patient on Monday 5/20    Challenges to be reviewed by the provider   Additional needs identified to be addressed with provider: Yes    Patient will need the sig on the prescription changed since she is doubling the dose, will not have any left after today.               Method of communication with provider: chart routing.    ACM: Radha Shah RN    Offered patient enrollment in the Remote Patient Monitoring (RPM) program for in-home monitoring: Currently on hold for this program.    Future Appointments   Date Time Provider Department Center   5/23/2024  9:15 AM Harshil Yeh MD [facility]   6/5/2024  9:15 AM Anthony Babcock DO [facility] AM[facility]   8/13/2024  2:00 PM Florian Gonzalez MD [facility]

## 2024-05-20 ENCOUNTER — CARE COORDINATION (OUTPATIENT)
Dept: CARE COORDINATION | Facility: CLINIC | Age: 84
End: 2024-05-20

## 2024-05-20 NOTE — CARE COORDINATION
Ambulatory Care Coordination Note  5/20/2024    Patient Current Location:  Home: [address] [place] [address] Rd  [place] [address]     Outreach for High Risk Case Management  Blood Pressure Check  Sat - 122/76; 139/75  Sun - 156/83  Mon - 138/66  Experiencing some hair loss - patient not sure if this is an associated side effect.  Medications:  taking -   0.1 mg clonidine in a.m.; 0.2 mg clonidine in p.m.   Carvedilol 12.5 mg twice daily    PLAN - call again on Wednesday.    Challenges to be reviewed by the provider   Additional needs identified to be addressed with provider: No  none, see Blood Pressure readings in note.               Method of communication with provider: chart routing.    ACM: Radha Shah, RN    Offered patient enrollment in the Remote Patient Monitoring (RPM) program for in-home monitoring: Yes, patient enrolled; current status is preactivated- currently on hold while patient tries new medication regimen .      Future Appointments   Date Time Provider Department Center   6/5/2024  9:15 AM Anthony Babcock DO [facility]   8/13/2024  2:00 PM Florian Gonzalez MD [facility] AMB

## 2024-05-22 ENCOUNTER — CARE COORDINATION (OUTPATIENT)
Dept: CARE COORDINATION | Facility: CLINIC | Age: 84
End: 2024-05-22

## 2024-05-22 NOTE — CARE COORDINATION
Ambulatory Care Coordination Note  5/22/2024    Outreach with patient for High Risk Case Management  Incoming call from patient  Blood Pressure today - 134/71  Experienced a low the other evening 111/58   - encourage patient to stay hydrated (she says she is not good about drinking water, drinks a lot of coffee)    PLAN for check in call on Friday 5/24    Challenges to be reviewed by the provider   Additional needs identified to be addressed with provider: No  none             Method of communication with provider: chart routing.    ACM: Radha Shah RN    Offered patient enrollment in the Remote Patient Monitoring (RPM) program for in-home monitoring: Yes, patient enrolled; current status is preactivatedon pause for now, may not need this service .        Future Appointments   Date Time Provider Department Center   6/5/2024  9:15 AM Anthony Babcock DO [facility]   8/13/2024  2:00 PM Florian Gonzalez MD [facility] AM[facility]

## 2024-05-24 ENCOUNTER — CARE COORDINATION (OUTPATIENT)
Dept: CARE COORDINATION | Facility: CLINIC | Age: 84
End: 2024-05-24

## 2024-05-24 ENCOUNTER — TELEPHONE (OUTPATIENT)
Age: 84
End: 2024-05-24

## 2024-05-24 NOTE — CARE COORDINATION
Ambulatory Care Coordination Note  5/24/2024    Incoming call from patient last evening. 5/23  Blood pressure  153/85  180/96  Not feeling well with morning clonidine.  Ideally would like cardiologist to manage BP, but prefers to change cardiology providers.  ACM will request on patient's behalf.  Discussed some adjunct support for blood pressure   - chair yoga   - walking   - diet choices (overall diet seems like it's on track)    Outreach this morning 5/24  Blood pressure:  136/74    Challenges to be reviewed by the provider   Additional needs identified to be addressed with provider: No  none               Method of communication with provider: face to face.    ACM: Radha Shah, URVASHI    Future Appointments   Date Time Provider Department Center   6/5/2024  9:15 AM Anthony Babcock DO [facility]   8/13/2024  2:00 PM Florian Gonzalez MD [facility] AMB

## 2024-05-29 ENCOUNTER — CARE COORDINATION (OUTPATIENT)
Facility: CLINIC | Age: 84
End: 2024-05-29

## 2024-05-29 ENCOUNTER — CARE COORDINATION (OUTPATIENT)
Dept: CARE COORDINATION | Facility: CLINIC | Age: 84
End: 2024-05-29

## 2024-05-29 ENCOUNTER — OFFICE VISIT (OUTPATIENT)
Age: 84
End: 2024-05-29
Payer: MEDICARE

## 2024-05-29 VITALS
SYSTOLIC BLOOD PRESSURE: 184 MMHG | BODY MASS INDEX: 24.56 KG/M2 | HEIGHT: 65 IN | DIASTOLIC BLOOD PRESSURE: 80 MMHG | HEART RATE: 66 BPM | WEIGHT: 147.4 LBS

## 2024-05-29 DIAGNOSIS — E78.2 MIXED HYPERLIPIDEMIA: ICD-10-CM

## 2024-05-29 DIAGNOSIS — R00.2 PALPITATIONS: Primary | ICD-10-CM

## 2024-05-29 DIAGNOSIS — I10 PRIMARY HYPERTENSION: ICD-10-CM

## 2024-05-29 DIAGNOSIS — M1A.0790 CHRONIC IDIOPATHIC GOUT INVOLVING TOE WITHOUT TOPHUS, UNSPECIFIED LATERALITY: ICD-10-CM

## 2024-05-29 DIAGNOSIS — I10 WHITE COAT SYNDROME WITH HYPERTENSION: ICD-10-CM

## 2024-05-29 PROCEDURE — G8427 DOCREV CUR MEDS BY ELIG CLIN: HCPCS | Performed by: INTERNAL MEDICINE

## 2024-05-29 PROCEDURE — 1090F PRES/ABSN URINE INCON ASSESS: CPT | Performed by: INTERNAL MEDICINE

## 2024-05-29 PROCEDURE — 93000 ELECTROCARDIOGRAM COMPLETE: CPT | Performed by: INTERNAL MEDICINE

## 2024-05-29 PROCEDURE — 1123F ACP DISCUSS/DSCN MKR DOCD: CPT | Performed by: INTERNAL MEDICINE

## 2024-05-29 PROCEDURE — G8399 PT W/DXA RESULTS DOCUMENT: HCPCS | Performed by: INTERNAL MEDICINE

## 2024-05-29 PROCEDURE — G8420 CALC BMI NORM PARAMETERS: HCPCS | Performed by: INTERNAL MEDICINE

## 2024-05-29 PROCEDURE — 3079F DIAST BP 80-89 MM HG: CPT | Performed by: INTERNAL MEDICINE

## 2024-05-29 PROCEDURE — 3077F SYST BP >= 140 MM HG: CPT | Performed by: INTERNAL MEDICINE

## 2024-05-29 PROCEDURE — 1036F TOBACCO NON-USER: CPT | Performed by: INTERNAL MEDICINE

## 2024-05-29 PROCEDURE — 99204 OFFICE O/P NEW MOD 45 MIN: CPT | Performed by: INTERNAL MEDICINE

## 2024-05-29 RX ORDER — CARVEDILOL 25 MG/1
25 TABLET ORAL 2 TIMES DAILY
Qty: 60 TABLET | Refills: 11 | Status: SHIPPED | OUTPATIENT
Start: 2024-05-29

## 2024-05-29 ASSESSMENT — ENCOUNTER SYMPTOMS
BACK PAIN: 0
CHEST TIGHTNESS: 0
ABDOMINAL PAIN: 0
RESPIRATORY NEGATIVE: 1
PHOTOPHOBIA: 0
EYE PAIN: 0
SHORTNESS OF BREATH: 0
GASTROINTESTINAL NEGATIVE: 1
EYES NEGATIVE: 1
ALLERGIC/IMMUNOLOGIC NEGATIVE: 1

## 2024-05-29 NOTE — CARE COORDINATION
Patient Amanda Valdezvanagh  05/29/24     Care Coordination  placed call to patient to arrange RPM kit  through [employer]. Left HIPAA Compliant Message     provided return and how to pack equipment in original packing via the patients voicemail if available and provided call back number should patient have questions.    Patient made aware [employer] will  equipment in 2-4 days.

## 2024-05-29 NOTE — CARE COORDINATION
Ambulatory Care Coordination Note  5/29/2024    Patient Current Location:    Home: [address] [place] [address] Rd  [place] [address]     Outreach for High Risk Case Management  Patient has seen cardiology re: HTN.   - carvedilol 25 mg twice daily   - stop clonidine   - continue benazepril   - keep a log/return in a month   - ECHO ordered    PLAN:  follow for BP with weekly calls  Will facilitate f/u with cardiology (no 1 month f/u is currently scheduled).      Challenges to be reviewed by the provider   Additional needs identified to be addressed with provider: No  none               Method of communication with provider: none.    ACM: Radha Shah, URVASHI    Offered patient enrollment in the Remote Patient Monitoring (RPM) program for in-home monitoring: Declined this service    Lab Results       None                 Goals Addressed                   This Visit's Progress     Blood Pressure < 140/90               Future Appointments   Date Time Provider Department Center   6/5/2024  9:15 AM nAthony Babcock DO [facility] AMB   7/19/2024  3:30 PM [facility] ECHO 60 [facility] AMB   8/13/2024  2:00 PM Florian Gonzalez MD [facility] AMB   9/12/2024  3:45 PM Bert Fernandez MD [facility] AMB

## 2024-05-29 NOTE — PROGRESS NOTES
R00.2 EKG 12 lead      2. Primary hypertension  I10 Echo (TTE) complete (PRN contrast/bubble/strain/3D)      3. White coat syndrome with hypertension  I10       4. Mixed hyperlipidemia  E78.2       5. Chronic idiopathic gout involving toe without tophus, unspecified laterality  M1A.0790           IMPRESSION:  1) HTN - double coreg to 25 mg BID, stop clonidine and continue benazapril at 40 mg daily. Keep BP log return to clinic in one month. Will check an echo to r/o hypertensive heart disease  2) Palpitations - controlled on BB  3) Gout/CKD - seems resolved but limits choices for BP meds    ORDERS  Orders Placed This Encounter    EKG 12 lead     Order Specific Question:   Reason for Exam?     Answer:   Hypertension    carvedilol (COREG) 25 MG tablet     Sig: Take 1 tablet by mouth 2 times daily     Dispense:  60 tablet     Refill:  11       Follow up in 1 month after testing.      Thank you for allowing me to participate in this patient's care.  Please call or contact me if there are any questions or concerns regarding the above.      Bert Fernandez MD  05/29/24  11:07 AM

## 2024-06-03 ENCOUNTER — CARE COORDINATION (OUTPATIENT)
Dept: CARE COORDINATION | Facility: CLINIC | Age: 84
End: 2024-06-03

## 2024-06-03 DIAGNOSIS — I10 HTN (HYPERTENSION), BENIGN: Primary | ICD-10-CM

## 2024-06-03 NOTE — CARE COORDINATION
Ambulatory Care Coordination Note  6/3/2024    Patient Current Location:    Home: [address] [place] [address] Rd  [place] [address]     Outreach for High Risk Case Management  Patient saw Cardiology 5/29   - Clonidine discontinued (med list doesn't reflect this, patient reported)   - Carvedilol dose 25 mg twice daily    Blood Pressures since Friday 5/31  169/80  191/100  169/80  170/99    Patient reports feeling very down and sad re: her dog being put down on 5/30/2024    PLAN:  route note to cardiology triage clinical staff  Challenges to be reviewed by the provider   Additional needs identified to be addressed with provider: Yes  Please let Dr. Fernandez know about BP list above - since medication change.               Method of communication with provider: chart routing.    ACM: Radha Shah, URVASHI      Future Appointments   Date Time Provider Department Center   6/17/2024  9:45 AM Anthony Babcock DO [facility] AM[facility]   7/19/2024  3:30 PM [facility] ECHO 60 [facility] AMB   8/13/2024  2:00 PM Florian Gonzalez MD [facility] AMB   9/12/2024  3:45 PM Bert Fernandez MD [facility]

## 2024-06-05 ENCOUNTER — CARE COORDINATION (OUTPATIENT)
Dept: CARE COORDINATION | Facility: CLINIC | Age: 84
End: 2024-06-05

## 2024-06-05 NOTE — CARE COORDINATION
Ambulatory Care Coordination Note  6/5/2024    Outreach for High Risk Care Management  Multiple calls with patient since Sunday 6/2/2024  Blood pressures had been running very high - opted to get a new BP cuff  Today with /83.    PLAN: f/u call in next 2 days.    Method of communication with provider: none.    ACM: Radha Shah, RN    Lab Results       None                 Goals Addressed    None         Future Appointments   Date Time Provider Department Center   6/17/2024  9:45 AM Anthony Babcock DO [facility] AM[facility]   6/19/2024  2:30 PM [facility] ECHO 35 [facility] AMB   7/31/2024 11:30 AM Bert Fernandez MD [facility] AMB   8/13/2024  2:00 PM Florian Gonzalez MD [facility] AMB

## 2024-06-07 ENCOUNTER — CARE COORDINATION (OUTPATIENT)
Dept: CARE COORDINATION | Facility: CLINIC | Age: 84
End: 2024-06-07

## 2024-06-07 RX ORDER — SPIRONOLACTONE 25 MG/1
25 TABLET ORAL DAILY
Qty: 90 TABLET | Refills: 1 | Status: SHIPPED | OUTPATIENT
Start: 2024-06-07

## 2024-06-07 RX ORDER — SPIRONOLACTONE 25 MG/1
25 TABLET ORAL DAILY
Qty: 90 TABLET | Refills: 1 | Status: CANCELLED | OUTPATIENT
Start: 2024-06-07

## 2024-06-07 NOTE — CARE COORDINATION
Ambulatory Care Coordination Note  6/7/2024    Patient Current Location:    Home: [address] [place] [address] Rd  [place] [address]     Outreach for High Risk Case Management - spoke with patient.  BP readings:  6/6 = 147/78  6/7 = 146/77  Reported to Cardiology with subsequent directions - relayed the following to patient this morning:  Start Spironolactone 25 mg daily  BMP in one week    Of note - the Spironolactone is not on the med list.  Sent message to cardiology to confirm it has been e-scribed to the pharmacy      Challenges to be reviewed by the provider   Additional needs identified to be addressed with provider: No  Please confirm that spironolactone has been sent to pharmacy               Method of communication with provider: chart routing.    ACM: Radha Shah, RN      Lab Results       None                 Goals Addressed    None         Future Appointments   Date Time Provider Department Center   6/17/2024  9:45 AM Anthony Babcock DO [facility] AMB   6/19/2024  2:30 PM [facility] ECHO 35 [facility] AMB   7/31/2024 11:30 AM Bert Fernandez MD [facility] AMB   8/13/2024  2:00 PM Florian Gonzalez MD [facility] AMB

## 2024-06-07 NOTE — TELEPHONE ENCOUNTER
Radha Shah R.N. Case Management sent a message with patients BP readings and Dr. Fernandez prescribed Spironolactone 25 mg QD. I spoke with patient today he also ordered a BMP to be done in 1 week. She will pick the order up today and will start her medicine ASAP.

## 2024-06-10 ENCOUNTER — TELEPHONE (OUTPATIENT)
Age: 84
End: 2024-06-10

## 2024-06-10 NOTE — TELEPHONE ENCOUNTER
----- Message from Radha Shah, RN sent at 6/10/2024  8:58 AM EDT -----  Per Dr. Fernandez's order - patient is supposed to get a BMP at the end of the week.  She [place] close to [facility]    She is asking can she please get the lab work done there?    Sorry to bother - just not sure of the answer myself.      I mailed lab order to pt.home.Pt.notified and v/u.

## 2024-06-13 ENCOUNTER — TELEPHONE (OUTPATIENT)
Age: 84
End: 2024-06-13

## 2024-06-13 DIAGNOSIS — I10 HYPERTENSION: Primary | ICD-10-CM

## 2024-06-13 NOTE — TELEPHONE ENCOUNTER
Pt wants to have lab work done at [facility].  Please update orders and need to fax to [phone]

## 2024-06-14 ENCOUNTER — CARE COORDINATION (OUTPATIENT)
Dept: CARE COORDINATION | Facility: CLINIC | Age: 84
End: 2024-06-14

## 2024-06-14 DIAGNOSIS — I10 HYPERTENSION: ICD-10-CM

## 2024-06-14 NOTE — CARE COORDINATION
Ambulatory Care Coordination Note     6/14/2024 9:33 AM     Multiple calls with patient this week   - Monday 6/10 started new med:  spironolactone 25 mg daily   - BMP ordered, patient will have blood draw on Monday (has lab order to take to [facility])    Blood pressures this week:  Saturday 6/8 = 128/68  Sunday 6/9 = 138/76  Monday 6/10 = 156/80  Tuesday 6/11 = 136/70  Wednesday 6/12 = 140/74  Thursday 6//13 = 129/71  Friday 6/13 = 132/75     ACM: Radha Shah, RN     Method of communication with provider: chart routing.      PCP/Specialist follow up:   Future Appointments         Provider Specialty Dept Phone    6/19/2024 2:30 PM [facility] ECHO 35 Cardiology [phone]    7/31/2024 11:30 AM Bert Fernandez MD Cardiology [phone]    8/13/2024 2:00 PM Florian Gonzalez MD Family Medicine [phone]

## 2024-06-21 ENCOUNTER — CARE COORDINATION (OUTPATIENT)
Dept: CARE COORDINATION | Facility: CLINIC | Age: 84
End: 2024-06-21

## 2024-06-21 NOTE — CARE COORDINATION
Ambulatory Care Coordination Note     6/21/2024 9:49 AM     Incoming calls this week, patient reporting blood pressures.  Care Summary Note:   M - 118/61  T - 132/78  Th - 134/74  F - 129/74    Patient feeling well.  Staying active    ACM: Radha Shah RN     Challenges to be reviewed by the provider   Additional needs identified to be addressed with provider No  none               Method of communication with provider: chart routing.    Offered patient enrollment in the Remote Patient Monitoring (RPM) program for in-home monitoring: Patient has declined this service      Medications Reviewed:   Patient denies any changes with medications and reports taking all medications as prescribed.    Advance Care Planning:   Not reviewed during this call    PCP/Specialist follow up:   Future Appointments         Provider Specialty Dept Phone    7/18/2024 10:30 AM [facility] ECHO 36 Cardiology [phone]    7/31/2024 11:30 AM Bert Fernandez MD Cardiology [phone]    8/13/2024 2:00 PM Florian Gonzalez MD Family Medicine [phone]

## 2024-07-05 ENCOUNTER — CARE COORDINATION (OUTPATIENT)
Dept: CARE COORDINATION | Facility: CLINIC | Age: 84
End: 2024-07-05

## 2024-07-05 NOTE — CARE COORDINATION
Ambulatory Care Coordination Note     7/5/2024 10:47 AM     Patient Current Location:    Home: [address] [place] [address]  [place] [address]     Care Summary Note:   Outreach for high risk case management - BP check  7/2/2024 - 135/73  7/3/2024 - 129/78  7/4/2024 - 127/71  7/5/2024 - 128/72    Patient to follow up with echo and cardiologist later in July.  Transitions to different PCP in August.     ACM: Radha Shah RN    Method of communication with provider: chart routing.    Offered patient enrollment in the Remote Patient Monitoring (RPM) program for in-home monitoring: declined    PCP/Specialist follow up:   Future Appointments         Provider Specialty Dept Phone    7/18/2024 10:30 AM [facility] ECHO 36 Cardiology [phone]    7/31/2024 11:30 AM Bert Fernandez MD Cardiology [phone]    8/13/2024 2:00 PM Florian Gonzalez MD Family Medicine [phone]            Follow Up:   Plan for follow up subsequent to cardiology appointment  Patient to call with questions or concerns whenever they occur.

## 2024-07-09 DIAGNOSIS — E03.9 HYPOTHYROIDISM, ADULT: ICD-10-CM

## 2024-07-10 RX ORDER — LEVOTHYROXINE SODIUM 0.07 MG/1
75 TABLET ORAL DAILY
Qty: 90 TABLET | Refills: 1 | Status: SHIPPED | OUTPATIENT
Start: 2024-07-10

## 2024-07-19 ENCOUNTER — TELEPHONE (OUTPATIENT)
Age: 84
End: 2024-07-19

## 2024-07-19 ENCOUNTER — CARE COORDINATION (OUTPATIENT)
Dept: CARE COORDINATION | Facility: CLINIC | Age: 84
End: 2024-07-19

## 2024-07-19 NOTE — TELEPHONE ENCOUNTER
Ambulatory Care Coordination Note     7/19/2024 8:28 AM     Care Summary Note:   Incoming call from patient  Reports that she missed her appointment for an echocardiogram yesterday.  This was d/t her 's hospital admission via the [facility] ED.  Patient was supposed to have the echo prior to her appointment with Dr. Fernandez on July 31.  Patient very concerned about being able to have the echo and then seeing Dr. Fernandez as scheduled.
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
## 2024-07-29 DIAGNOSIS — E78.2 MIXED HYPERLIPIDEMIA: ICD-10-CM

## 2024-07-29 DIAGNOSIS — M85.89 OSTEOPENIA OF MULTIPLE SITES: ICD-10-CM

## 2024-07-30 RX ORDER — ROSUVASTATIN CALCIUM 5 MG/1
5 TABLET, COATED ORAL DAILY
Qty: 90 TABLET | Refills: 1 | Status: SHIPPED | OUTPATIENT
Start: 2024-07-30

## 2024-07-30 RX ORDER — RALOXIFENE HYDROCHLORIDE 60 MG/1
60 TABLET, FILM COATED ORAL DAILY
Qty: 90 TABLET | Refills: 1 | Status: SHIPPED | OUTPATIENT
Start: 2024-07-30

## 2024-07-31 ENCOUNTER — OFFICE VISIT (OUTPATIENT)
Age: 84
End: 2024-07-31
Payer: MEDICARE

## 2024-07-31 VITALS
HEART RATE: 76 BPM | DIASTOLIC BLOOD PRESSURE: 70 MMHG | BODY MASS INDEX: 24.36 KG/M2 | SYSTOLIC BLOOD PRESSURE: 144 MMHG | WEIGHT: 146.2 LBS | HEIGHT: 65 IN

## 2024-07-31 DIAGNOSIS — R00.2 PALPITATIONS: ICD-10-CM

## 2024-07-31 DIAGNOSIS — I10 WHITE COAT SYNDROME WITH HYPERTENSION: Primary | ICD-10-CM

## 2024-07-31 DIAGNOSIS — E78.2 MIXED HYPERLIPIDEMIA: ICD-10-CM

## 2024-07-31 PROCEDURE — 1123F ACP DISCUSS/DSCN MKR DOCD: CPT | Performed by: INTERNAL MEDICINE

## 2024-07-31 PROCEDURE — G8427 DOCREV CUR MEDS BY ELIG CLIN: HCPCS | Performed by: INTERNAL MEDICINE

## 2024-07-31 PROCEDURE — 99214 OFFICE O/P EST MOD 30 MIN: CPT | Performed by: INTERNAL MEDICINE

## 2024-07-31 PROCEDURE — 3078F DIAST BP <80 MM HG: CPT | Performed by: INTERNAL MEDICINE

## 2024-07-31 PROCEDURE — 1036F TOBACCO NON-USER: CPT | Performed by: INTERNAL MEDICINE

## 2024-07-31 PROCEDURE — G8420 CALC BMI NORM PARAMETERS: HCPCS | Performed by: INTERNAL MEDICINE

## 2024-07-31 PROCEDURE — 1090F PRES/ABSN URINE INCON ASSESS: CPT | Performed by: INTERNAL MEDICINE

## 2024-07-31 PROCEDURE — G8399 PT W/DXA RESULTS DOCUMENT: HCPCS | Performed by: INTERNAL MEDICINE

## 2024-07-31 PROCEDURE — 3077F SYST BP >= 140 MM HG: CPT | Performed by: INTERNAL MEDICINE

## 2024-07-31 ASSESSMENT — ENCOUNTER SYMPTOMS
RESPIRATORY NEGATIVE: 1
BACK PAIN: 0
EYES NEGATIVE: 1
GASTROINTESTINAL NEGATIVE: 1
SHORTNESS OF BREATH: 0
CHEST TIGHTNESS: 0
EYE PAIN: 0
ALLERGIC/IMMUNOLOGIC NEGATIVE: 1
PHOTOPHOBIA: 0
ABDOMINAL PAIN: 0

## 2024-07-31 NOTE — PROGRESS NOTES
History of breast cancer 10/31/2018    Hypertension     Hypothyroidism, adult     Mixed hyperlipidemia     Osteopenia of multiple sites 2019    Thyroid disease      Past Surgical History:   Procedure Laterality Date    BREAST LUMPECTOMY Left     CHOLECYSTECTOMY  2017    with stent and stent removal    COLONOSCOPY      WISDOM TOOTH EXTRACTION       Family History   Problem Relation Age of Onset    Cancer Mother         Breast CA    Heart Attack Father         M.I    Diabetes Father     Heart Disease Mother      Social History     Tobacco Use    Smoking status: Former     Current packs/day: 0.00     Types: Cigarettes     Quit date: 1983     Years since quittin.6    Smokeless tobacco: Never   Substance Use Topics    Alcohol use: Yes     Alcohol/week: 0.0 standard drinks of alcohol       ROS:  Review of Systems   Constitutional: Negative.  Negative for fever.   HENT:  Negative for hearing loss, nosebleeds and tinnitus.    Eyes: Negative.  Negative for photophobia and pain.   Respiratory: Negative.  Negative for chest tightness and shortness of breath.    Cardiovascular: Negative.  Negative for chest pain, palpitations and leg swelling.   Gastrointestinal: Negative.  Negative for abdominal pain.   Endocrine: Negative.  Negative for cold intolerance and heat intolerance.   Genitourinary: Negative.  Negative for dysuria.   Musculoskeletal: Negative.  Negative for back pain and joint swelling.   Skin: Negative.  Negative for rash.   Allergic/Immunologic: Negative.  Negative for immunocompromised state.   Neurological: Negative.  Negative for dizziness, syncope and light-headedness.   Hematological: Negative.  Does not bruise/bleed easily.   Psychiatric/Behavioral: Negative.  Negative for suicidal ideas.            PHYSICAL EXAM:  Physical Exam  Constitutional:       General: She is not in acute distress.     Appearance: She is not ill-appearing.   HENT:      Head: Normocephalic and atraumatic.      Nose: No

## 2024-08-01 ENCOUNTER — CARE COORDINATION (OUTPATIENT)
Dept: CARE COORDINATION | Facility: CLINIC | Age: 84
End: 2024-08-01

## 2024-08-01 NOTE — CARE COORDINATION
Ambulatory Care Coordination Note     8/1/2024 9:22 AM     Care Summary Note:   Incoming call from patient   - has seen Dr. Fernandez in follow up   - overall doing really well, BP stable - feeling good.  Will switch PCPs this month  ACM will continue to follow, patient to call with questions, concerns     ACM: Radha Shah RN    PCP/Specialist follow up:   Future Appointments         Provider Specialty Dept Phone    8/13/2024 2:00 PM Florian Gonzalez MD Family Medicine [phone]    2/14/2025 11:00 AM Bert Fernandez MD Cardiology [phone]

## 2024-08-13 ENCOUNTER — OFFICE VISIT (OUTPATIENT)
Dept: FAMILY MEDICINE CLINIC | Facility: CLINIC | Age: 84
End: 2024-08-13
Payer: MEDICARE

## 2024-08-13 VITALS
OXYGEN SATURATION: 99 % | HEIGHT: 65 IN | BODY MASS INDEX: 24.32 KG/M2 | WEIGHT: 146 LBS | HEART RATE: 66 BPM | DIASTOLIC BLOOD PRESSURE: 74 MMHG | SYSTOLIC BLOOD PRESSURE: 136 MMHG

## 2024-08-13 DIAGNOSIS — N18.30 CONTROLLED TYPE 2 DIABETES MELLITUS WITH STAGE 3 CHRONIC KIDNEY DISEASE, WITHOUT LONG-TERM CURRENT USE OF INSULIN (HCC): ICD-10-CM

## 2024-08-13 DIAGNOSIS — I10 PRIMARY HYPERTENSION: Primary | ICD-10-CM

## 2024-08-13 DIAGNOSIS — M85.89 OSTEOPENIA OF MULTIPLE SITES: ICD-10-CM

## 2024-08-13 DIAGNOSIS — E03.9 HYPOTHYROIDISM, ADULT: ICD-10-CM

## 2024-08-13 DIAGNOSIS — M1A.0790 CHRONIC IDIOPATHIC GOUT INVOLVING TOE WITHOUT TOPHUS, UNSPECIFIED LATERALITY: ICD-10-CM

## 2024-08-13 DIAGNOSIS — E78.2 MIXED HYPERLIPIDEMIA: ICD-10-CM

## 2024-08-13 DIAGNOSIS — E11.22 CONTROLLED TYPE 2 DIABETES MELLITUS WITH STAGE 3 CHRONIC KIDNEY DISEASE, WITHOUT LONG-TERM CURRENT USE OF INSULIN (HCC): ICD-10-CM

## 2024-08-13 LAB
ALBUMIN SERPL-MCNC: 4.1 G/DL (ref 3.2–4.6)
ALBUMIN/GLOB SERPL: 1.3 (ref 1–1.9)
ALP SERPL-CCNC: 55 U/L (ref 35–104)
ALT SERPL-CCNC: 17 U/L (ref 12–65)
ANION GAP SERPL CALC-SCNC: 11 MMOL/L (ref 9–18)
AST SERPL-CCNC: 23 U/L (ref 15–37)
BASOPHILS # BLD: 0 K/UL (ref 0–0.2)
BASOPHILS NFR BLD: 1 % (ref 0–2)
BILIRUB SERPL-MCNC: 0.3 MG/DL (ref 0–1.2)
BUN SERPL-MCNC: 26 MG/DL (ref 8–23)
CALCIUM SERPL-MCNC: 9.8 MG/DL (ref 8.8–10.2)
CHLORIDE SERPL-SCNC: 105 MMOL/L (ref 98–107)
CHOLEST SERPL-MCNC: 103 MG/DL (ref 0–200)
CO2 SERPL-SCNC: 24 MMOL/L (ref 20–28)
CREAT SERPL-MCNC: 0.92 MG/DL (ref 0.6–1.1)
CREAT UR-MCNC: 77.3 MG/DL (ref 28–217)
DIFFERENTIAL METHOD BLD: NORMAL
EOSINOPHIL # BLD: 0.1 K/UL (ref 0–0.8)
EOSINOPHIL NFR BLD: 2 % (ref 0.5–7.8)
ERYTHROCYTE [DISTWIDTH] IN BLOOD BY AUTOMATED COUNT: 14.6 % (ref 11.9–14.6)
EST. AVERAGE GLUCOSE BLD GHB EST-MCNC: 154 MG/DL
GLOBULIN SER CALC-MCNC: 3.2 G/DL (ref 2.3–3.5)
GLUCOSE SERPL-MCNC: 92 MG/DL (ref 70–99)
HBA1C MFR BLD: 7 % (ref 0–5.6)
HCT VFR BLD AUTO: 39.8 % (ref 35.8–46.3)
HDLC SERPL-MCNC: 38 MG/DL (ref 40–60)
HDLC SERPL: 2.7 (ref 0–5)
HGB BLD-MCNC: 12.5 G/DL (ref 11.7–15.4)
IMM GRANULOCYTES # BLD AUTO: 0 K/UL (ref 0–0.5)
IMM GRANULOCYTES NFR BLD AUTO: 1 % (ref 0–5)
LDLC SERPL CALC-MCNC: 47 MG/DL (ref 0–100)
LYMPHOCYTES # BLD: 1.5 K/UL (ref 0.5–4.6)
LYMPHOCYTES NFR BLD: 25 % (ref 13–44)
MCH RBC QN AUTO: 28.3 PG (ref 26.1–32.9)
MCHC RBC AUTO-ENTMCNC: 31.4 G/DL (ref 31.4–35)
MCV RBC AUTO: 90.2 FL (ref 82–102)
MICROALBUMIN UR-MCNC: 3.13 MG/DL (ref 0–20)
MICROALBUMIN/CREAT UR-RTO: 40 MG/G (ref 0–30)
MONOCYTES # BLD: 0.5 K/UL (ref 0.1–1.3)
MONOCYTES NFR BLD: 9 % (ref 4–12)
NEUTS SEG # BLD: 3.7 K/UL (ref 1.7–8.2)
NEUTS SEG NFR BLD: 62 % (ref 43–78)
NRBC # BLD: 0 K/UL (ref 0–0.2)
PLATELET # BLD AUTO: 288 K/UL (ref 150–450)
PMV BLD AUTO: 10.8 FL (ref 9.4–12.3)
POTASSIUM SERPL-SCNC: 4.5 MMOL/L (ref 3.5–5.1)
PROT SERPL-MCNC: 7.3 G/DL (ref 6.3–8.2)
RBC # BLD AUTO: 4.41 M/UL (ref 4.05–5.2)
SODIUM SERPL-SCNC: 140 MMOL/L (ref 136–145)
TRIGL SERPL-MCNC: 91 MG/DL (ref 0–150)
TSH, 3RD GENERATION: 1.99 UIU/ML (ref 0.27–4.2)
VLDLC SERPL CALC-MCNC: 18 MG/DL (ref 6–23)
WBC # BLD AUTO: 5.9 K/UL (ref 4.3–11.1)

## 2024-08-13 PROCEDURE — 3044F HG A1C LEVEL LT 7.0%: CPT | Performed by: FAMILY MEDICINE

## 2024-08-13 PROCEDURE — 3075F SYST BP GE 130 - 139MM HG: CPT | Performed by: FAMILY MEDICINE

## 2024-08-13 PROCEDURE — G8420 CALC BMI NORM PARAMETERS: HCPCS | Performed by: FAMILY MEDICINE

## 2024-08-13 PROCEDURE — G8399 PT W/DXA RESULTS DOCUMENT: HCPCS | Performed by: FAMILY MEDICINE

## 2024-08-13 PROCEDURE — 3078F DIAST BP <80 MM HG: CPT | Performed by: FAMILY MEDICINE

## 2024-08-13 PROCEDURE — G8427 DOCREV CUR MEDS BY ELIG CLIN: HCPCS | Performed by: FAMILY MEDICINE

## 2024-08-13 PROCEDURE — 1036F TOBACCO NON-USER: CPT | Performed by: FAMILY MEDICINE

## 2024-08-13 PROCEDURE — 1090F PRES/ABSN URINE INCON ASSESS: CPT | Performed by: FAMILY MEDICINE

## 2024-08-13 PROCEDURE — 99214 OFFICE O/P EST MOD 30 MIN: CPT | Performed by: FAMILY MEDICINE

## 2024-08-13 PROCEDURE — 1123F ACP DISCUSS/DSCN MKR DOCD: CPT | Performed by: FAMILY MEDICINE

## 2024-08-13 RX ORDER — ALLOPURINOL 100 MG/1
150 TABLET ORAL DAILY
Qty: 135 TABLET | Refills: 3 | Status: SHIPPED | OUTPATIENT
Start: 2024-08-13 | End: 2025-08-13

## 2024-08-13 RX ORDER — RALOXIFENE HYDROCHLORIDE 60 MG/1
60 TABLET, FILM COATED ORAL DAILY
Qty: 90 TABLET | Refills: 3 | Status: SHIPPED | OUTPATIENT
Start: 2024-08-13 | End: 2025-08-13

## 2024-08-13 RX ORDER — SPIRONOLACTONE 25 MG/1
25 TABLET ORAL DAILY
Qty: 90 TABLET | Refills: 3 | Status: SHIPPED | OUTPATIENT
Start: 2024-08-13 | End: 2025-08-13

## 2024-08-13 RX ORDER — LEVOTHYROXINE SODIUM 0.07 MG/1
75 TABLET ORAL DAILY
Qty: 90 TABLET | Refills: 3 | Status: SHIPPED | OUTPATIENT
Start: 2024-08-13 | End: 2025-08-13

## 2024-08-13 RX ORDER — CARVEDILOL 25 MG/1
25 TABLET ORAL 2 TIMES DAILY
Qty: 180 TABLET | Refills: 3 | Status: SHIPPED | OUTPATIENT
Start: 2024-08-13 | End: 2025-08-13

## 2024-08-13 RX ORDER — BENAZEPRIL HYDROCHLORIDE 40 MG/1
40 TABLET ORAL DAILY
Qty: 90 TABLET | Refills: 3 | Status: SHIPPED | OUTPATIENT
Start: 2024-08-13 | End: 2025-08-13

## 2024-08-13 RX ORDER — FUROSEMIDE 20 MG/1
20 TABLET ORAL DAILY PRN
Qty: 90 TABLET | Refills: 3 | Status: SHIPPED | OUTPATIENT
Start: 2024-08-13 | End: 2025-08-13

## 2024-08-13 RX ORDER — ROSUVASTATIN CALCIUM 5 MG/1
5 TABLET, COATED ORAL DAILY
Qty: 90 TABLET | Refills: 3 | Status: SHIPPED | OUTPATIENT
Start: 2024-08-13 | End: 2025-08-13

## 2024-08-13 SDOH — ECONOMIC STABILITY: FOOD INSECURITY: WITHIN THE PAST 12 MONTHS, YOU WORRIED THAT YOUR FOOD WOULD RUN OUT BEFORE YOU GOT MONEY TO BUY MORE.: NEVER TRUE

## 2024-08-13 SDOH — ECONOMIC STABILITY: FOOD INSECURITY: WITHIN THE PAST 12 MONTHS, THE FOOD YOU BOUGHT JUST DIDN'T LAST AND YOU DIDN'T HAVE MONEY TO GET MORE.: NEVER TRUE

## 2024-08-13 SDOH — ECONOMIC STABILITY: INCOME INSECURITY: HOW HARD IS IT FOR YOU TO PAY FOR THE VERY BASICS LIKE FOOD, HOUSING, MEDICAL CARE, AND HEATING?: NOT HARD AT ALL

## 2024-08-13 ASSESSMENT — ENCOUNTER SYMPTOMS: RESPIRATORY NEGATIVE: 1

## 2024-08-13 NOTE — PROGRESS NOTES
PROGRESS NOTE    SUBJECTIVE:   Amanda Morris is a 84 y.o. female seen for a follow up visit regarding   Chief Complaint   Patient presents with    Establish Care     Here to establish care with provider; former patient of Dr. Babcock  Has been having issues with BP; seen Cardiology and BP is better controlled now         HPI:  HPI     Reviewed and updated this visit by provider:  Tobacco  Allergies  Meds  Problems  Med Hx  Surg Hx  Fam Hx           Review of Systems   Respiratory: Negative.     Cardiovascular: Negative.           OBJECTIVE:  Vitals:    08/13/24 1406   BP: 136/74   Site: Left Upper Arm   Cuff Size: Medium Adult   Pulse: 66   SpO2: 99%   Weight: 66.2 kg (146 lb)   Height: 1.651 m (5' 5\")        Physical Exam   General: Alert and oriented x3, well-appearing  Neck: No adenopathy, thyromegaly or thyroid nodules  Pulmonary: Normal effort, good airflow, no rales or rhonchi  CVS: Regular rate and rhythm, normal S1, S2, no S3 or S4, no murmurs; no carotid bruits, 2+ pedal pulses      Medical problems and test results were reviewed with the patient today.     Recent Results (from the past 672 hour(s))   Echo (TTE) complete (PRN contrast/bubble/strain/3D)    Collection Time: 07/23/24  8:06 AM   Result Value Ref Range    LV ESV A4C 34 mL    LV EDV A4C 70 mL    LV ESV A2C 22 mL    LV EDV A2C 75 mL    LV E' Septal Velocity 7 cm/s    LVOT Peak Velocity 0.9 m/s    LV E' Lateral Velocity 8 cm/s    LVPWd 0.8 0.6 - 0.9 cm    LVOT Peak Gradient 3 mmHg    IVSd 0.9 0.6 - 0.9 cm    LVIDs 2.4 cm    LVIDd 3.9 3.9 - 5.3 cm    EF BP 63 55 - 100 %    LV Ejection Fraction A2C 70 %    LV Ejection Fraction A4C 52 %    RVIDd 2.7 cm    AV Peak Gradient 5 mmHg    AV Peak Velocity 1.2 m/s    MV E Velocity 1.01 m/s    MV E Wave Deceleration Time 220.0 ms    MV A Velocity 0.83 m/s    TR Max Velocity 2.29 m/s    TR Peak Gradient 21 mmHg    Ascending Aorta 3.6 cm    Aortic Root 3.2 cm    Body Surface Area 1.75 m2

## 2024-08-14 ENCOUNTER — CARE COORDINATION (OUTPATIENT)
Dept: CARE COORDINATION | Facility: CLINIC | Age: 84
End: 2024-08-14

## 2024-08-14 NOTE — CARE COORDINATION
Ambulatory Care Coordination Note     8/14/2024 11:55 AM     Care Summary Note:   Outreach for ongoing case management - spoke with patient  Patient now established with new PCP   - lab work updated   - patient says she is probably now a diabetic (HgbA1c elevated a bit).  Discussed diabetic diet which patient mostly follows anyway.     - discussed shampoos/conditioner for hair loss (BosleyMD), patient has a wig    Concerns   - patient/ going to sell their home which sits on [place] acres   - process of moving can be stressful   - discussed downsizing, but perhaps not to a [facility]    PLAN for follow up call in a few weeks.       ACM: Radha Shah RN     Challenges to be reviewed by the provider   Additional needs identified to be addressed with provider Yes  She is asking about interval between appointments               Method of communication with provider: chart routing.    PCP/Specialist follow up:   Future Appointments         Provider Specialty Dept Phone    2/14/2025 11:00 AM Bert Fernandez MD Cardiology [phone]

## 2024-08-15 ENCOUNTER — TELEPHONE (OUTPATIENT)
Dept: FAMILY MEDICINE CLINIC | Facility: CLINIC | Age: 84
End: 2024-08-15

## 2024-08-15 DIAGNOSIS — E11.22 CONTROLLED TYPE 2 DIABETES MELLITUS WITH STAGE 3 CHRONIC KIDNEY DISEASE, WITHOUT LONG-TERM CURRENT USE OF INSULIN (HCC): Primary | ICD-10-CM

## 2024-08-15 DIAGNOSIS — N18.30 CONTROLLED TYPE 2 DIABETES MELLITUS WITH STAGE 3 CHRONIC KIDNEY DISEASE, WITHOUT LONG-TERM CURRENT USE OF INSULIN (HCC): Primary | ICD-10-CM

## 2024-08-15 NOTE — TELEPHONE ENCOUNTER
Patient called and stated she was to have a prescription for metformin sent in she stated she had not previously been on this but provider said he was sending in. Patient stated pharmacy did not have prescription please send to [facility]

## 2024-08-16 DIAGNOSIS — E11.9 TYPE 2 DIABETES MELLITUS WITHOUT COMPLICATION, WITHOUT LONG-TERM CURRENT USE OF INSULIN (HCC): Primary | ICD-10-CM

## 2024-08-16 RX ORDER — METFORMIN HYDROCHLORIDE 500 MG/1
500 TABLET, EXTENDED RELEASE ORAL
Qty: 180 TABLET | Refills: 3 | Status: SHIPPED | OUTPATIENT
Start: 2024-08-16 | End: 2025-08-16

## 2024-09-09 ENCOUNTER — CARE COORDINATION (OUTPATIENT)
Dept: CARE COORDINATION | Facility: CLINIC | Age: 84
End: 2024-09-09

## 2024-09-20 ENCOUNTER — PATIENT MESSAGE (OUTPATIENT)
Dept: FAMILY MEDICINE CLINIC | Facility: CLINIC | Age: 84
End: 2024-09-20

## 2024-09-20 DIAGNOSIS — N18.30 CONTROLLED TYPE 2 DIABETES MELLITUS WITH STAGE 3 CHRONIC KIDNEY DISEASE, WITHOUT LONG-TERM CURRENT USE OF INSULIN (HCC): Primary | ICD-10-CM

## 2024-09-20 DIAGNOSIS — E11.22 CONTROLLED TYPE 2 DIABETES MELLITUS WITH STAGE 3 CHRONIC KIDNEY DISEASE, WITHOUT LONG-TERM CURRENT USE OF INSULIN (HCC): Primary | ICD-10-CM

## 2024-09-23 ENCOUNTER — CARE COORDINATION (OUTPATIENT)
Dept: CARE COORDINATION | Facility: CLINIC | Age: 84
End: 2024-09-23

## 2024-09-30 ENCOUNTER — CARE COORDINATION (OUTPATIENT)
Dept: CARE COORDINATION | Facility: CLINIC | Age: 84
End: 2024-09-30
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
## 2024-10-01 RX ORDER — REPAGLINIDE 0.5 MG/1
0.5 TABLET ORAL
Qty: 90 TABLET | Refills: 3 | Status: SHIPPED | OUTPATIENT
Start: 2024-10-01

## 2024-10-10 ENCOUNTER — CARE COORDINATION (OUTPATIENT)
Dept: CARE COORDINATION | Facility: CLINIC | Age: 84
End: 2024-10-10
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
## 2024-10-16 ENCOUNTER — CARE COORDINATION (OUTPATIENT)
Dept: CARE COORDINATION | Facility: CLINIC | Age: 84
End: 2024-10-16

## 2024-10-16 NOTE — CARE COORDINATION
Ambulatory Care Coordination Note     10/16/2024 10:51 AM     Care Summary Note:   Incoming call from patient   - feels she made a bad decision by changing PCPs.  Concerned because she doesn't feel \"heard\" like before.  Advised to call and book an appointment.     ACM: Radha Shah, URVASHI     Method of communication with provider: chart routing.    PCP/Specialist follow up:   Future Appointments         Provider Specialty Dept Phone    11/8/2024 1:15 PM Anthony Babcock DO Internal Medicine [phone]    2/14/2025 11:00 AM Bert Fernandez MD Cardiology [phone]    2/18/2025 8:00 AM [facility] [phone]    2/24/2025 8:40 AM Florian Gonzalez MD Family Medicine [phone]

## 2024-10-16 NOTE — CARE COORDINATION
Ambulatory Care Coordination Note     10/16/2024 9:49 AM    Care Summary Note:    Incoming call from patient  Reports BS this morning 119  Experiencing so much discomfort including diarrhea from the metformin.  States her plan is to manage the diabetes with diet.  Will continue to check BS.  Blood pressure holding stable.  PLAN for follow up in about 2 weeks.     ACM: Radha Shah RN    Has the patient been seen in the ED since your last call? no    PCP/Specialist follow up:   Future Appointments         Provider Specialty Dept Phone    11/8/2024 1:15 PM Anthony Babcock DO Internal Medicine [phone]    2/14/2025 11:00 AM Bert Fernandez MD Cardiology [phone]    2/18/2025 8:00 AM [facility] [phone]    2/24/2025 8:40 AM Florian Gonzalez MD Family Medicine [phone]

## 2024-10-30 ENCOUNTER — CARE COORDINATION (OUTPATIENT)
Dept: CARE COORDINATION | Facility: CLINIC | Age: 84
End: 2024-10-30

## 2024-10-30 NOTE — CARE COORDINATION
Ambulatory Care Coordination Note     10/30/2024 11:47 AM     Outreach for High Risk Case Management - spoke with patient  Blood pressures are stable - one high readiang high 162/72  (Dr. Fernandez Managing)  Blood sugars - has a new \"reader\"  The highest reading is 143 which is highest patient has had in her lifetime.  Currently managing with diet.     ACM: Radha Shah, URVASHI     Has the patient been seen in the ED since your last call? no    PCP/Specialist follow up:   Future Appointments         Provider Specialty Dept Phone    11/8/2024 1:15 PM Anthony Babcock DO Internal Medicine [phone]    2/14/2025 11:00 AM Bert Fernandez MD Cardiology [phone]    2/18/2025 8:00 AM [facility] [phone]    2/24/2025 8:40 AM Florian Gonzalez MD Family Medicine [phone]

## 2024-11-08 ENCOUNTER — OFFICE VISIT (OUTPATIENT)
Dept: INTERNAL MEDICINE CLINIC | Facility: CLINIC | Age: 84
End: 2024-11-08

## 2024-11-08 ENCOUNTER — CARE COORDINATION (OUTPATIENT)
Dept: CARE COORDINATION | Facility: CLINIC | Age: 84
End: 2024-11-08

## 2024-11-08 VITALS
OXYGEN SATURATION: 98 % | HEIGHT: 65 IN | SYSTOLIC BLOOD PRESSURE: 124 MMHG | BODY MASS INDEX: 24.16 KG/M2 | HEART RATE: 70 BPM | DIASTOLIC BLOOD PRESSURE: 70 MMHG | WEIGHT: 145 LBS

## 2024-11-08 DIAGNOSIS — E11.22 CONTROLLED TYPE 2 DIABETES MELLITUS WITH STAGE 3 CHRONIC KIDNEY DISEASE, WITHOUT LONG-TERM CURRENT USE OF INSULIN (HCC): ICD-10-CM

## 2024-11-08 DIAGNOSIS — N18.31 CHRONIC KIDNEY DISEASE, STAGE 3A (HCC): ICD-10-CM

## 2024-11-08 DIAGNOSIS — Z00.00 MEDICARE ANNUAL WELLNESS VISIT, SUBSEQUENT: Primary | ICD-10-CM

## 2024-11-08 DIAGNOSIS — I10 HTN (HYPERTENSION), BENIGN: ICD-10-CM

## 2024-11-08 DIAGNOSIS — I1A.0 RESISTANT HYPERTENSION: ICD-10-CM

## 2024-11-08 DIAGNOSIS — N18.30 CONTROLLED TYPE 2 DIABETES MELLITUS WITH STAGE 3 CHRONIC KIDNEY DISEASE, WITHOUT LONG-TERM CURRENT USE OF INSULIN (HCC): ICD-10-CM

## 2024-11-08 LAB
ANION GAP SERPL CALC-SCNC: 12 MMOL/L (ref 7–16)
BUN SERPL-MCNC: 30 MG/DL (ref 8–23)
CALCIUM SERPL-MCNC: 10.3 MG/DL (ref 8.8–10.2)
CHLORIDE SERPL-SCNC: 106 MMOL/L (ref 98–107)
CO2 SERPL-SCNC: 23 MMOL/L (ref 20–29)
CREAT SERPL-MCNC: 0.92 MG/DL (ref 0.6–1.1)
EST. AVERAGE GLUCOSE BLD GHB EST-MCNC: 138 MG/DL
GLUCOSE SERPL-MCNC: 94 MG/DL (ref 70–99)
HBA1C MFR BLD: 6.4 % (ref 0–5.6)
POTASSIUM SERPL-SCNC: 4.4 MMOL/L (ref 3.5–5.1)
SODIUM SERPL-SCNC: 142 MMOL/L (ref 136–145)

## 2024-11-08 RX ORDER — CLONIDINE HYDROCHLORIDE 0.1 MG/1
0.1 TABLET ORAL 2 TIMES DAILY PRN
Qty: 60 TABLET | Refills: 3 | Status: SHIPPED | OUTPATIENT
Start: 2024-11-08

## 2024-11-08 ASSESSMENT — PATIENT HEALTH QUESTIONNAIRE - PHQ9
SUM OF ALL RESPONSES TO PHQ QUESTIONS 1-9: 0
2. FEELING DOWN, DEPRESSED OR HOPELESS: NOT AT ALL
SUM OF ALL RESPONSES TO PHQ QUESTIONS 1-9: 0
SUM OF ALL RESPONSES TO PHQ QUESTIONS 1-9: 0
1. LITTLE INTEREST OR PLEASURE IN DOING THINGS: NOT AT ALL
SUM OF ALL RESPONSES TO PHQ QUESTIONS 1-9: 0
SUM OF ALL RESPONSES TO PHQ9 QUESTIONS 1 & 2: 0

## 2024-11-08 ASSESSMENT — LIFESTYLE VARIABLES
HOW MANY STANDARD DRINKS CONTAINING ALCOHOL DO YOU HAVE ON A TYPICAL DAY: PATIENT DOES NOT DRINK
HOW OFTEN DO YOU HAVE A DRINK CONTAINING ALCOHOL: NEVER

## 2024-11-08 NOTE — CARE COORDINATION
.Ambulatory Care Coordination Note     11/8/2024 2:31 PM     Care Summary Note:   Incoming call  Patient has seen PCP today.  Lab work drawn  Looking to keep HgbA1c around 7-7.5 - controlling with diet for now.  Patient reports she is feeling really well.  ( improving)     ACM: Radha Shah, URVASHI     Method of communication with provider: chart routing.    PCP/Specialist follow up:   Future Appointments         Provider Specialty Dept Phone    2/5/2025 1:45 PM Atnhony Babcock DO Internal Medicine [phone]    2/14/2025 11:00 AM Bert Fernandez MD Cardiology [phone]    2/18/2025 8:00 AM [facility] [phone]    2/24/2025 8:40 AM Florian Gonzalez MD Family Medicine [phone]

## 2024-11-08 NOTE — PATIENT INSTRUCTIONS
adult-strength or 2 to 4 low-dose aspirin. Wait for an ambulance. Do not try to drive yourself.  Watch closely for changes in your health, and be sure to contact your doctor if you have any problems.  Where can you learn more?  Go to https://www.[employer] mobile.net/patientEd and enter F075 to learn more about \"A Healthy Heart: Care Instructions.\"  Current as of: June 24, 2023  Content Version: 14.2  © 2024 [employer].   Care instructions adapted under license by [employer]. If you have questions about a medical condition or this instruction, always ask your healthcare professional. [employer], [employer] disclaims any warranty or liability for your use of this information.      Personalized Preventive Plan for Amanda Morris - 11/8/2024  Medicare offers a range of preventive health benefits. Some of the tests and screenings are paid in full while other may be subject to a deductible, co-insurance, and/or copay.    Some of these benefits include a comprehensive review of your medical history including lifestyle, illnesses that may run in your family, and various assessments and screenings as appropriate.    After reviewing your medical record and screening and assessments performed today your provider may have ordered immunizations, labs, imaging, and/or referrals for you.  A list of these orders (if applicable) as well as your Preventive Care list are included within your After Visit Summary for your review.    Other Preventive Recommendations:    A preventive eye exam performed by an eye specialist is recommended every 1-2 years to screen for glaucoma; cataracts, macular degeneration, and other eye disorders.  A preventive dental visit is recommended every 6 months.  Try to get at least 150 minutes of exercise per week or 10,000 steps per day on a pedometer .  Order or download the FREE \"Exercise & Physical Activity: Your Everyday Guide\" from [employer]. Call 1-608.996.6690

## 2024-11-08 NOTE — PROGRESS NOTES
indicated follow up appointments were made and/or referrals ordered.    Positive Risk Factor Screenings with Interventions:              Inactivity:  On average, how many days per week do you engage in moderate to strenuous exercise (like a brisk walk)?: 0 days (!) Abnormal  On average, how many minutes do you engage in exercise at this level?: 0 min  Interventions:  Encourage activity as able.      Dentist Screen:  Have you seen the dentist within the past year?: (!) No    Intervention:  Advised to schedule with their dentist                  Objective   Vitals:    11/08/24 1309   BP: 124/70   Site: Left Upper Arm   Position: Sitting   Pulse: 70   SpO2: 98%   Weight: 65.8 kg (145 lb)   Height: 1.651 m (5' 5\")      Body mass index is 24.13 kg/m².        Physical Exam  Vitals reviewed.   Cardiovascular:      Rate and Rhythm: Normal rate and regular rhythm.   Pulmonary:      Effort: Pulmonary effort is normal.      Breath sounds: Normal breath sounds.   Skin:     General: Skin is warm and dry.   Neurological:      Mental Status: She is alert.                 Allergies   Allergen Reactions    Metformin And Related Diarrhea    Tramadol Nausea Only    Atorvastatin Palpitations     Prior to Visit Medications    Medication Sig Taking? Authorizing Provider   cloNIDine (CATAPRES) 0.1 MG tablet Take 1 tablet by mouth 2 times daily as needed for High Blood Pressure (SBP >160) Yes Brothers, Anthony T, DO   allopurinol (ZYLOPRIM) 100 MG tablet Take 1.5 tablets by mouth daily Yes Florian Gonzalez MD   benazepril (LOTENSIN) 40 MG tablet Take 1 tablet by mouth daily Yes Florian Gonzalez MD   carvedilol (COREG) 25 MG tablet Take 1 tablet by mouth 2 times daily Yes Florian Gonzalez MD   furosemide (LASIX) 20 MG tablet Take 1 tablet by mouth daily as needed (leg swelling) Yes Florian Gonzalez MD   levothyroxine (SYNTHROID) 75 MCG tablet Take 1 tablet by mouth Daily Yes Florian Gonzalez MD   raloxifene (EVISTA) 60 MG tablet Take 1 tablet by mouth

## 2024-11-26 ENCOUNTER — CARE COORDINATION (OUTPATIENT)
Dept: CARE COORDINATION | Facility: CLINIC | Age: 84
End: 2024-11-26

## 2024-11-26 NOTE — CARE COORDINATION
Ambulatory Care Coordination Note     11/26/2024 11:38 AM     Care Summary Note:   Outreach for High Risk Case Management - spoke with patient  (Spoke earlier with  - also ACM's patient)  The couple getting ready to head out to do \"something.\"  Patient reiterates the void left by their little diabetic dog.  Confirms that they have plans for Thanksgiving - with friends at a restaurant.     ACM: Radha Shah RN     Method of communication with provider: none.    Utilization: Has the patient been seen in the ED since your last call? no    PCP/Specialist follow up:   Future Appointments         Provider Specialty Dept Phone    2/5/2025 1:45 PM Anthony Babcock,  Internal Medicine [phone]    2/14/2025 11:00 AM Bert Fernandez MD Cardiology [phone]

## 2024-12-12 ENCOUNTER — CARE COORDINATION (OUTPATIENT)
Dept: CARE COORDINATION | Facility: CLINIC | Age: 84
End: 2024-12-12

## 2024-12-12 NOTE — CARE COORDINATION
Ambulatory Care Coordination Note     12/12/2024 2:01 PM     Care Summary Note:   Outreach for High Risk Case Management - incoming call from patient  Discussed medication administration, patient concerned re: timing of these.  BP doing reasonably well 144 systolic most recently.  Patient monitoring.  Remains concerned about her .  Looking for another dog.  States she appreciates case management support - ACM will continue.     ACM: Radha Shah, URVASHI     Utilization: Has the patient been seen in the ED since your last call? no    PCP/Specialist follow up:   Future Appointments         Provider Specialty Dept Phone    2/5/2025 1:45 PM Anthony Babcock DO Internal Medicine [phone]    2/14/2025 11:00 AM Bert Fernandez MD Cardiology [phone]

## 2024-12-24 ENCOUNTER — CARE COORDINATION (OUTPATIENT)
Dept: CARE COORDINATION | Facility: CLINIC | Age: 84
End: 2024-12-24

## 2024-12-30 DIAGNOSIS — E03.9 HYPOTHYROIDISM, ADULT: ICD-10-CM

## 2024-12-31 RX ORDER — LEVOTHYROXINE SODIUM 75 UG/1
75 TABLET ORAL DAILY
Qty: 90 TABLET | Refills: 3 | OUTPATIENT
Start: 2024-12-31 | End: 2025-12-31

## 2025-01-07 ENCOUNTER — CARE COORDINATION (OUTPATIENT)
Dept: CARE COORDINATION | Facility: CLINIC | Age: 85
End: 2025-01-07

## 2025-01-07 NOTE — CARE COORDINATION
Ambulatory Care Coordination Note     1/7/2025 11:10 AM     Care Summary Note:   Outreach for High Risk Case Management - Unable to reach  Patient did text that she was doing fine - no specifics.  Will outreach another time.  Patient has contact information for questions/concerns.     ACM: Radha Shah RN     Method of communication with provider: chart routing.    Utilization: Has the patient been seen in the ED since your last call? no    PCP/Specialist follow up:   Future Appointments         Provider Specialty Dept Phone    2/5/2025 1:45 PM Anthony Babcock,  Internal Medicine [phone]    2/14/2025 11:00 AM Bert Fernandez MD Cardiology [phone]

## 2025-01-08 NOTE — ROUTINE PROCESS
Discharge instructions reviewed with pt and family member who verbalize understanding of follow up care. HISTORY OF PRESENT ILLNESS  William Ahn is a 64 y.o. male.   has a past medical history of Hypertension.  has a past surgical history that includes Cardiac procedure (N/A, 9/6/2024); invasive vascular (N/A, 9/6/2024); and Coronary artery bypass graft (N/A, 9/9/2024).  Chief Complaint   Patient presents with    Follow-up     He was seen on 12/19/2024 for balanitis.  There was an abnormal area on the glans he was treated with clotrimazole and betamethasone cream.  He is here in follow-up to make sure this lesion was resolving.        1. Balanitis  Overview:  12/2024: Left dorsal glans with a > 1cm area of moist abnormal area. It may be balanitis. If it is does not clear it may require a biopsy to rule to out Erythroplasia of [place].   Assessment & Plan:  Glans with balanitis.  Appears to be improving and is not worse.  He has not completely resolved.  He should continue the cream until resolved.  Plan on recheck again in 6 months    2. Erectile dysfunction, unspecified erectile dysfunction type  Overview:  RX: tadalafil  Assessment & Plan:  He has a prescription for tadalafil.  He can try when ready      No Known Allergies   Prior to Admission medications    Medication Sig Start Date End Date Taking? Authorizing Provider   rosuvastatin (CRESTOR) 40 MG tablet Take 1 tablet by mouth daily 1/2/25  Yes Michele Berman MD   fluticasone (FLONASE) 50 MCG/ACT nasal spray  12/12/24  Yes Provider, Historical, MD   carvedilol (COREG) 3.125 MG tablet Take 1 tablet by mouth 2 times daily 12/5/24  Yes Israel Valdovinos DO   FLUoxetine (PROZAC) 20 MG capsule Take 1 capsule by mouth every morning 12/2/24  Yes Provider, MD Humza   senna (SENOKOT) 8.6 MG tablet Take 1 tablet by mouth daily   Yes Provider, MD Humza   bumetanide (BUMEX) 1 MG tablet Take 1 tablet by mouth daily 10/28/24  Yes Anat Fair APRN - NP   aspirin 81 MG EC tablet Take 1 tablet by mouth daily 10/28/24  Yes Anat Fair APRN - NP

## 2025-02-02 SDOH — ECONOMIC STABILITY: FOOD INSECURITY: WITHIN THE PAST 12 MONTHS, THE FOOD YOU BOUGHT JUST DIDN'T LAST AND YOU DIDN'T HAVE MONEY TO GET MORE.: NEVER TRUE

## 2025-02-02 SDOH — ECONOMIC STABILITY: INCOME INSECURITY: IN THE LAST 12 MONTHS, WAS THERE A TIME WHEN YOU WERE NOT ABLE TO PAY THE MORTGAGE OR RENT ON TIME?: NO

## 2025-02-02 SDOH — ECONOMIC STABILITY: TRANSPORTATION INSECURITY
IN THE PAST 12 MONTHS, HAS THE LACK OF TRANSPORTATION KEPT YOU FROM MEDICAL APPOINTMENTS OR FROM GETTING MEDICATIONS?: NO

## 2025-02-02 SDOH — ECONOMIC STABILITY: FOOD INSECURITY: WITHIN THE PAST 12 MONTHS, YOU WORRIED THAT YOUR FOOD WOULD RUN OUT BEFORE YOU GOT MONEY TO BUY MORE.: NEVER TRUE

## 2025-02-02 ASSESSMENT — PATIENT HEALTH QUESTIONNAIRE - PHQ9
1. LITTLE INTEREST OR PLEASURE IN DOING THINGS: NOT AT ALL
SUM OF ALL RESPONSES TO PHQ9 QUESTIONS 1 & 2: 0
2. FEELING DOWN, DEPRESSED OR HOPELESS: NOT AT ALL
SUM OF ALL RESPONSES TO PHQ QUESTIONS 1-9: 0
2. FEELING DOWN, DEPRESSED OR HOPELESS: NOT AT ALL
1. LITTLE INTEREST OR PLEASURE IN DOING THINGS: NOT AT ALL
SUM OF ALL RESPONSES TO PHQ QUESTIONS 1-9: 0
SUM OF ALL RESPONSES TO PHQ QUESTIONS 1-9: 0
SUM OF ALL RESPONSES TO PHQ9 QUESTIONS 1 & 2: 0
SUM OF ALL RESPONSES TO PHQ QUESTIONS 1-9: 0

## 2025-02-04 ENCOUNTER — CARE COORDINATION (OUTPATIENT)
Dept: CARE COORDINATION | Facility: CLINIC | Age: 85
End: 2025-02-04

## 2025-02-04 NOTE — CARE COORDINATION
Ambulatory Care Coordination Note     2/4/2025 11:16 AM     Care Summary Note:   Outreach for High Risk Case Management - spoke with patient  Blood pressure staying controlled:  138/80 today  Not checking her BS, but is watching her diet.  Having trouble with her lancets. Suggested she take these with her to PCP appointment - MA might be able to assist.  Patient not sleeping well.    Patient confides that her 's anxiety has taken over the household.  He does little for himself and worries about every tiny possibility.   - unable to drive   - experiencing side effects of Zoloft (head aches, anxiety worse)   - not sleeping   - does not leave the house   - does not want her to leave much of the time   - seeing psychiatry, but this has not produced a lot of results.  Discussed [facility]:  Intensive Outpatient Program might help get meds on track   - suggested she discuss with PCP, who is also 's PCP     ACM: Radha Shah, URVASHI     Method of communication with provider: chart routing.    Utilization: Has the patient been seen in the ED since your last call? no      PCP/Specialist follow up:   Future Appointments         Provider Specialty Dept Phone    2/5/2025 1:45 PM Anthony Babcock DO Internal Medicine [phone]    2/14/2025 11:00 AM Bert Fernandez MD Cardiology [phone]

## 2025-02-05 ENCOUNTER — OFFICE VISIT (OUTPATIENT)
Dept: INTERNAL MEDICINE CLINIC | Facility: CLINIC | Age: 85
End: 2025-02-05
Payer: MEDICARE

## 2025-02-05 VITALS
BODY MASS INDEX: 24.49 KG/M2 | WEIGHT: 147 LBS | DIASTOLIC BLOOD PRESSURE: 60 MMHG | HEIGHT: 65 IN | OXYGEN SATURATION: 98 % | HEART RATE: 70 BPM | SYSTOLIC BLOOD PRESSURE: 136 MMHG

## 2025-02-05 DIAGNOSIS — E03.9 HYPOTHYROIDISM, ADULT: ICD-10-CM

## 2025-02-05 DIAGNOSIS — M1A.0790 CHRONIC IDIOPATHIC GOUT INVOLVING TOE WITHOUT TOPHUS, UNSPECIFIED LATERALITY: ICD-10-CM

## 2025-02-05 DIAGNOSIS — E78.2 MIXED HYPERLIPIDEMIA: ICD-10-CM

## 2025-02-05 DIAGNOSIS — N18.30 CONTROLLED TYPE 2 DIABETES MELLITUS WITH STAGE 3 CHRONIC KIDNEY DISEASE, WITHOUT LONG-TERM CURRENT USE OF INSULIN (HCC): ICD-10-CM

## 2025-02-05 DIAGNOSIS — M85.89 OSTEOPENIA OF MULTIPLE SITES: ICD-10-CM

## 2025-02-05 DIAGNOSIS — I10 HTN (HYPERTENSION), BENIGN: Primary | ICD-10-CM

## 2025-02-05 DIAGNOSIS — E11.22 CONTROLLED TYPE 2 DIABETES MELLITUS WITH STAGE 3 CHRONIC KIDNEY DISEASE, WITHOUT LONG-TERM CURRENT USE OF INSULIN (HCC): ICD-10-CM

## 2025-02-05 PROBLEM — R73.01 IMPAIRED FASTING BLOOD SUGAR: Status: RESOLVED | Noted: 2017-09-17 | Resolved: 2025-02-05

## 2025-02-05 PROCEDURE — 1159F MED LIST DOCD IN RCRD: CPT | Performed by: FAMILY MEDICINE

## 2025-02-05 PROCEDURE — 1090F PRES/ABSN URINE INCON ASSESS: CPT | Performed by: FAMILY MEDICINE

## 2025-02-05 PROCEDURE — 1036F TOBACCO NON-USER: CPT | Performed by: FAMILY MEDICINE

## 2025-02-05 PROCEDURE — G8399 PT W/DXA RESULTS DOCUMENT: HCPCS | Performed by: FAMILY MEDICINE

## 2025-02-05 PROCEDURE — 3075F SYST BP GE 130 - 139MM HG: CPT | Performed by: FAMILY MEDICINE

## 2025-02-05 PROCEDURE — G8427 DOCREV CUR MEDS BY ELIG CLIN: HCPCS | Performed by: FAMILY MEDICINE

## 2025-02-05 PROCEDURE — 99214 OFFICE O/P EST MOD 30 MIN: CPT | Performed by: FAMILY MEDICINE

## 2025-02-05 PROCEDURE — 3078F DIAST BP <80 MM HG: CPT | Performed by: FAMILY MEDICINE

## 2025-02-05 PROCEDURE — G8420 CALC BMI NORM PARAMETERS: HCPCS | Performed by: FAMILY MEDICINE

## 2025-02-05 PROCEDURE — 1123F ACP DISCUSS/DSCN MKR DOCD: CPT | Performed by: FAMILY MEDICINE

## 2025-02-05 PROCEDURE — 1160F RVW MEDS BY RX/DR IN RCRD: CPT | Performed by: FAMILY MEDICINE

## 2025-02-05 RX ORDER — CARVEDILOL 25 MG/1
25 TABLET ORAL 2 TIMES DAILY
Qty: 180 TABLET | Refills: 3 | Status: SHIPPED | OUTPATIENT
Start: 2025-02-05 | End: 2026-02-05

## 2025-02-05 RX ORDER — ROSUVASTATIN CALCIUM 5 MG/1
5 TABLET, COATED ORAL DAILY
Qty: 90 TABLET | Refills: 3 | Status: SHIPPED | OUTPATIENT
Start: 2025-02-05 | End: 2026-02-05

## 2025-02-05 RX ORDER — RALOXIFENE HYDROCHLORIDE 60 MG/1
60 TABLET, FILM COATED ORAL DAILY
Qty: 90 TABLET | Refills: 3 | Status: SHIPPED | OUTPATIENT
Start: 2025-02-05 | End: 2026-02-05

## 2025-02-05 RX ORDER — LEVOTHYROXINE SODIUM 75 UG/1
75 TABLET ORAL DAILY
Qty: 90 TABLET | Refills: 3 | Status: SHIPPED | OUTPATIENT
Start: 2025-02-05 | End: 2026-02-05

## 2025-02-05 RX ORDER — BENAZEPRIL HYDROCHLORIDE 40 MG/1
40 TABLET ORAL DAILY
Qty: 90 TABLET | Refills: 3 | Status: SHIPPED | OUTPATIENT
Start: 2025-02-05 | End: 2026-02-05

## 2025-02-05 RX ORDER — ALLOPURINOL 100 MG/1
150 TABLET ORAL DAILY
Qty: 135 TABLET | Refills: 3 | Status: SHIPPED | OUTPATIENT
Start: 2025-02-05 | End: 2026-02-05

## 2025-02-05 RX ORDER — SPIRONOLACTONE 25 MG/1
25 TABLET ORAL DAILY
Qty: 90 TABLET | Refills: 3 | Status: SHIPPED | OUTPATIENT
Start: 2025-02-05 | End: 2026-02-05

## 2025-02-05 RX ORDER — FUROSEMIDE 20 MG/1
20 TABLET ORAL DAILY PRN
Qty: 90 TABLET | Refills: 3 | Status: SHIPPED | OUTPATIENT
Start: 2025-02-05 | End: 2026-02-05

## 2025-02-05 RX ORDER — CLONIDINE HYDROCHLORIDE 0.1 MG/1
0.1 TABLET ORAL 2 TIMES DAILY PRN
Qty: 60 TABLET | Refills: 3 | Status: SHIPPED | OUTPATIENT
Start: 2025-02-05

## 2025-02-05 NOTE — PROGRESS NOTES
Anthony Babcock DO  Diplomate of the American Board of Family Medicine  [facility]    Amanda Morris (: 1940) is a 84 y.o. female, here for evaluation of the following chief complaint(s):  Hypertension     Assessment & Plan    1. HTN (hypertension), benign  -     cloNIDine (CATAPRES) 0.1 MG tablet; Take 1 tablet by mouth 2 times daily as needed for High Blood Pressure (SBP >160), Disp-60 tablet, R-3Normal  -     benazepril (LOTENSIN) 40 MG tablet; Take 1 tablet by mouth daily, Disp-90 tablet, R-3Normal  -     carvedilol (COREG) 25 MG tablet; Take 1 tablet by mouth 2 times daily, Disp-180 tablet, R-3Normal  -     spironolactone (ALDACTONE) 25 MG tablet; Take 1 tablet by mouth daily, Disp-90 tablet, R-3Normal  2. Hypothyroidism, adult  -     levothyroxine (SYNTHROID) 75 MCG tablet; Take 1 tablet by mouth Daily, Disp-90 tablet, R-3Normal  3. Osteopenia of multiple sites  -     raloxifene (EVISTA) 60 MG tablet; Take 1 tablet by mouth daily, Disp-90 tablet, R-3Normal  4. Mixed hyperlipidemia  -     rosuvastatin (CRESTOR) 5 MG tablet; Take 1 tablet by mouth daily, Disp-90 tablet, R-3Normal  5. Controlled type 2 diabetes mellitus with stage 3 chronic kidney disease, without long-term current use of insulin (HCC)  6. Chronic idiopathic gout involving toe without tophus, unspecified laterality  -     allopurinol (ZYLOPRIM) 100 MG tablet; Take 1.5 tablets by mouth daily, Disp-135 tablet, R-3Normal    -Tolerating medication well for HTN. Achieving desired therapeutic response with   Hypertension Medications       ACE Inhibitors       benazepril (LOTENSIN) 40 MG tablet Take 1 tablet by mouth daily       Potassium Sparing Diuretics       spironolactone (ALDACTONE) 25 MG tablet Take 1 tablet by mouth daily       Antiadrenergic Antihypertensives       cloNIDine (CATAPRES) 0.1 MG tablet Take 1 tablet by mouth 2 times daily as needed for High Blood Pressure (SBP >160)        Alpha-Beta

## 2025-03-05 ENCOUNTER — CARE COORDINATION (OUTPATIENT)
Dept: CARE COORDINATION | Facility: CLINIC | Age: 85
End: 2025-03-05

## 2025-03-05 NOTE — CARE COORDINATION
Ambulatory Care Coordination Note     3/5/2025 11:26 AM     Care Summary Note:  Outreach for High Risk Case Management  Unable to reach  Left VM requesting return call.     ACM: Radha Shah RN     Method of communication with provider: none.    PCP/Specialist follow up:   Future Appointments         Provider Specialty Dept Phone    5/7/2025 9:00 AM Anthony Babcock DO Internal Medicine [phone]    8/11/2025 11:00 AM Bert Fernandez MD Cardiology [phone]

## 2025-05-07 ENCOUNTER — OFFICE VISIT (OUTPATIENT)
Dept: INTERNAL MEDICINE CLINIC | Facility: CLINIC | Age: 85
End: 2025-05-07
Payer: MEDICARE

## 2025-05-07 VITALS
OXYGEN SATURATION: 97 % | DIASTOLIC BLOOD PRESSURE: 68 MMHG | SYSTOLIC BLOOD PRESSURE: 144 MMHG | HEIGHT: 65 IN | BODY MASS INDEX: 24.99 KG/M2 | WEIGHT: 150 LBS | HEART RATE: 66 BPM

## 2025-05-07 DIAGNOSIS — E11.22 CONTROLLED TYPE 2 DIABETES MELLITUS WITH STAGE 3 CHRONIC KIDNEY DISEASE, WITHOUT LONG-TERM CURRENT USE OF INSULIN (HCC): ICD-10-CM

## 2025-05-07 DIAGNOSIS — N18.30 CONTROLLED TYPE 2 DIABETES MELLITUS WITH STAGE 3 CHRONIC KIDNEY DISEASE, WITHOUT LONG-TERM CURRENT USE OF INSULIN (HCC): ICD-10-CM

## 2025-05-07 DIAGNOSIS — I10 WHITE COAT SYNDROME WITH HYPERTENSION: Primary | ICD-10-CM

## 2025-05-07 PROCEDURE — 1090F PRES/ABSN URINE INCON ASSESS: CPT | Performed by: FAMILY MEDICINE

## 2025-05-07 PROCEDURE — G8420 CALC BMI NORM PARAMETERS: HCPCS | Performed by: FAMILY MEDICINE

## 2025-05-07 PROCEDURE — 1036F TOBACCO NON-USER: CPT | Performed by: FAMILY MEDICINE

## 2025-05-07 PROCEDURE — 3078F DIAST BP <80 MM HG: CPT | Performed by: FAMILY MEDICINE

## 2025-05-07 PROCEDURE — 1123F ACP DISCUSS/DSCN MKR DOCD: CPT | Performed by: FAMILY MEDICINE

## 2025-05-07 PROCEDURE — 99214 OFFICE O/P EST MOD 30 MIN: CPT | Performed by: FAMILY MEDICINE

## 2025-05-07 PROCEDURE — G8399 PT W/DXA RESULTS DOCUMENT: HCPCS | Performed by: FAMILY MEDICINE

## 2025-05-07 PROCEDURE — G8427 DOCREV CUR MEDS BY ELIG CLIN: HCPCS | Performed by: FAMILY MEDICINE

## 2025-05-07 PROCEDURE — 1159F MED LIST DOCD IN RCRD: CPT | Performed by: FAMILY MEDICINE

## 2025-05-07 PROCEDURE — 3077F SYST BP >= 140 MM HG: CPT | Performed by: FAMILY MEDICINE

## 2025-05-07 NOTE — PROGRESS NOTES
Anthony Babcock DO  Diplomate of the American Board of Family Medicine  [facility]    Amanda Morris (: 1940) is a 84 y.o. female, here for evaluation of the following chief complaint(s):  Hypertension     Assessment & Plan  - Hypertension.    - Home readings within normal range, last 130/70.    - Office reading 144/68, likely stress-related.    - will continue   Hypertension Medications       ACE Inhibitors       benazepril (LOTENSIN) 40 MG tablet Take 1 tablet by mouth daily       Potassium Sparing Diuretics       spironolactone (ALDACTONE) 25 MG tablet Take 1 tablet by mouth daily       Antiadrenergic Antihypertensives       cloNIDine (CATAPRES) 0.1 MG tablet Take 1 tablet by mouth 2 times daily as needed for High Blood Pressure (SBP >160)      Alpha-Beta Blockers       carvedilol (COREG) 25 MG tablet Take 1 tablet by mouth 2 times daily           DM2  -Encouraged periodic checking of blood sugars, A1c next visit      1. White coat syndrome with hypertension  2. Controlled type 2 diabetes mellitus with stage 3 chronic kidney disease, without long-term current use of insulin (HCC)    History of Present Illness  The patient presents for evaluation of hypertension and cataracts.    Hypertension  - She has been monitoring her blood pressure at home, with the last recorded reading of 130/70.  - Today's reading is 144/68.  - Experienced near syncope yesterday with palpitations.    DM   -Does not check BS, diet controlled.  Asymptomatic.    ROS negative except as noted above today.      Social History     Tobacco Use    Smoking status: Former     Current packs/day: 0.00     Types: Cigarettes     Quit date: 1983     Years since quittin.3    Smokeless tobacco: Never   Vaping Use    Vaping status: Never Used   Substance Use Topics    Alcohol use: Yes     Alcohol/week: 0.0 standard drinks of alcohol    Drug use: No     Vitals:    25 0856 25 0932   BP: (!) 150/70

## 2025-05-21 ENCOUNTER — CARE COORDINATION (OUTPATIENT)
Dept: CARE COORDINATION | Facility: CLINIC | Age: 85
End: 2025-05-21

## 2025-05-21 NOTE — CARE COORDINATION
Ambulatory Care Coordination Note     5/21/2025 11:33 AM    Care Summary Note:   Outreach for High Risk Case Management  Unable to reach  Left message on identified VM  Per chart review, patient is in touch via MyChart with PCP re: blood pressure.  Cataract sx scheduled for tomorrow.     ACM: Radha Shah RN      PCP/Specialist follow up:   Future Appointments         Provider Specialty Dept Phone    8/4/2025 8:30 AM Anthony Babcock,  Internal Medicine [phone]    8/11/2025 11:00 AM Bert Fernandez MD Cardiology [phone]

## 2025-06-25 ENCOUNTER — CARE COORDINATION (OUTPATIENT)
Dept: CARE COORDINATION | Facility: CLINIC | Age: 85
End: 2025-06-25

## 2025-06-25 NOTE — CARE COORDINATION
Ambulatory Care Coordination Note     6/25/2025 9:43 AM     Patient graduated at this time  All goals have been met  Patient has ACM's contact information should there be questions/concerns

## 2025-08-04 ENCOUNTER — OFFICE VISIT (OUTPATIENT)
Dept: INTERNAL MEDICINE CLINIC | Facility: CLINIC | Age: 85
End: 2025-08-04

## 2025-08-04 VITALS
OXYGEN SATURATION: 98 % | WEIGHT: 150 LBS | SYSTOLIC BLOOD PRESSURE: 132 MMHG | BODY MASS INDEX: 24.99 KG/M2 | HEIGHT: 65 IN | DIASTOLIC BLOOD PRESSURE: 70 MMHG | HEART RATE: 62 BPM

## 2025-08-04 DIAGNOSIS — E11.22 CONTROLLED TYPE 2 DIABETES MELLITUS WITH STAGE 3 CHRONIC KIDNEY DISEASE, WITHOUT LONG-TERM CURRENT USE OF INSULIN (HCC): Primary | ICD-10-CM

## 2025-08-04 DIAGNOSIS — M1A.0790 CHRONIC IDIOPATHIC GOUT INVOLVING TOE WITHOUT TOPHUS, UNSPECIFIED LATERALITY: ICD-10-CM

## 2025-08-04 DIAGNOSIS — N18.31 CHRONIC KIDNEY DISEASE, STAGE 3A (HCC): ICD-10-CM

## 2025-08-04 DIAGNOSIS — E03.9 HYPOTHYROIDISM, ADULT: ICD-10-CM

## 2025-08-04 DIAGNOSIS — N18.30 CONTROLLED TYPE 2 DIABETES MELLITUS WITH STAGE 3 CHRONIC KIDNEY DISEASE, WITHOUT LONG-TERM CURRENT USE OF INSULIN (HCC): Primary | ICD-10-CM

## 2025-08-04 DIAGNOSIS — E78.2 MIXED HYPERLIPIDEMIA: ICD-10-CM

## 2025-08-04 DIAGNOSIS — I10 WHITE COAT SYNDROME WITH HYPERTENSION: ICD-10-CM

## 2025-08-04 LAB
ALBUMIN SERPL-MCNC: 3.8 G/DL (ref 3.2–4.6)
ALBUMIN/GLOB SERPL: 1.2 (ref 1–1.9)
ALP SERPL-CCNC: 48 U/L (ref 35–104)
ALT SERPL-CCNC: 14 U/L (ref 8–45)
ANION GAP SERPL CALC-SCNC: 12 MMOL/L (ref 7–16)
AST SERPL-CCNC: 18 U/L (ref 15–37)
BILIRUB SERPL-MCNC: 0.3 MG/DL (ref 0–1.2)
BUN SERPL-MCNC: 30 MG/DL (ref 8–23)
CALCIUM SERPL-MCNC: 9.7 MG/DL (ref 8.8–10.2)
CHLORIDE SERPL-SCNC: 108 MMOL/L (ref 98–107)
CHOLEST SERPL-MCNC: 91 MG/DL (ref 0–200)
CO2 SERPL-SCNC: 23 MMOL/L (ref 20–29)
CREAT SERPL-MCNC: 1.14 MG/DL (ref 0.6–1.1)
CREAT UR-MCNC: 99.7 MG/DL (ref 28–217)
ERYTHROCYTE [DISTWIDTH] IN BLOOD BY AUTOMATED COUNT: 13.3 % (ref 11.9–14.6)
EST. AVERAGE GLUCOSE BLD GHB EST-MCNC: 155 MG/DL
GLOBULIN SER CALC-MCNC: 3.2 G/DL (ref 2.3–3.5)
GLUCOSE SERPL-MCNC: 156 MG/DL (ref 70–99)
HBA1C MFR BLD: 7 % (ref 0–5.6)
HCT VFR BLD AUTO: 36.9 % (ref 35.8–46.3)
HDLC SERPL-MCNC: 31 MG/DL (ref 40–60)
HDLC SERPL: 2.9 (ref 0–5)
HGB BLD-MCNC: 11.8 G/DL (ref 11.7–15.4)
LDLC SERPL CALC-MCNC: 41 MG/DL (ref 0–100)
MCH RBC QN AUTO: 29.7 PG (ref 26.1–32.9)
MCHC RBC AUTO-ENTMCNC: 32 G/DL (ref 31.4–35)
MCV RBC AUTO: 92.9 FL (ref 82–102)
MICROALBUMIN UR-MCNC: 1.47 MG/DL (ref 0–20)
MICROALBUMIN/CREAT UR-RTO: 15 MG/G (ref 0–30)
NRBC # BLD: 0 K/UL (ref 0–0.2)
PLATELET # BLD AUTO: 250 K/UL (ref 150–450)
PMV BLD AUTO: 11.2 FL (ref 9.4–12.3)
POTASSIUM SERPL-SCNC: 5 MMOL/L (ref 3.5–5.1)
PROT SERPL-MCNC: 7 G/DL (ref 6.3–8.2)
RBC # BLD AUTO: 3.97 M/UL (ref 4.05–5.2)
SODIUM SERPL-SCNC: 142 MMOL/L (ref 136–145)
TRIGL SERPL-MCNC: 90 MG/DL (ref 0–150)
TSH W FREE THYROID IF ABNORMAL: 1.52 UIU/ML (ref 0.27–4.2)
URATE SERPL-MCNC: 4.9 MG/DL (ref 2.5–7.1)
VLDLC SERPL CALC-MCNC: 18 MG/DL (ref 6–23)
WBC # BLD AUTO: 5.9 K/UL (ref 4.3–11.1)

## 2025-08-07 DIAGNOSIS — N18.31 CHRONIC KIDNEY DISEASE, STAGE 3A (HCC): Primary | ICD-10-CM

## 2025-08-11 ENCOUNTER — OFFICE VISIT (OUTPATIENT)
Age: 85
End: 2025-08-11
Payer: MEDICARE

## 2025-08-11 VITALS
DIASTOLIC BLOOD PRESSURE: 62 MMHG | WEIGHT: 152 LBS | HEART RATE: 69 BPM | HEIGHT: 65 IN | BODY MASS INDEX: 25.33 KG/M2 | SYSTOLIC BLOOD PRESSURE: 152 MMHG

## 2025-08-11 DIAGNOSIS — R00.2 PALPITATIONS: Primary | ICD-10-CM

## 2025-08-11 DIAGNOSIS — E78.2 MIXED HYPERLIPIDEMIA: ICD-10-CM

## 2025-08-11 PROCEDURE — 3077F SYST BP >= 140 MM HG: CPT | Performed by: INTERNAL MEDICINE

## 2025-08-11 PROCEDURE — 1090F PRES/ABSN URINE INCON ASSESS: CPT | Performed by: INTERNAL MEDICINE

## 2025-08-11 PROCEDURE — G8419 CALC BMI OUT NRM PARAM NOF/U: HCPCS | Performed by: INTERNAL MEDICINE

## 2025-08-11 PROCEDURE — 1159F MED LIST DOCD IN RCRD: CPT | Performed by: INTERNAL MEDICINE

## 2025-08-11 PROCEDURE — 93000 ELECTROCARDIOGRAM COMPLETE: CPT | Performed by: INTERNAL MEDICINE

## 2025-08-11 PROCEDURE — 99213 OFFICE O/P EST LOW 20 MIN: CPT | Performed by: INTERNAL MEDICINE

## 2025-08-11 PROCEDURE — G8427 DOCREV CUR MEDS BY ELIG CLIN: HCPCS | Performed by: INTERNAL MEDICINE

## 2025-08-11 PROCEDURE — 1126F AMNT PAIN NOTED NONE PRSNT: CPT | Performed by: INTERNAL MEDICINE

## 2025-08-11 PROCEDURE — 1036F TOBACCO NON-USER: CPT | Performed by: INTERNAL MEDICINE

## 2025-08-11 PROCEDURE — 1160F RVW MEDS BY RX/DR IN RCRD: CPT | Performed by: INTERNAL MEDICINE

## 2025-08-11 PROCEDURE — 3078F DIAST BP <80 MM HG: CPT | Performed by: INTERNAL MEDICINE

## 2025-08-11 PROCEDURE — G8399 PT W/DXA RESULTS DOCUMENT: HCPCS | Performed by: INTERNAL MEDICINE

## 2025-08-11 PROCEDURE — 1123F ACP DISCUSS/DSCN MKR DOCD: CPT | Performed by: INTERNAL MEDICINE

## 2025-08-11 ASSESSMENT — ENCOUNTER SYMPTOMS
EYES NEGATIVE: 1
PHOTOPHOBIA: 0
ALLERGIC/IMMUNOLOGIC NEGATIVE: 1
GASTROINTESTINAL NEGATIVE: 1
EYE PAIN: 0
ABDOMINAL PAIN: 0
SHORTNESS OF BREATH: 0
CHEST TIGHTNESS: 0
BACK PAIN: 0
RESPIRATORY NEGATIVE: 1

## 2025-08-17 DIAGNOSIS — I10 HTN (HYPERTENSION), BENIGN: ICD-10-CM

## 2025-08-19 RX ORDER — CARVEDILOL 25 MG/1
25 TABLET ORAL 2 TIMES DAILY
Qty: 180 TABLET | Refills: 3 | Status: SHIPPED | OUTPATIENT
Start: 2025-08-19 | End: 2026-08-19

## 2025-08-28 ENCOUNTER — OFFICE VISIT (OUTPATIENT)
Dept: INTERNAL MEDICINE CLINIC | Facility: CLINIC | Age: 85
End: 2025-08-28

## 2025-08-28 ENCOUNTER — PATIENT MESSAGE (OUTPATIENT)
Dept: INTERNAL MEDICINE CLINIC | Facility: CLINIC | Age: 85
End: 2025-08-28

## 2025-08-28 VITALS
HEIGHT: 65 IN | HEART RATE: 80 BPM | DIASTOLIC BLOOD PRESSURE: 70 MMHG | SYSTOLIC BLOOD PRESSURE: 150 MMHG | OXYGEN SATURATION: 97 % | WEIGHT: 150 LBS | TEMPERATURE: 97.9 F | BODY MASS INDEX: 24.99 KG/M2

## 2025-08-28 DIAGNOSIS — H60.392 OTHER INFECTIVE ACUTE OTITIS EXTERNA OF LEFT EAR: Primary | ICD-10-CM

## 2025-08-28 DIAGNOSIS — R09.81 NASAL CONGESTION: ICD-10-CM

## 2025-08-28 DIAGNOSIS — R42 DIZZINESS: ICD-10-CM

## 2025-08-28 DIAGNOSIS — H61.22 IMPACTED CERUMEN OF LEFT EAR: ICD-10-CM

## 2025-08-28 LAB
EXP DATE SOLUTION: NORMAL
EXP DATE SWAB: NORMAL
EXPIRATION DATE: NORMAL
LOT NUMBER POC: NORMAL
LOT NUMBER SOLUTION: NORMAL
LOT NUMBER SWAB: NORMAL
SARS-COV-2 RNA, POC: NEGATIVE

## 2025-08-28 RX ORDER — NEOMYCIN SULFATE, POLYMYXIN B SULFATE AND HYDROCORTISONE 3.5; 10000; 1 MG/ML; [IU]/ML; MG/ML
4 SOLUTION AURICULAR (OTIC) 3 TIMES DAILY
Qty: 10 ML | Refills: 0 | Status: SHIPPED | OUTPATIENT
Start: 2025-08-28 | End: 2025-09-07

## 2025-08-28 RX ORDER — CIPROFLOXACIN AND DEXAMETHASONE 3; 1 MG/ML; MG/ML
4 SUSPENSION/ DROPS AURICULAR (OTIC) 2 TIMES DAILY
Qty: 7.5 ML | Refills: 0 | Status: SHIPPED | OUTPATIENT
Start: 2025-08-28 | End: 2025-08-28

## 2025-08-28 RX ORDER — CIPROFLOXACIN AND DEXAMETHASONE 3; 1 MG/ML; MG/ML
4 SUSPENSION/ DROPS AURICULAR (OTIC) 2 TIMES DAILY
Qty: 7.5 ML | Refills: 0 | Status: SHIPPED | OUTPATIENT
Start: 2025-08-28 | End: 2025-09-04

## 2025-08-28 RX ORDER — CEFDINIR 300 MG/1
300 CAPSULE ORAL 2 TIMES DAILY
Qty: 14 CAPSULE | Refills: 0 | Status: SHIPPED | OUTPATIENT
Start: 2025-08-28 | End: 2025-09-04

## 2025-08-28 ASSESSMENT — ENCOUNTER SYMPTOMS
VOMITING: 0
SORE THROAT: 0
VISUAL CHANGE: 0
COUGH: 0
NAUSEA: 1

## 2025-08-31 DIAGNOSIS — M1A.0790 CHRONIC IDIOPATHIC GOUT INVOLVING TOE WITHOUT TOPHUS, UNSPECIFIED LATERALITY: ICD-10-CM

## 2025-09-02 RX ORDER — ALLOPURINOL 100 MG/1
150 TABLET ORAL DAILY
Qty: 135 TABLET | Refills: 3 | Status: SHIPPED | OUTPATIENT
Start: 2025-09-02 | End: 2026-09-02

## (undated) DEVICE — SYR 50ML LR LCK 1ML GRAD NSAF --

## (undated) DEVICE — SHEARS ENDOSCP L36CM DIA5MM ULTRASONIC CRV TIP W/ ADV

## (undated) DEVICE — KENDALL RADIOLUCENT FOAM MONITORING ELECTRODE RECTANGULAR SHAPE: Brand: KENDALL

## (undated) DEVICE — BLOCK BITE AD 60FR W/ VELC STRP ADDRESSES MOST PT AND

## (undated) DEVICE — REM POLYHESIVE ADULT PATIENT RETURN ELECTRODE: Brand: VALLEYLAB

## (undated) DEVICE — (D)SYR 10ML 1/5ML GRAD NSAF -- PKGING CHANGE USE ITEM 338027

## (undated) DEVICE — UNIVERSAL FIXATION CANNULA: Brand: VERSAONE

## (undated) DEVICE — CANNULA NSL ORAL AD FOR CAPNOFLEX CO2 O2 AIRLFE

## (undated) DEVICE — ADULT SPO2 SENSOR: Brand: NELLCOR

## (undated) DEVICE — SUTURE MCRYL SZ 4-0 L27IN ABSRB UD L19MM PS-2 1/2 CIR PRIM Y426H

## (undated) DEVICE — AMD ANTIMICROBIAL GAUZE SPONGES,12 PLY USP TYPE VII, 0.2% POLYHEXAMETHYLENE BIGUANIDE HCI (PHMB): Brand: CURITY

## (undated) DEVICE — LAP CHOLE: Brand: MEDLINE INDUSTRIES, INC.

## (undated) DEVICE — SUTURE SZ 0 27IN 5/8 CIR UR-6  TAPER PT VIOLET ABSRB VICRYL J603H

## (undated) DEVICE — SLIM BODY SKIN STAPLER: Brand: APPOSE ULC

## (undated) DEVICE — SYR 3ML LL TIP 1/10ML GRAD --

## (undated) DEVICE — NDL PRT INJ NSAF BLNT 18GX1.5 --

## (undated) DEVICE — E-Z CLEAN, PTFE COATED, ELECTROSURGICAL LAPAROSCOPIC ELECTRODE, SPATULA, 33 CM., SINGLE-USE, FOR USE WITH HAND CONTROL PENCIL: Brand: MEGADYNE

## (undated) DEVICE — TISSUE RETRIEVAL SYSTEM: Brand: INZII RETRIEVAL SYSTEM

## (undated) DEVICE — SPHINCTEROTOME: Brand: JAGTOME RX 44

## (undated) DEVICE — CONTAINER SPEC FRMLN 120ML --

## (undated) DEVICE — KENDALL SCD EXPRESS SLEEVES, KNEE LENGTH, MEDIUM: Brand: KENDALL SCD

## (undated) DEVICE — SYR 5ML 1/5 GRAD LL NSAF LF --

## (undated) DEVICE — BLUNT TROCAR WITH THREADED ANCHOR: Brand: VERSAONE

## (undated) DEVICE — 2, DISPOSABLE SUCTION/IRRIGATOR WITHOUT DISPOSABLE TIP: Brand: STRYKEFLOW

## (undated) DEVICE — APPLIER CLP M/L SHFT DIA5MM 15 LIG LIGAMAX 5

## (undated) DEVICE — DEVICE LCK BILI RAP EXCHG OLPS --

## (undated) DEVICE — INTENDED FOR TISSUE SEPARATION, AND OTHER PROCEDURES THAT REQUIRE A SHARP SURGICAL BLADE TO PUNCTURE OR CUT.: Brand: BARD-PARKER SAFETY BLADES SIZE 15, STERILE

## (undated) DEVICE — SUTURE ENDOLOOP SZ 0 L18IN ABSRB VLT LIG SLDE KNOT VCRL

## (undated) DEVICE — RETRIEVAL BALLOON CATHETER: Brand: EXTRACTOR™ PRO RX

## (undated) DEVICE — SOL ANTI-FOG 6ML MEDC -- MEDICHOICE - CONVERT TO 358427

## (undated) DEVICE — DERMABOND SKIN ADH 0.7ML -- DERMABOND ADVANCED 12/BX

## (undated) DEVICE — 2000CC GUARDIAN II: Brand: GUARDIAN

## (undated) DEVICE — (D)PREP SKN CHLRAPRP APPL 26ML -- CONVERT TO ITEM 371833

## (undated) DEVICE — BLADELESS OPTICAL TROCAR WITH FIXATION CANNULA: Brand: VERSAPORT

## (undated) DEVICE — STANDARD HYPODERMIC NEEDLE,POLYPROPYLENE HUB: Brand: MONOJECT

## (undated) DEVICE — [HIGH FLOW INSUFFLATOR,  DO NOT USE IF PACKAGE IS DAMAGED,  KEEP DRY,  KEEP AWAY FROM SUNLIGHT,  PROTECT FROM HEAT AND RADIOACTIVE SOURCES.]: Brand: PNEUMOSURE